# Patient Record
Sex: FEMALE | Race: WHITE | NOT HISPANIC OR LATINO | Employment: OTHER | URBAN - METROPOLITAN AREA
[De-identification: names, ages, dates, MRNs, and addresses within clinical notes are randomized per-mention and may not be internally consistent; named-entity substitution may affect disease eponyms.]

---

## 2017-01-10 ENCOUNTER — TELEPHONE (OUTPATIENT)
Dept: CARDIOLOGY | Facility: HOSPITAL | Age: 77
End: 2017-01-10

## 2017-01-10 NOTE — TELEPHONE ENCOUNTER
----- Message from Darryl Swenson MD sent at 1/10/2017  5:08 PM EST -----  Regarding: RE: 3 second pauses  No. But I told our nurses to lower her flecainide 100 mg po bid    GT  ----- Message -----     From: PATSY Hernandez     Sent: 1/9/2017   2:31 PM       To: Darryl Swenson MD  Subject: 3 second pauses                                  I saw this patient recently in the office.  She was complaining of skipped beats (please see my note).  I put a 14 day Zio monitor on her which has not yet been officially read, but does show a minimum heart rate of 37 bpm and a maximum of 156/m.  She had 3 pauses; the longest lasted 3.3 seconds.  She had first-degree AV block; 19 supraventricular tachycardia runs; atrial fibrillation occurred at 9% burden ranging from  bpm.  She also had possible junctional rhythm.    She has an appointment to see on 1/24/17.  Does that appointment need to be sooner?

## 2017-01-11 ENCOUNTER — TELEPHONE (OUTPATIENT)
Dept: CARDIOLOGY | Facility: CLINIC | Age: 77
End: 2017-01-11

## 2017-01-11 NOTE — TELEPHONE ENCOUNTER
I called and spoke with the patient. I let her know that Dr. Swenson wanted her to decrease Flecainide to 100 mg BID. She doesn't need a prescription. I told her to let us know if she starts feeling bad.

## 2017-01-13 ENCOUNTER — OFFICE VISIT (OUTPATIENT)
Dept: CARDIOLOGY | Facility: HOSPITAL | Age: 77
End: 2017-01-13

## 2017-01-13 ENCOUNTER — PROCEDURE VISIT (OUTPATIENT)
Dept: CARDIOLOGY | Facility: HOSPITAL | Age: 77
End: 2017-01-13

## 2017-01-13 VITALS
BODY MASS INDEX: 30.9 KG/M2 | OXYGEN SATURATION: 98 % | DIASTOLIC BLOOD PRESSURE: 72 MMHG | TEMPERATURE: 98.6 F | HEIGHT: 64 IN | HEART RATE: 66 BPM | WEIGHT: 181 LBS | RESPIRATION RATE: 20 BRPM | SYSTOLIC BLOOD PRESSURE: 177 MMHG

## 2017-01-13 DIAGNOSIS — I48.0 PAROXYSMAL ATRIAL FIBRILLATION (HCC): Primary | ICD-10-CM

## 2017-01-13 DIAGNOSIS — I48.0 PAROXYSMAL ATRIAL FIBRILLATION (HCC): ICD-10-CM

## 2017-01-13 PROCEDURE — 93005 ELECTROCARDIOGRAM TRACING: CPT

## 2017-01-13 PROCEDURE — 99214 OFFICE O/P EST MOD 30 MIN: CPT | Performed by: NURSE PRACTITIONER

## 2017-01-13 PROCEDURE — 93010 ELECTROCARDIOGRAM REPORT: CPT | Performed by: INTERNAL MEDICINE

## 2017-01-13 RX ORDER — FLECAINIDE ACETATE 100 MG/1
100 TABLET ORAL 2 TIMES DAILY
COMMUNITY
End: 2017-01-24 | Stop reason: SDUPTHER

## 2017-01-13 NOTE — MR AVS SNAPSHOT
Kenisha ACOSTA Peg   1/13/2017 10:30 AM   Office Visit    Dept Phone:  [phone]   Encounter #:  [id number]    Provider:  PATSY Hernandez   Department:  [facility]                Your Full Care Plan              Today's Medication Changes          These changes are accurate as of: 1/13/17  1:46 PM.  If you have any questions, ask your nurse or doctor.               Medication(s)that have changed:     apixaban 5 MG tablet tablet   Commonly known as:  ELIQUIS   Take 5 mg by mouth 2 (two) times a day.   What changed:  Another medication with the same name was removed. Continue taking this medication, and follow the directions you see here.       flecainide 100 MG tablet   Commonly known as:  TAMBOCOR   Take 100 mg by mouth 2 (Two) Times a Day.   What changed:  Another medication with the same name was removed. Continue taking this medication, and follow the directions you see here.   Changed by:  PATSY Hernandez                  Your Updated Medication List          This list is accurate as of: 1/13/17  1:46 PM.  Always use your most recent med list.                amLODIPine 2.5 MG tablet   Commonly known as:  NORVASC   Take 1 tablet by mouth Daily.       apixaban 5 MG tablet tablet   Commonly known as:  ELIQUIS       aspirin 81 MG tablet       cholecalciferol 1000 UNITS tablet   Commonly known as:  VITAMIN D3       coenzyme Q10 100 MG capsule       flecainide 100 MG tablet   Commonly known as:  TAMBOCOR       isosorbide mononitrate 30 MG 24 hr tablet   Commonly known as:  IMDUR   Take 1 tablet by mouth daily.       levothyroxine 25 MCG tablet   Commonly known as:  SYNTHROID, LEVOTHROID       meclizine 25 MG tablet   Commonly known as:  ANTIVERT       nitroglycerin 0.4 MG SL tablet   Commonly known as:  NITROSTAT   1 under the tongue as needed for angina, may repeat q5mins for up three doses       PARoxetine 10 MG tablet   Commonly known as:   PAXIL       rosuvastatin 10 MG tablet   Commonly known as:  CRESTOR       valsartan-hydrochlorothiazide 160-25 MG per tablet   Commonly known as:  DIOVAN-HCT               You Were Diagnosed With        Codes Comments    Paroxysmal atrial fibrillation    -  Primary ICD-10-CM: I48.0  ICD-9-CM: 427.31       Instructions     None    Patient Instructions History      Upcoming Appointments     Visit Type Date Time Department    FOLLOW UP 1/13/2017 10:30 AM [facility] [place]    ELECTROCARDIOGRAM 1/13/2017 11:15 AM [facility]    FOLLOW UP 1/24/2017  8:45 AM [facility]    FOLLOW UP 5/3/2017  2:00 PM [facility]      MyChart Signup     Our records indicate that you have an active [employer] account.    You can view your After Visit Summary by going to [employer] and logging in with your [employer] username and password.  If you don't have a [employer] username and password but a parent or guardian has access to your record, the parent or guardian should login with their own [employer] username and password and access your record to view the After Visit Summary.    If you have questions, you can email [email or web address] or call [phone] to talk to our [employer] staff.  Remember, [employer] is NOT to be used for urgent needs.  For medical emergencies, dial 911.               Other Info from Your Visit           Your Appointments     Jan 24, 2017  8:45 AM EST   Follow Up with Darryl Swenson MD   [facility] CARDIOLOGY (--)    [address] [address]   [phone]           Arrive 15 minutes prior to appointment.            May 03, 2017  2:00 PM EDT   Follow Up with Darryl Swenson MD   [facility] CARDIOLOGY (--)    [address] [place] [id number]   [phone]           Arrive 15 minutes prior to appointment.              Allergies     Beta Adrenergic Blockers  "Intolerance Dizziness    Sulfa Antibiotics Allergy Rash      Reason for Visit     Follow-up a fib      Vital Signs     Blood Pressure Pulse Temperature Respirations Height Weight    177/72 (BP Location: Left arm, Patient Position: Standing) 66 98.6 °F (37 °C) 20 63.5\" (161.3 cm) 181 lb (82.1 kg)    Oxygen Saturation Body Mass Index Smoking Status             98% 31.56 kg/m2 Never Smoker         Problems and Diagnoses Noted     Atrial fibrillation (irregular heartbeat)        "

## 2017-01-13 NOTE — PROGRESS NOTES
"  Encounter Date: 01/13/2017    Patient ID: Kenisha Ruvalcaba is a 76 y.o. female    Chief Complaint: Follow-up (a fib)     HPI :    This is a delightful elderly white female who is normally followed by Sony Swenson for her paroxysmal atrial fibrillation, sick sinus syndrome, and grade 2 diastolic heart failure.  Dr. Clark is her primary general cardiologist. She has a history of hypertension and hyperlipidemia. She has had vertigo for many years. She has had intermittent pain across her neck and shoulders for years as well. She feels a tightness across her chest which occurs at rest and lasts about 45 seconds. It resolved spontaneously. She did have a heart catheterization in June 2016 which showed nonobstructive coronary arteries and a normal EF.      She was seen in October 2016 in the emergency room with tightness across her chest and neck.  She was found to be in A. fib with RVR and was given diltiazem during her visit.  She converted with the medication.  At that time her flecainide was increased from 100 mg twice daily to 150 mg twice daily.  She has been maintained on apixaban twice a day throughout.  I saw her on 12/15/16 where she continued to feel skipping heartbeats on a fairly regular basis.  She was also having fairly substantial lower extremity edema.  I started her on amlodipine 2.5 mg daily, asked her to limit her sodium intake, wear compression stockings, and placed an event monitor on her.    Her preliminary cardiac monitor showed that she had heart rates as low as 37 and she had 3 pauses with the longest lasting 3.3 seconds, some runs of SVT and 9% burden of atrial fibrillation.  I relayed this information to Dr. Swenson who had his nurses call her to decrease her flecainide to 100 mg twice daily.  She comes in to see me for follow-up in this context.    She is accompanied by her daughter who relates that she has had significant episodes of \"sinking spells\" which occur suddenly at varying " times of the day regardless of activity but the patient suddenly becomes pale and shaky.  She has to sit down and rest for 5 or 6 minutes and then can resume her activities.  She has had no true syncope.  She denies chest pain, pressure, PND, orthopnea, hematuria, hematochezia, melena.  EKG in the office today shows sinus rhythm with first-degree AV block; left axis deviation; pulmonary disease pattern.  Ventricular rate is 61 bpm There are no precipitating or alleviating factors.  She relates that overall she has been more fatigued than normal.  She tells me that she felt better when taking 150 mg of flecainide twice daily.        Past Medical History   Diagnosis Date   • Anxiety    • Arrhythmia      atrial fibrillation   • Carotid artery occlusion    • Coronary artery disease    • Dyslipidemia    • H/O echocardiogram 09/10/2010     Left atrium 4.6. Moderate left atrial enlargement. Ejection fraction 55%. Severe right atrial enlargement and 2+ tricuspid regurgitation with RVSP 37mmHg.    • H/O echocardiogram 12/03/2010     Moderate mitral regurgitation, moderate tricuspid regurgitation, normal left ventricular size and function, negative bubble study.    • History of cardioversion 12/03/2010     initiation of Tambocor and subsequent external cardioversion.   • History of Doppler ultrasound 03/01/2013     carotid ultrasound, with no significant stenosis    • Hyperlipidemia    • Hypertension    • Paroxysmal atrial fibrillation    • SSS (sick sinus syndrome)    • Valvular heart disease    • Vertigo          Past Surgical History   Procedure Laterality Date   • Carotid endarterectomy Left    • Tubal abdominal ligation     • Cardiac catheterization Left 6/16/2016     Procedure: Cardiac catheterization;  Surgeon: Chuck Clark MD;  Location: [facility] INVASIVE LOCATION;  Service:        Social History     Social History   • Marital status:      Spouse name: N/A   • Number of children: N/A   • Years  of education: N/A     Occupational History   • retired      Social History Main Topics   • Smoking status: Never Smoker   • Smokeless tobacco: Never Used   • Alcohol use 0.6 oz/week     1 Glasses of wine per week      Comment: ocassionally   • Drug use: No   • Sexual activity: Defer     Other Topics Concern   • Not on file     Social History Narrative    Denies caffeine use. Lives at home with .        Family History   Problem Relation Age of Onset   • Alzheimer's disease Mother    • Cancer Mother    • Aortic aneurysm Father    • Heart valve disorder Sister        Review of Systems:  Review of Systems   Constitution: Positive for weakness and malaise/fatigue. Negative for chills, decreased appetite, diaphoresis, fever, night sweats, weight gain and weight loss.   HENT: Negative for congestion and nosebleeds.    Eyes: Positive for blurred vision. Negative for double vision.   Cardiovascular: Positive for chest pain, claudication, dyspnea on exertion, irregular heartbeat and palpitations. Negative for cyanosis, leg swelling, near-syncope, orthopnea, paroxysmal nocturnal dyspnea and syncope.        Chest pressure   Respiratory: Positive for shortness of breath and snoring. Negative for cough, hemoptysis, sleep disturbances due to breathing and wheezing.    Endocrine: Negative.    Hematologic/Lymphatic: Negative for adenopathy and bleeding problem. Does not bruise/bleed easily.   Skin: Negative.  Negative for rash.   Musculoskeletal: Positive for arthritis, muscle cramps and muscle weakness. Negative for falls and myalgias.   Gastrointestinal: Negative for bloating, anorexia, melena, nausea and vomiting.   Genitourinary: Negative for dysuria, hematuria and nocturia.   Neurological: Positive for dizziness and loss of balance. Negative for excessive daytime sleepiness, focal weakness, light-headedness and seizures.   Psychiatric/Behavioral: The patient has insomnia.        Objective:  Physical Exam    Constitutional: She is oriented to person, place, and time. She appears well-developed and well-nourished. No distress.   HENT:   Head: Normocephalic and atraumatic.   Mouth/Throat: Oropharynx is clear and moist.   Eyes: Conjunctivae are normal. Pupils are equal, round, and reactive to light. No scleral icterus.   Neck: No JVD present. No tracheal deviation present. No thyromegaly present.   Cardiovascular: Normal rate, regular rhythm, normal heart sounds and intact distal pulses.  Exam reveals no gallop and no friction rub.    No murmur heard.  Pulmonary/Chest: Effort normal and breath sounds normal. No respiratory distress. She has no wheezes. She has no rales.   Abdominal: Soft. Bowel sounds are normal. She exhibits no distension.   Musculoskeletal: She exhibits edema.   Trace ankle edema bilaterally   Neurological: She is alert and oriented to person, place, and time.   Skin: Skin is warm and dry.   Psychiatric: She has a normal mood and affect.   :    Current Outpatient Prescriptions:   •  amLODIPine (NORVASC) 2.5 MG tablet, Take 1 tablet by mouth Daily., Disp: 30 tablet, Rfl: 1  •  apixaban (ELIQUIS) 5 MG tablet tablet, Take 5 mg by mouth 2 (two) times a day., Disp: , Rfl:   •  aspirin 81 MG tablet, Take 1 tablet by mouth daily., Disp: 30 tablet, Rfl: 11  •  cholecalciferol (VITAMIN D3) 1000 UNITS tablet, Take 2,000 Units by mouth daily., Disp: , Rfl:   •  coenzyme Q10 100 MG capsule, Take 100 mg by mouth daily., Disp: , Rfl:   •  flecainide (TAMBOCOR) 150 MG tablet, Take 1 tablet by mouth 2 (Two) Times a Day., Disp: 60 tablet, Rfl: 1  •  isosorbide mononitrate (IMDUR) 30 MG 24 hr tablet, Take 1 tablet by mouth daily., Disp: 90 tablet, Rfl: 3  •  levothyroxine (SYNTHROID, LEVOTHROID) 25 MCG tablet, Take 25 mcg by mouth daily., Disp: , Rfl:   •  meclizine (ANTIVERT) 25 MG tablet, Take 25 mg by mouth 3 (three) times a day as needed for dizziness., Disp: , Rfl:   •  nitroglycerin (NITROSTAT) 0.4 MG SL tablet, 1  under the tongue as needed for angina, may repeat q5mins for up three doses, Disp: 100 tablet, Rfl: 11  •  PARoxetine (PAXIL) 10 MG tablet, Take 10 mg by mouth every morning., Disp: , Rfl:   •  rosuvastatin (CRESTOR) 10 MG tablet, Take 10 mg by mouth 1 (One) Time Per Week., Disp: , Rfl:   •  valsartan-hydrochlorothiazide (DIOVAN-HCT) 160-25 MG per tablet, Take 1 tablet by mouth daily., Disp: , Rfl:      Lab/Diagnostic Studies: EKG in the office today shows sinus rhythm with first-degree AV block; left axis deviation; pulmonary disease pattern.  Ventricular rate is 61 bpm; VT interval 250 ms; QRS duration 110 ms;  ms.  Final analysis is pending.    Assessment:    Paroxysmal atrial fibrillation:  -CHADSVASc score equals 5  -On apixaban, flecainide 100 mg twice daily  -In sinus rhythm today     History of sick sinus syndrome:   -Preliminary reading of cardiac monitoring shows 9% A. fib burden; 3 pauses with up to a 3 second pause; SVT  -Patient has pacemaker     Diastolic heart failure, grade 2   -Trace ankle edema bilaterally     Hypertension, better control     Hyperlipidemia, on statin     Nonobstructive coronary artery disease     Anxiety, on Paxil      Plan:    -Continue current medications  -Continue to keep a blood pressure log  -Keep appointment with Dr. Swenson as scheduled      Discussion:    We discussed the possibility of a pulmonary vein ablation in the future if Dr. Swenson deems that necessary.  We briefly went over what that might entail.  All questions were answered.

## 2017-01-24 ENCOUNTER — OFFICE VISIT (OUTPATIENT)
Dept: CARDIOLOGY | Facility: CLINIC | Age: 77
End: 2017-01-24

## 2017-01-24 VITALS
HEIGHT: 63 IN | SYSTOLIC BLOOD PRESSURE: 134 MMHG | HEART RATE: 77 BPM | DIASTOLIC BLOOD PRESSURE: 72 MMHG | BODY MASS INDEX: 32.04 KG/M2 | WEIGHT: 180.8 LBS

## 2017-01-24 DIAGNOSIS — I49.5 SSS (SICK SINUS SYNDROME) (HCC): ICD-10-CM

## 2017-01-24 DIAGNOSIS — I49.5 TACHYCARDIA-BRADYCARDIA SYNDROME (HCC): Primary | ICD-10-CM

## 2017-01-24 DIAGNOSIS — I10 ESSENTIAL HYPERTENSION: ICD-10-CM

## 2017-01-24 DIAGNOSIS — I48.0 PAROXYSMAL ATRIAL FIBRILLATION (HCC): ICD-10-CM

## 2017-01-24 PROCEDURE — 99214 OFFICE O/P EST MOD 30 MIN: CPT | Performed by: INTERNAL MEDICINE

## 2017-01-24 PROCEDURE — 93000 ELECTROCARDIOGRAM COMPLETE: CPT | Performed by: INTERNAL MEDICINE

## 2017-01-24 RX ORDER — FLECAINIDE ACETATE 100 MG/1
100 TABLET ORAL 2 TIMES DAILY
Qty: 180 TABLET | Refills: 3 | Status: SHIPPED | OUTPATIENT
Start: 2017-01-24 | End: 2017-02-16

## 2017-01-24 RX ORDER — AMLODIPINE BESYLATE 2.5 MG/1
2.5 TABLET ORAL DAILY
Qty: 90 TABLET | Refills: 3 | Status: SHIPPED | OUTPATIENT
Start: 2017-01-24 | End: 2017-02-16

## 2017-01-24 RX ORDER — VALSARTAN AND HYDROCHLOROTHIAZIDE 160; 25 MG/1; MG/1
1 TABLET ORAL DAILY
Qty: 90 TABLET | Refills: 3 | Status: SHIPPED | OUTPATIENT
Start: 2017-01-24 | End: 2017-02-16

## 2017-01-24 NOTE — MR AVS SNAPSHOT
[facility] CARDIOLOGY  [phone]                    Kenisha Ruvalcaba   1/24/2017 8:45 AM   Office Visit    Dept Phone:  [phone]   Encounter #:  [id number]    Provider:  Darryl Swenson MD   Department:  [facility] CARDIOLOGY                Your Full Care Plan              Your Updated Medication List          This list is accurate as of: 1/24/17  9:15 AM.  Always use your most recent med list.                amLODIPine 2.5 MG tablet   Commonly known as:  NORVASC   Take 1 tablet by mouth Daily.       apixaban 5 MG tablet tablet   Commonly known as:  ELIQUIS       aspirin 81 MG tablet       cholecalciferol 1000 UNITS tablet   Commonly known as:  VITAMIN D3       coenzyme Q10 100 MG capsule       flecainide 100 MG tablet   Commonly known as:  TAMBOCOR       isosorbide mononitrate 30 MG 24 hr tablet   Commonly known as:  IMDUR   Take 1 tablet by mouth daily.       levothyroxine 25 MCG tablet   Commonly known as:  SYNTHROID, LEVOTHROID       meclizine 25 MG tablet   Commonly known as:  ANTIVERT       nitroglycerin 0.4 MG SL tablet   Commonly known as:  NITROSTAT   1 under the tongue as needed for angina, may repeat q5mins for up three doses       PARoxetine 10 MG tablet   Commonly known as:  PAXIL       rosuvastatin 10 MG tablet   Commonly known as:  CRESTOR       valsartan-hydrochlorothiazide 160-25 MG per tablet   Commonly known as:  DIOVAN-HCT               We Performed the Following     ECG 12 Lead       You Were Diagnosed With        Codes Comments    Tachycardia-bradycardia syndrome    -  Primary ICD-10-CM: I49.5  ICD-9-CM: 427.81     Paroxysmal atrial fibrillation     ICD-10-CM: I48.0  ICD-9-CM: 427.31     SSS (sick sinus syndrome)     ICD-10-CM: I49.5  ICD-9-CM: 427.81     Essential hypertension     ICD-10-CM: I10  ICD-9-CM: 401.9       Instructions     None    Patient Instructions History      Upcoming Appointments     Visit Type Date Time  "Department    FOLLOW UP 1/24/2017  8:45 AM [facility]    FOLLOW UP 5/3/2017  2:00 PM [facility]      MyChart Signup     Our records indicate that you have an active [facility] [employer] account.    You can view your After Visit Summary by going to [employer] and logging in with your [employer] username and password.  If you don't have a [employer] username and password but a parent or guardian has access to your record, the parent or guardian should login with their own [employer] username and password and access your record to view the After Visit Summary.    If you have questions, you can email [email or web address] or call [phone] to talk to our [employer] staff.  Remember, [employer] is NOT to be used for urgent needs.  For medical emergencies, dial 911.               Other Info from Your Visit           Your Appointments     May 03, 2017  2:00 PM EDT   Follow Up with Darryl Swenson MD   [facility] [place] CARDIOLOGY (--)    [address] [place] [address] [address]  [facility] [address] [phone]           Arrive 15 minutes prior to appointment.              Allergies     Beta Adrenergic Blockers Intolerance Dizziness    Sulfa Antibiotics Allergy Rash      Reason for Visit     Atrial Fibrillation     Hypertension           Vital Signs     Blood Pressure Pulse Height Weight Last Menstrual Period Body Mass Index    134/72 (BP Location: Left arm, Patient Position: Sitting) 77 63\" (160 cm) 180 lb 12.8 oz (82 kg) (LMP Unknown) 32.03 kg/m2    Smoking Status                   Never Smoker           Problems and Diagnoses Noted     High blood pressure    Atrial fibrillation (irregular heartbeat)    SSS (sick sinus syndrome)    Tachycardia-bradycardia syndrome    -  Primary        "

## 2017-01-24 NOTE — PROGRESS NOTES
Kenisha DAVE DoshiAllamakee  1940  [phone]      01/24/2017    [facility] CARDIOLOGY     Lana Flores MD  [address]  [facility] [address]    Chief Complaint   Patient presents with   • Atrial Fibrillation   • Hypertension       Problem List:   Problem List:   1. Paroxysmal atrial fibrillation:  a. Echocardiogram, 09/10/2010: Left atrium 4.6. Moderate left atrial enlargement. Ejection fraction 55%. Severe right atrial enlargement and 2+ tricuspid regurgitation with RVSP 37 mmHg.  b. Coumadin initiation, 09/07/2010.  c. Initiation of Tambocor and subsequent external cardioversion, 12/03/2010.  d. Echocardiogram, 12/03/2010: Moderate mitral regurgitation, moderate tricuspid regurgitation, normal left ventricular size and function, negative bubble study.   e. External cardioversion to sinus rhythm, December 2010.  f. CHADS-VASc score equals 4 to 6, on Coumadin.                               h.  Echo: 10/2016 Left ventricular function is hyperdynamic (EF > 70).                          Left ventricular diastolic dysfunction (grade II) consistent with pseudonormalization. Left atrial cavity size is mildly dilated.                              Mild mitral valve regurgitation is present. Estimated right ventricular systolic pressure from tricuspid regurgitation is moderately elevated (45-55        mmHg).  2. Sick sinus syndrome:   a. Patient was hospitalized at [facility], 04/26/2016 through 04/28/2016 with a 30-day Event monitor.   b. Event recorder 12/16/16: 9% AF, 3.3 sec pauses, HR range from 37 bpm - 151 bpm  3. Mild coronary artery disease on cardiac catheterization, 02/06/2013. And C on 6/6/16, normal EF  4. Carotid artery stenosis, status post left CEA:  a. Carotid ultrasound, March 2013, with no significant stenosis.  5. Valvular heart disease:  a. Echo, 01/19/2013: Left ventricular ejection fraction 55% to 60%, mild to moderate mitral regurgitation,  RVSP 46 mmHg.   6. Sick sinus syndrome:  a. Admitted to [facility] on 04/26/2016.  7. Hypertension.   8. Dyslipidemia.   9. Vertigo.   10. Anxiety.   11. Surgical history:  a. Tubal ligation.   b. Left CEA, March 2008.      Allergies  Allergies   Allergen Reactions   • Beta Adrenergic Blockers Dizziness   • Sulfa Antibiotics Rash       Current Medications    Current Outpatient Prescriptions:   •  amLODIPine (NORVASC) 2.5 MG tablet, Take 1 tablet by mouth Daily., Disp: 30 tablet, Rfl: 1  •  apixaban (ELIQUIS) 5 MG tablet tablet, Take 5 mg by mouth 2 (two) times a day., Disp: , Rfl:   •  aspirin 81 MG tablet, Take 1 tablet by mouth daily., Disp: 30 tablet, Rfl: 11  •  cholecalciferol (VITAMIN D3) 1000 UNITS tablet, Take 2,000 Units by mouth daily., Disp: , Rfl:   •  coenzyme Q10 100 MG capsule, Take 100 mg by mouth daily., Disp: , Rfl:   •  flecainide (TAMBOCOR) 100 MG tablet, Take 100 mg by mouth 2 (Two) Times a Day., Disp: , Rfl:   •  isosorbide mononitrate (IMDUR) 30 MG 24 hr tablet, Take 1 tablet by mouth daily., Disp: 90 tablet, Rfl: 3  •  levothyroxine (SYNTHROID, LEVOTHROID) 25 MCG tablet, Take 25 mcg by mouth daily., Disp: , Rfl:   •  meclizine (ANTIVERT) 25 MG tablet, Take 25 mg by mouth 3 (three) times a day as needed for dizziness., Disp: , Rfl:   •  nitroglycerin (NITROSTAT) 0.4 MG SL tablet, 1 under the tongue as needed for angina, may repeat q5mins for up three doses, Disp: 100 tablet, Rfl: 11  •  PARoxetine (PAXIL) 10 MG tablet, Take 10 mg by mouth every morning., Disp: , Rfl:   •  rosuvastatin (CRESTOR) 10 MG tablet, Take 10 mg by mouth 1 (One) Time Per Week., Disp: , Rfl:   •  valsartan-hydrochlorothiazide (DIOVAN-HCT) 160-25 MG per tablet, Take 1 tablet by mouth daily., Disp: , Rfl:     History of Present Illness   HPI    Pt presents for follow up of AF/PVC/HTN/bradycardia. Since we last saw the pt, pt has had multiple  AF episodes mainly minutes to hours in duration. Iniatially  "flecainide  was increased but then decreased back to 100 mg po bid after ER recorded demonstrated slow HR and pauses. Pt complains of SOB mainly with exertion, No CP. She also has sinking spells with decreased responsiveness and slow heart rate in the 30's documented with pauses by ER.  Denies any hospitalizations, ER visits, bleeding, or TIA/CVA symptoms. Overall feels tired. Had HTN response with BP in the 200's recently.     ROS:  General:  + fatigue, No weight gain or loss  Cardiovascular:  Denies CP, PND, syncope, near syncope, edema or palpitations.  Pulmonary:  + GARVEY, No cough, or wheezing      Vitals:    01/24/17 0843   BP: 134/72   BP Location: Left arm   Patient Position: Sitting   Pulse: 77   Weight: 180 lb 12.8 oz (82 kg)   Height: 63\" (160 cm)     PE:  NAD  Neck: no JVD, no carotid bruits, no TM  Heart RRR, NL S1, S2, S4 present, no rubs, murmurs  Lungs: CTA  Abd: soft, non-tender, NL BS  Ext: No musculoskeletal deformities    Diagnostic Data:    ECG 12 Lead  Date/Time: 1/24/2017 9:09 AM  Performed by: FITZ ARNETT  Authorized by: FITZ ARNETT   Rhythm: sinus rhythm  BPM: 76  Conduction: 1st degree            1. Tachycardia-bradycardia syndrome    2. Paroxysmal atrial fibrillation    3. SSS (sick sinus syndrome)    4. Essential hypertension          Plan:  1) Tachycardia bradycardia syndrome: with syncope and documented bradycardia Needs DDD PM and better AA medical therapy  Continue present medications.   2) Anticoagulation  Continue NOAC  3) HTN: monitor for now  Wt loss, exercise, salt reduction    F/up in 6 months      "

## 2017-01-24 NOTE — LETTER
January 24, 2017     PATSY Hernandez  [address]  [address]  [facility] [address]    Patient: Kenisha Ruvalcaba   YOB: 1940   Date of Visit: 1/24/2017       Dear Dr. Castle, PATSY:    Thank you for referring Kenisha Ruvalcaba to me for evaluation. Below are the relevant portions of my assessment and plan of care.    If you have questions, please do not hesitate to call me. I look forward to following Kenisha along with you.         Sincerely,        Darryl Swenson MD        CC: MD Darryl Manuel MD  1/24/2017  9:04 AM  Incomplete  Kenisha Ruvalcaba  1940  [phone]      01/24/2017    [facility] CARDIOLOGY     Lana Flores MD  [address]  [place] [id number]    Chief Complaint   Patient presents with   • Atrial Fibrillation   • Hypertension       Problem List:   Problem List:   1. Paroxysmal atrial fibrillation:  a. Echocardiogram, 09/10/2010: Left atrium 4.6. Moderate left atrial enlargement. Ejection fraction 55%. Severe right atrial enlargement and 2+ tricuspid regurgitation with RVSP 37 mmHg.  b. Coumadin initiation, 09/07/2010.  c. Initiation of Tambocor and subsequent external cardioversion, 12/03/2010.  d. Echocardiogram, 12/03/2010: Moderate mitral regurgitation, moderate tricuspid regurgitation, normal left ventricular size and function, negative bubble study.   e. External cardioversion to sinus rhythm, December 2010.  f. CHADS-VASc score equals 4 to 6, on Coumadin.                               h.  Echo: 10/2016 Left ventricular function is hyperdynamic (EF > 70).                          Left ventricular diastolic dysfunction (grade II) consistent with pseudonormalization. Left atrial cavity size is mildly dilated.                              Mild mitral valve regurgitation is present. Estimated right ventricular systolic pressure from tricuspid regurgitation is moderately elevated (45-55         mmHg).  2. Sick sinus syndrome:   a. Patient was hospitalized at [facility], 04/26/2016 through 04/28/2016 with a 30-day Event monitor.   b. Event recorder 12/16/16: 9% AF, 3.3 sec pauses, HR range from 37 bpm -   3. Mild coronary artery disease on cardiac catheterization, 02/06/2013. And LHC on 6/6/16, normal EF  4. Carotid artery stenosis, status post left CEA:  a. Carotid ultrasound, March 2013, with no significant stenosis.  5. Valvular heart disease:  a. Echo, 01/19/2013: Left ventricular ejection fraction 55% to 60%, mild to moderate mitral regurgitation, RVSP 46 mmHg.   6. Sick sinus syndrome:  a. Admitted to [facility] on 04/26/2016.  7. Hypertension.   8. Dyslipidemia.   9. Vertigo.   10. Anxiety.   11. Surgical history:  a. Tubal ligation.   b. Left CEA, March 2008.      Allergies  Allergies   Allergen Reactions   • Beta Adrenergic Blockers Dizziness   • Sulfa Antibiotics Rash       Current Medications    Current Outpatient Prescriptions:   •  amLODIPine (NORVASC) 2.5 MG tablet, Take 1 tablet by mouth Daily., Disp: 30 tablet, Rfl: 1  •  apixaban (ELIQUIS) 5 MG tablet tablet, Take 5 mg by mouth 2 (two) times a day., Disp: , Rfl:   •  aspirin 81 MG tablet, Take 1 tablet by mouth daily., Disp: 30 tablet, Rfl: 11  •  cholecalciferol (VITAMIN D3) 1000 UNITS tablet, Take 2,000 Units by mouth daily., Disp: , Rfl:   •  coenzyme Q10 100 MG capsule, Take 100 mg by mouth daily., Disp: , Rfl:   •  flecainide (TAMBOCOR) 100 MG tablet, Take 100 mg by mouth 2 (Two) Times a Day., Disp: , Rfl:   •  isosorbide mononitrate (IMDUR) 30 MG 24 hr tablet, Take 1 tablet by mouth daily., Disp: 90 tablet, Rfl: 3  •  levothyroxine (SYNTHROID, LEVOTHROID) 25 MCG tablet, Take 25 mcg by mouth daily., Disp: , Rfl:   •  meclizine (ANTIVERT) 25 MG tablet, Take 25 mg by mouth 3 (three) times a day as needed for dizziness., Disp: , Rfl:   •  nitroglycerin (NITROSTAT) 0.4 MG SL tablet, 1 under the tongue as  "needed for angina, may repeat q5mins for up three doses, Disp: 100 tablet, Rfl: 11  •  PARoxetine (PAXIL) 10 MG tablet, Take 10 mg by mouth every morning., Disp: , Rfl:   •  rosuvastatin (CRESTOR) 10 MG tablet, Take 10 mg by mouth 1 (One) Time Per Week., Disp: , Rfl:   •  valsartan-hydrochlorothiazide (DIOVAN-HCT) 160-25 MG per tablet, Take 1 tablet by mouth daily., Disp: , Rfl:     History of Present Illness   HPI    Pt presents for follow up of AF/PVC/HTN/bradycardia. Since we last saw the pt, pt has had multiple  AF episodes mainly minutes to hours in duration. Iniatially flecainide  was increased but then decreased back to 100 mg po bid after ER recorded demonstrated slow HR and pauses. Pt complains of  SOB mainly with exertion, No CP. She also has sinking spLH, and dizziness. Denies any hospitalizations, ER visits, bleeding, or TIA/CVA symptoms. Overall feels well.    ROS:  General:  Denies fatigue, weight gain or loss  Cardiovascular:  Denies CP, PND, syncope, near syncope, edema or palpitations.  Pulmonary:  Denies GARVEY, cough, or wheezing      Vitals:    01/24/17 0843   BP: 134/72   BP Location: Left arm   Patient Position: Sitting   Pulse: 77   Weight: 180 lb 12.8 oz (82 kg)   Height: 63\" (160 cm)     PE:  NAD  Neck: no JVD, no carotid bruits, no TM  Heart RRR, NL S1, S2, S4 present, no rubs, murmurs  Lungs: CTA  Abd: soft, non-tender, NL BS  Ext: No musculoskeletal deformities    Diagnostic Data:  Procedures    No diagnosis found.      Plan:  1) AF  Continue present medications.   2) Anticoagulation  Continue NOAC/warfarin/ASA  3) HTN  Wt loss, exercise, salt reduction    F/up in 6 months      "

## 2017-02-02 ENCOUNTER — PREP FOR SURGERY (OUTPATIENT)
Dept: CARDIOLOGY | Facility: CLINIC | Age: 77
End: 2017-02-02

## 2017-02-02 DIAGNOSIS — I49.5 TACHY-BRADY SYNDROME (HCC): ICD-10-CM

## 2017-02-02 DIAGNOSIS — I49.5 SSS (SICK SINUS SYNDROME) (HCC): Primary | ICD-10-CM

## 2017-02-02 RX ORDER — ONDANSETRON 2 MG/ML
4 INJECTION INTRAMUSCULAR; INTRAVENOUS EVERY 6 HOURS PRN
Status: CANCELLED | OUTPATIENT
Start: 2017-02-02

## 2017-02-02 RX ORDER — ACETAMINOPHEN 325 MG/1
650 TABLET ORAL EVERY 4 HOURS PRN
Status: CANCELLED | OUTPATIENT
Start: 2017-02-02

## 2017-02-16 ENCOUNTER — APPOINTMENT (OUTPATIENT)
Dept: PREADMISSION TESTING | Facility: HOSPITAL | Age: 77
End: 2017-02-16

## 2017-02-16 DIAGNOSIS — I49.5 SSS (SICK SINUS SYNDROME) (HCC): ICD-10-CM

## 2017-02-16 DIAGNOSIS — I49.5 TACHY-BRADY SYNDROME (HCC): ICD-10-CM

## 2017-02-16 LAB
ANION GAP SERPL CALCULATED.3IONS-SCNC: 6 MMOL/L (ref 3–11)
BUN BLD-MCNC: 17 MG/DL (ref 9–23)
BUN/CREAT SERPL: 18.9 (ref 7–25)
CALCIUM SPEC-SCNC: 9.7 MG/DL (ref 8.7–10.4)
CHLORIDE SERPL-SCNC: 102 MMOL/L (ref 99–109)
CO2 SERPL-SCNC: 32 MMOL/L (ref 20–31)
CREAT BLD-MCNC: 0.9 MG/DL (ref 0.6–1.3)
DEPRECATED RDW RBC AUTO: 45.9 FL (ref 37–54)
ERYTHROCYTE [DISTWIDTH] IN BLOOD BY AUTOMATED COUNT: 13.6 % (ref 11.3–14.5)
GFR SERPL CREATININE-BSD FRML MDRD: 61 ML/MIN/1.73
GLUCOSE BLD-MCNC: 95 MG/DL (ref 70–100)
HBA1C MFR BLD: 5.7 % (ref 4.8–5.6)
HCT VFR BLD AUTO: 40 % (ref 34.5–44)
HGB BLD-MCNC: 12.6 G/DL (ref 11.5–15.5)
INR PPP: 0.99
MCH RBC QN AUTO: 29 PG (ref 27–31)
MCHC RBC AUTO-ENTMCNC: 31.5 G/DL (ref 32–36)
MCV RBC AUTO: 92 FL (ref 80–99)
PLATELET # BLD AUTO: 225 10*3/MM3 (ref 150–450)
PMV BLD AUTO: 10.1 FL (ref 6–12)
POTASSIUM BLD-SCNC: 4.1 MMOL/L (ref 3.5–5.5)
PROTHROMBIN TIME: 10.8 SECONDS (ref 9.6–11.5)
RBC # BLD AUTO: 4.35 10*6/MM3 (ref 3.89–5.14)
SODIUM BLD-SCNC: 140 MMOL/L (ref 132–146)
WBC NRBC COR # BLD: 6.16 10*3/MM3 (ref 3.5–10.8)

## 2017-02-16 PROCEDURE — 36415 COLL VENOUS BLD VENIPUNCTURE: CPT

## 2017-02-16 PROCEDURE — 85027 COMPLETE CBC AUTOMATED: CPT | Performed by: PHYSICIAN ASSISTANT

## 2017-02-16 PROCEDURE — 85610 PROTHROMBIN TIME: CPT | Performed by: PHYSICIAN ASSISTANT

## 2017-02-16 PROCEDURE — 80048 BASIC METABOLIC PNL TOTAL CA: CPT | Performed by: PHYSICIAN ASSISTANT

## 2017-02-16 PROCEDURE — 83036 HEMOGLOBIN GLYCOSYLATED A1C: CPT | Performed by: PHYSICIAN ASSISTANT

## 2017-02-16 RX ORDER — FLECAINIDE ACETATE 100 MG/1
100 TABLET ORAL 2 TIMES DAILY
COMMUNITY
End: 2017-05-03 | Stop reason: SDUPTHER

## 2017-02-16 RX ORDER — MECLIZINE HYDROCHLORIDE 25 MG/1
25 TABLET ORAL 3 TIMES DAILY PRN
COMMUNITY

## 2017-02-16 RX ORDER — ISOSORBIDE MONONITRATE 30 MG/1
30 TABLET, EXTENDED RELEASE ORAL DAILY
COMMUNITY
End: 2017-02-18 | Stop reason: HOSPADM

## 2017-02-16 RX ORDER — ASPIRIN 81 MG/1
81 TABLET ORAL NIGHTLY
COMMUNITY
End: 2022-01-01

## 2017-02-16 RX ORDER — VALSARTAN AND HYDROCHLOROTHIAZIDE 160; 25 MG/1; MG/1
1 TABLET ORAL DAILY
COMMUNITY
End: 2018-03-08 | Stop reason: SDUPTHER

## 2017-02-16 RX ORDER — ROSUVASTATIN CALCIUM 10 MG/1
20 TABLET, COATED ORAL 2 TIMES WEEKLY
Status: ON HOLD | COMMUNITY
End: 2019-04-08

## 2017-02-16 RX ORDER — PAROXETINE 10 MG/1
10 TABLET, FILM COATED ORAL NIGHTLY
Status: ON HOLD | COMMUNITY
End: 2019-04-08

## 2017-02-16 RX ORDER — MELATONIN
1000 DAILY
COMMUNITY

## 2017-02-16 RX ORDER — NITROGLYCERIN 0.4 MG/1
0.4 TABLET SUBLINGUAL
COMMUNITY
End: 2021-11-09 | Stop reason: ALTCHOICE

## 2017-02-16 RX ORDER — AMLODIPINE BESYLATE 2.5 MG/1
2.5 TABLET ORAL DAILY
COMMUNITY
End: 2017-02-18 | Stop reason: HOSPADM

## 2017-02-16 RX ORDER — LEVOTHYROXINE SODIUM 0.05 MG/1
50 TABLET ORAL EVERY MORNING
COMMUNITY

## 2017-02-16 RX ORDER — UBIDECARENONE 100 MG
100 CAPSULE ORAL DAILY
COMMUNITY
End: 2018-01-10

## 2017-02-16 NOTE — DISCHARGE INSTRUCTIONS
The following information and instructions were given:    NPO after MN except sips of water with routine prescribed medication (except blood thinner, diabetes, or weight reducing medication) unless otherwise instructed by your physician.  Do not eat, drink, smoke or chew gum after MN the night before surgery. This also includes no mints.    DO NOT shave, wear makeup or dark nail polish.    Remove all jewelry (advised to go to jeweler if unable to remove).    Leave anything you consider valuable at home.    Leave your suitcase in the car until after your surgery.    Bring the following with you (if applicable)   -picture ID and insurance cards   -Co-pay/deductible required by insurance   -Medications in the original bottles (not a list) including all over-the-counter  medications if not brought to PAT   -Copy of advance directive, living will or power of  documents if not  brought to PAT   -CPAP or BIPAP mask and tubing (do not bring machine)   -Skin prep instructions sheet   -PAT Pass   Education booklet, brochure, handout or binder given to patient.    Pain Control After Surgery handout given to patient.    Respirex use (handout given to patient) and pneumonia prevention.    Signs and Symptoms of infection.    DVT Prevention stressing the importance of ambulation.    Patient to apply Chlorhexadine wipes to surgical area (as instructed) the night before procedure and the AM of procedure. WIPES GIVEN.

## 2017-02-17 ENCOUNTER — HOSPITAL ENCOUNTER (OUTPATIENT)
Facility: HOSPITAL | Age: 77
Discharge: HOME | End: 2017-02-18
Attending: INTERNAL MEDICINE | Admitting: INTERNAL MEDICINE

## 2017-02-17 DIAGNOSIS — I49.5 TACHYCARDIA-BRADYCARDIA SYNDROME (HCC): ICD-10-CM

## 2017-02-17 DIAGNOSIS — I49.5 SSS (SICK SINUS SYNDROME) (HCC): ICD-10-CM

## 2017-02-17 PROCEDURE — C1898 LEAD, PMKR, OTHER THAN TRANS: HCPCS | Performed by: INTERNAL MEDICINE

## 2017-02-17 PROCEDURE — 25010000002 PHENYLEPHRINE PER 1 ML: Performed by: INTERNAL MEDICINE

## 2017-02-17 PROCEDURE — G0378 HOSPITAL OBSERVATION PER HR: HCPCS

## 2017-02-17 PROCEDURE — 25010000003 CEFAZOLIN IN DEXTROSE 2-4 GM/100ML-% SOLUTION: Performed by: PHYSICIAN ASSISTANT

## 2017-02-17 PROCEDURE — 33208 INSRT HEART PM ATRIAL & VENT: CPT | Performed by: INTERNAL MEDICINE

## 2017-02-17 PROCEDURE — C1785 PMKR, DUAL, RATE-RESP: HCPCS | Performed by: INTERNAL MEDICINE

## 2017-02-17 PROCEDURE — 99153 MOD SED SAME PHYS/QHP EA: CPT | Performed by: INTERNAL MEDICINE

## 2017-02-17 PROCEDURE — 25010000002 HEPARIN (PORCINE) PER 1000 UNITS: Performed by: INTERNAL MEDICINE

## 2017-02-17 PROCEDURE — 25010000002 FENTANYL CITRATE (PF) 100 MCG/2ML SOLUTION: Performed by: INTERNAL MEDICINE

## 2017-02-17 PROCEDURE — 25010000003 CEFAZOLIN IN DEXTROSE 2-4 GM/100ML-% SOLUTION: Performed by: INTERNAL MEDICINE

## 2017-02-17 PROCEDURE — C1892 INTRO/SHEATH,FIXED,PEEL-AWAY: HCPCS | Performed by: INTERNAL MEDICINE

## 2017-02-17 PROCEDURE — 99152 MOD SED SAME PHYS/QHP 5/>YRS: CPT | Performed by: INTERNAL MEDICINE

## 2017-02-17 PROCEDURE — 25010000002 METHYLPREDNISOLONE PER 125 MG: Performed by: INTERNAL MEDICINE

## 2017-02-17 PROCEDURE — 25010000002 ONDANSETRON PER 1 MG: Performed by: INTERNAL MEDICINE

## 2017-02-17 PROCEDURE — 25010000002 MIDAZOLAM PER 1 MG: Performed by: INTERNAL MEDICINE

## 2017-02-17 DEVICE — STEROX BIPOLAR IS-1 ATRIAL/VENTRICULAR
Type: IMPLANTABLE DEVICE | Status: FUNCTIONAL
Brand: FINELINE® II EZ STEROX

## 2017-02-17 DEVICE — LD PM SOLIA S 53: Type: IMPLANTABLE DEVICE | Status: FUNCTIONAL

## 2017-02-17 DEVICE — IMPLANTABLE DEVICE
Type: IMPLANTABLE DEVICE | Status: FUNCTIONAL
Brand: ELUNA 8 DR-T

## 2017-02-17 RX ORDER — MELATONIN
1000 DAILY
Status: DISCONTINUED | OUTPATIENT
Start: 2017-02-17 | End: 2017-02-18 | Stop reason: HOSPADM

## 2017-02-17 RX ORDER — ASPIRIN 81 MG/1
81 TABLET ORAL DAILY
Status: DISCONTINUED | OUTPATIENT
Start: 2017-02-17 | End: 2017-02-18 | Stop reason: HOSPADM

## 2017-02-17 RX ORDER — ONDANSETRON 2 MG/ML
INJECTION INTRAMUSCULAR; INTRAVENOUS AS NEEDED
Status: DISCONTINUED | OUTPATIENT
Start: 2017-02-17 | End: 2017-02-17 | Stop reason: HOSPADM

## 2017-02-17 RX ORDER — CEFAZOLIN SODIUM 2 G/100ML
2 INJECTION, SOLUTION INTRAVENOUS EVERY 8 HOURS
Status: COMPLETED | OUTPATIENT
Start: 2017-02-17 | End: 2017-02-18

## 2017-02-17 RX ORDER — PAROXETINE 10 MG/1
10 TABLET, FILM COATED ORAL DAILY
Status: DISCONTINUED | OUTPATIENT
Start: 2017-02-17 | End: 2017-02-18 | Stop reason: HOSPADM

## 2017-02-17 RX ORDER — ISOSORBIDE MONONITRATE 60 MG/1
30 TABLET, EXTENDED RELEASE ORAL DAILY
Status: DISCONTINUED | OUTPATIENT
Start: 2017-02-17 | End: 2017-02-17

## 2017-02-17 RX ORDER — VALSARTAN AND HYDROCHLOROTHIAZIDE 160; 12.5 MG/1; MG/1
1 TABLET, FILM COATED ORAL DAILY
Status: DISCONTINUED | OUTPATIENT
Start: 2017-02-18 | End: 2017-02-18 | Stop reason: HOSPADM

## 2017-02-17 RX ORDER — ATORVASTATIN CALCIUM 20 MG/1
20 TABLET, FILM COATED ORAL NIGHTLY
Status: DISCONTINUED | OUTPATIENT
Start: 2017-02-17 | End: 2017-02-18 | Stop reason: HOSPADM

## 2017-02-17 RX ORDER — METOPROLOL TARTRATE 50 MG/1
50 TABLET, FILM COATED ORAL EVERY 12 HOURS SCHEDULED
Qty: 60 TABLET | Refills: 6 | Status: CANCELLED | OUTPATIENT
Start: 2017-02-17

## 2017-02-17 RX ORDER — ACETAMINOPHEN 325 MG/1
650 TABLET ORAL EVERY 4 HOURS PRN
Status: DISCONTINUED | OUTPATIENT
Start: 2017-02-17 | End: 2017-02-18 | Stop reason: HOSPADM

## 2017-02-17 RX ORDER — ONDANSETRON 2 MG/ML
4 INJECTION INTRAMUSCULAR; INTRAVENOUS EVERY 6 HOURS PRN
Status: DISCONTINUED | OUTPATIENT
Start: 2017-02-17 | End: 2017-02-18 | Stop reason: HOSPADM

## 2017-02-17 RX ORDER — LIDOCAINE HYDROCHLORIDE 10 MG/ML
INJECTION, SOLUTION INFILTRATION; PERINEURAL AS NEEDED
Status: DISCONTINUED | OUTPATIENT
Start: 2017-02-17 | End: 2017-02-17 | Stop reason: HOSPADM

## 2017-02-17 RX ORDER — FLECAINIDE ACETATE 50 MG/1
100 TABLET ORAL EVERY 12 HOURS SCHEDULED
Status: DISCONTINUED | OUTPATIENT
Start: 2017-02-17 | End: 2017-02-18 | Stop reason: HOSPADM

## 2017-02-17 RX ORDER — AMLODIPINE BESYLATE 5 MG/1
5 TABLET ORAL DAILY
Status: DISCONTINUED | OUTPATIENT
Start: 2017-02-18 | End: 2017-02-17

## 2017-02-17 RX ORDER — ASPIRIN 81 MG/1
81 TABLET ORAL DAILY
Status: DISCONTINUED | OUTPATIENT
Start: 2017-02-17 | End: 2017-02-17

## 2017-02-17 RX ORDER — MIDAZOLAM HYDROCHLORIDE 1 MG/ML
INJECTION INTRAMUSCULAR; INTRAVENOUS AS NEEDED
Status: DISCONTINUED | OUTPATIENT
Start: 2017-02-17 | End: 2017-02-17 | Stop reason: HOSPADM

## 2017-02-17 RX ORDER — LEVOTHYROXINE SODIUM 0.03 MG/1
25 TABLET ORAL
Status: DISCONTINUED | OUTPATIENT
Start: 2017-02-18 | End: 2017-02-18 | Stop reason: HOSPADM

## 2017-02-17 RX ORDER — METHYLPREDNISOLONE SODIUM SUCCINATE 125 MG/2ML
INJECTION, POWDER, LYOPHILIZED, FOR SOLUTION INTRAMUSCULAR; INTRAVENOUS AS NEEDED
Status: DISCONTINUED | OUTPATIENT
Start: 2017-02-17 | End: 2017-02-17 | Stop reason: HOSPADM

## 2017-02-17 RX ORDER — BUPIVACAINE HYDROCHLORIDE 5 MG/ML
INJECTION, SOLUTION EPIDURAL; INTRACAUDAL AS NEEDED
Status: DISCONTINUED | OUTPATIENT
Start: 2017-02-17 | End: 2017-02-17 | Stop reason: HOSPADM

## 2017-02-17 RX ORDER — SODIUM CHLORIDE 9 MG/ML
INJECTION, SOLUTION INTRAVENOUS CONTINUOUS PRN
Status: DISCONTINUED | OUTPATIENT
Start: 2017-02-17 | End: 2017-02-17 | Stop reason: HOSPADM

## 2017-02-17 RX ORDER — CEFAZOLIN SODIUM 2 G/100ML
2 INJECTION, SOLUTION INTRAVENOUS ONCE
Status: COMPLETED | OUTPATIENT
Start: 2017-02-17 | End: 2017-02-17

## 2017-02-17 RX ORDER — ACETAMINOPHEN 325 MG/1
650 TABLET ORAL EVERY 4 HOURS PRN
Status: DISCONTINUED | OUTPATIENT
Start: 2017-02-17 | End: 2017-02-17 | Stop reason: HOSPADM

## 2017-02-17 RX ORDER — ONDANSETRON 2 MG/ML
4 INJECTION INTRAMUSCULAR; INTRAVENOUS EVERY 6 HOURS PRN
Status: DISCONTINUED | OUTPATIENT
Start: 2017-02-17 | End: 2017-02-17 | Stop reason: HOSPADM

## 2017-02-17 RX ORDER — FENTANYL CITRATE 50 UG/ML
INJECTION, SOLUTION INTRAMUSCULAR; INTRAVENOUS AS NEEDED
Status: DISCONTINUED | OUTPATIENT
Start: 2017-02-17 | End: 2017-02-17 | Stop reason: HOSPADM

## 2017-02-17 RX ORDER — METOPROLOL TARTRATE 50 MG/1
50 TABLET, FILM COATED ORAL EVERY 12 HOURS SCHEDULED
Status: DISCONTINUED | OUTPATIENT
Start: 2017-02-17 | End: 2017-02-18 | Stop reason: HOSPADM

## 2017-02-17 RX ORDER — SODIUM CHLORIDE 0.9 % (FLUSH) 0.9 %
1-10 SYRINGE (ML) INJECTION AS NEEDED
Status: DISCONTINUED | OUTPATIENT
Start: 2017-02-17 | End: 2017-02-18 | Stop reason: HOSPADM

## 2017-02-17 RX ADMIN — CEFAZOLIN SODIUM 2 G: 2 INJECTION, SOLUTION INTRAVENOUS at 09:43

## 2017-02-17 RX ADMIN — APIXABAN 5 MG: 5 TABLET, FILM COATED ORAL at 20:54

## 2017-02-17 RX ADMIN — VITAMIN D, TAB 1000IU (100/BT) 1000 UNITS: 25 TAB at 13:23

## 2017-02-17 RX ADMIN — METOPROLOL TARTRATE 50 MG: 50 TABLET, FILM COATED ORAL at 20:53

## 2017-02-17 RX ADMIN — ACETAMINOPHEN 650 MG: 325 TABLET, FILM COATED ORAL at 22:17

## 2017-02-17 RX ADMIN — ATORVASTATIN CALCIUM 20 MG: 20 TABLET, FILM COATED ORAL at 20:53

## 2017-02-17 RX ADMIN — CEFAZOLIN SODIUM 2 G: 2 INJECTION, SOLUTION INTRAVENOUS at 18:46

## 2017-02-17 RX ADMIN — ASPIRIN 81 MG: 81 TABLET, COATED ORAL at 13:23

## 2017-02-17 RX ADMIN — METOPROLOL TARTRATE 50 MG: 50 TABLET, FILM COATED ORAL at 13:24

## 2017-02-17 RX ADMIN — FLECAINIDE ACETATE 100 MG: 50 TABLET ORAL at 20:54

## 2017-02-17 RX ADMIN — PAROXETINE HYDROCHLORIDE 10 MG: 10 TABLET, FILM COATED ORAL at 13:23

## 2017-02-17 NOTE — PLAN OF CARE
Problem: Patient Care Overview (Adult)  Goal: Plan of Care Review  Outcome: Ongoing (interventions implemented as appropriate)    02/17/17 5577   Coping/Psychosocial Response Interventions   Plan Of Care Reviewed With patient;spouse   Patient Care Overview   Progress progress toward functional goals as expected

## 2017-02-18 ENCOUNTER — APPOINTMENT (OUTPATIENT)
Dept: GENERAL RADIOLOGY | Facility: HOSPITAL | Age: 77
End: 2017-02-18

## 2017-02-18 VITALS
RESPIRATION RATE: 16 BRPM | HEART RATE: 76 BPM | OXYGEN SATURATION: 89 % | SYSTOLIC BLOOD PRESSURE: 139 MMHG | HEIGHT: 63 IN | WEIGHT: 185 LBS | BODY MASS INDEX: 32.78 KG/M2 | TEMPERATURE: 97.6 F | DIASTOLIC BLOOD PRESSURE: 49 MMHG

## 2017-02-18 PROCEDURE — 71020 HC CHEST PA AND LATERAL: CPT

## 2017-02-18 PROCEDURE — G0378 HOSPITAL OBSERVATION PER HR: HCPCS

## 2017-02-18 PROCEDURE — 99024 POSTOP FOLLOW-UP VISIT: CPT | Performed by: INTERNAL MEDICINE

## 2017-02-18 PROCEDURE — 25010000003 CEFAZOLIN IN DEXTROSE 2-4 GM/100ML-% SOLUTION: Performed by: INTERNAL MEDICINE

## 2017-02-18 RX ORDER — METOPROLOL TARTRATE 50 MG/1
50 TABLET, FILM COATED ORAL EVERY 12 HOURS SCHEDULED
Qty: 60 TABLET | Refills: 6 | Status: SHIPPED | OUTPATIENT
Start: 2017-02-18 | End: 2017-05-03 | Stop reason: SDUPTHER

## 2017-02-18 RX ADMIN — VITAMIN D, TAB 1000IU (100/BT) 1000 UNITS: 25 TAB at 08:54

## 2017-02-18 RX ADMIN — VALSARTAN AND HYDROCHLOROTHIAZIDE 1 TABLET: 160; 12.5 TABLET, FILM COATED ORAL at 08:54

## 2017-02-18 RX ADMIN — CEFAZOLIN SODIUM 2 G: 2 INJECTION, SOLUTION INTRAVENOUS at 03:58

## 2017-02-18 RX ADMIN — ASPIRIN 81 MG: 81 TABLET, COATED ORAL at 08:58

## 2017-02-18 RX ADMIN — FLECAINIDE ACETATE 100 MG: 50 TABLET ORAL at 08:54

## 2017-02-18 RX ADMIN — PAROXETINE HYDROCHLORIDE 10 MG: 10 TABLET, FILM COATED ORAL at 08:54

## 2017-02-18 RX ADMIN — LEVOTHYROXINE SODIUM 25 MCG: 25 TABLET ORAL at 06:25

## 2017-02-18 RX ADMIN — METOPROLOL TARTRATE 50 MG: 50 TABLET, FILM COATED ORAL at 08:54

## 2017-02-18 RX ADMIN — APIXABAN 5 MG: 5 TABLET, FILM COATED ORAL at 08:54

## 2017-02-18 NOTE — DISCHARGE SUMMARY
"[facility]  IP Progress Note      Chief Complaint: Atrial Fibrillation    Subjective:Denies Chest Pain    Objective:  Blood pressure 152/77, pulse 90, temperature 97.5 °F (36.4 °C), temperature source Oral, resp. rate 18, height 63\" (160 cm), weight 185 lb (83.9 kg), SpO2 (!) 89 %, not currently breastfeeding.   No intake or output data in the 24 hours ending 02/18/17 0843    Physical Exam:  General: No acute distress.   Neck: no JVD.  Chest:No respiratory distress, breath sounds are normal. No wheezes,  rhonchi or rales.  Cardiovascular: Normal S1 and S2, no murmer, gallop or rub.    Extremities: No edema. Pacemaker site stable, no hematoma    Results Review:     I reviewed the patient's new clinical results.      Results from last 7 days  Lab Units 02/16/17  1009   WBC 10*3/mm3 6.16   HEMOGLOBIN g/dL 12.6   HEMATOCRIT % 40.0   PLATELETS 10*3/mm3 225       Results from last 7 days  Lab Units 02/16/17  1009   SODIUM mmol/L 140   POTASSIUM mmol/L 4.1   CHLORIDE mmol/L 102   TOTAL CO2 mmol/L 32.0*   BUN mg/dL 17   CREATININE mg/dL 0.90   CALCIUM mg/dL 9.7   GLUCOSE mg/dL 95       Results from last 7 days  Lab Units 02/16/17  1009   SODIUM mmol/L 140   POTASSIUM mmol/L 4.1   CHLORIDE mmol/L 102   TOTAL CO2 mmol/L 32.0*   BUN mg/dL 17   CREATININE mg/dL 0.90   GLUCOSE mg/dL 95   CALCIUM mg/dL 9.7       Results from last 7 days  Lab Units 02/16/17  1009   INR  0.99     No results found for: CKTOTAL, CKMB, CKMBINDEX, TROPONINI, TROPONINT                Tele: Sinus Rhythm    FINAL IMPRESSIONS:   1. Successful implantation of a dual-chamber permanent pacemaker.   2. Difficult positioning of the right atrial lead due to probable extensive right atrial scar ultimately accepting both a low atrial sensing threshold as well as a elevated right atrial pacing threshold.   RECOMMENDATION(S):  1. The patient will be monitored on telemetry.  2. If stable, the patient will be discharged to home in " "the morning.   Darryl Swenson MD  02/17/17  11:43 AM    Assessment:  Hospital Problem List     Paroxysmal atrial fibrillation    Overview Signed 6/16/2016  8:32 AM by PATSY Champion. Echocardiogram, 09/10/2010: LA 4.6. Moderate LAE. EF 55%. Severe SILVIO and 2+ TR with RVSP 37 mmHg.  b. Coumadin initiation, 09/07/2010.  c. Initiation of Tambocor and subsequent external cardioversion, 12/03/2010.  d. Echocardiogram, 12/03/2010: Moderate MR, moderate TR, normal LV size and function, negative bubble study.    e. External cardioversion to sinus rhythm, December 2010.  f. CHADS-VASc score = 4 to 6, on Coumadin.          Tachycardia-bradycardia syndrome    Overview Signed 2/18/2017  8:43 AM by JOSI Duran     Dual chamber pacemaker placement 2/17/17 Dr. Swenson                 Plan:  Discharge home  Disposition stable  Follow up with Dr. Swenson one week would check and 3 months for device check.      Pascual Hollis PA-C        CC: Tach-rosalba syndrome, s/p PPM yesterday, no new complaints    Visit Vitals   • /77 (BP Location: Left arm, Patient Position: Lying)   • Pulse 90   • Temp 97.5 °F (36.4 °C) (Oral)   • Resp 18   • Ht 63\" (160 cm)   • Wt 185 lb (83.9 kg)   • LMP  (LMP Unknown)   • SpO2 (!) 89%   • BMI 32.77 kg/m2   .    Physical Exam:    Lungs: CTA, no wheezing, equal air entry bilaterally, resonant to percussion  Chest: no swelling, bandage in place on right chest  Cor: RRR, physiologic S1, S2, no rubs, gallops, murmurs or thrills  Ext: warm, negative edema    Assessment:    Hospital Problem List     Paroxysmal atrial fibrillation    Overview Signed 6/16/2016  8:32 AM by PATSY Champion. Echocardiogram, 09/10/2010: LA 4.6. Moderate LAE. EF 55%. Severe SILVIO and 2+ TR with RVSP 37 mmHg.  h. Coumadin initiation, 09/07/2010.  i. Initiation of Tambocor and subsequent external cardioversion, 12/03/2010.  j. Echocardiogram, 12/03/2010: Moderate MR, moderate TR, normal LV " size and function, negative bubble study.    k. External cardioversion to sinus rhythm, December 2010.  l. CHADS-VASc score = 4 to 6, on Coumadin.          Tachycardia-bradycardia syndrome    Overview Signed 2/18/2017  8:43 AM by JOSI Duran     Dual chamber pacemaker placement 2/17/17 Dr. Swenson              CXR: no PTX, good lead positions      Plan: d/c home, follow-up arranged, patient is stable.

## 2017-02-18 NOTE — PLAN OF CARE
Problem: Patient Care Overview (Adult)  Goal: Plan of Care Review  Outcome: Ongoing (interventions implemented as appropriate)    02/18/17 0247   Coping/Psychosocial Response Interventions   Plan Of Care Reviewed With patient   Patient Care Overview   Progress progress toward functional goals as expected         Problem: Cardiac Rhythm Management Device (Adult)  Goal: Signs and Symptoms of Listed Potential Problems Will be Absent or Manageable (Cardiac Rhythm Management Device)  Outcome: Ongoing (interventions implemented as appropriate)    02/18/17 0247   Cardiac Rhythm Management Device   Problems Assessed (Cardiac Rhythm Management Device) all   Problems Present (Cardiac Rhythm Management Device) none

## 2017-02-24 ENCOUNTER — OFFICE VISIT (OUTPATIENT)
Dept: CARDIOLOGY | Facility: CLINIC | Age: 77
End: 2017-02-24

## 2017-02-24 DIAGNOSIS — Z95.0 CARDIAC PACEMAKER IN SITU: Primary | ICD-10-CM

## 2017-02-24 PROCEDURE — 99024 POSTOP FOLLOW-UP VISIT: CPT | Performed by: INTERNAL MEDICINE

## 2017-02-24 NOTE — PROGRESS NOTES
WOUND CHECK    2017    Kenisha Ruvalcaba, : 1940    WOUND CHECK            Patient has fever: [] Temperature if indicated:     Wound Location: right infraclavicular    Dressing Removed [x]        Old Dressing Appearance:  Clean, dry []                 Old, bloody drainage [x]                            Moist, serous drainage []                Moist, thick yellow/green drainage []       Wound Appearance: Redness []                  Drainage []                  Culture obtained []        Color: N/A     Consistency:      Amount: none         Gloves used, wound cleansed with sterile 4x4 and peroxide [x]       MD notified [] MD orders:     Antibiotic started []  If checked, type   Other:     Appointment for follow-up scheduled for 3 months post procedure []    Future Appointments  Date Time Provider Department Center   5/3/2017 2:00 PM Darryl Swenson MD [facility] None           Sonia Bro RN, 17      MD Signature:______________________________ Completed By/Date:

## 2017-03-20 ENCOUNTER — TELEPHONE (OUTPATIENT)
Dept: CARDIOLOGY | Facility: CLINIC | Age: 77
End: 2017-03-20

## 2017-03-20 NOTE — TELEPHONE ENCOUNTER
Called pt to see if she had plugged her home monitoring box in and she has.  Her home monitoring box is not reading.  Looked back at a print out from implant and home monitoring was not turned on.  Pt is going to bring her home monitoring box in tomorrow and I will interrogate her device and test the home monitoring box.

## 2017-03-21 ENCOUNTER — CLINICAL SUPPORT NO REQUIREMENTS (OUTPATIENT)
Dept: CARDIOLOGY | Facility: CLINIC | Age: 77
End: 2017-03-21

## 2017-03-21 DIAGNOSIS — Z95.0 PACEMAKER: Primary | ICD-10-CM

## 2017-03-21 PROCEDURE — 93288 INTERROG EVL PM/LDLS PM IP: CPT | Performed by: INTERNAL MEDICINE

## 2017-03-21 NOTE — PROGRESS NOTES
Pt came in today due to home monitoring not turned on in device.  Turned on home monitoring and sent a test reading from her home monitor and it was successful.

## 2017-05-03 ENCOUNTER — OFFICE VISIT (OUTPATIENT)
Dept: CARDIOLOGY | Facility: CLINIC | Age: 77
End: 2017-05-03

## 2017-05-03 VITALS
HEART RATE: 70 BPM | DIASTOLIC BLOOD PRESSURE: 82 MMHG | HEIGHT: 63 IN | WEIGHT: 182.4 LBS | SYSTOLIC BLOOD PRESSURE: 160 MMHG | BODY MASS INDEX: 32.32 KG/M2

## 2017-05-03 DIAGNOSIS — I10 ESSENTIAL HYPERTENSION: ICD-10-CM

## 2017-05-03 DIAGNOSIS — R00.1 BRADYCARDIA: ICD-10-CM

## 2017-05-03 DIAGNOSIS — I48.0 PAROXYSMAL ATRIAL FIBRILLATION (HCC): Primary | ICD-10-CM

## 2017-05-03 PROCEDURE — 93288 INTERROG EVL PM/LDLS PM IP: CPT | Performed by: INTERNAL MEDICINE

## 2017-05-03 PROCEDURE — 99024 POSTOP FOLLOW-UP VISIT: CPT | Performed by: INTERNAL MEDICINE

## 2017-05-03 RX ORDER — FLECAINIDE ACETATE 100 MG/1
100 TABLET ORAL 2 TIMES DAILY
Qty: 180 TABLET | Refills: 2 | Status: SHIPPED | OUTPATIENT
Start: 2017-05-03 | End: 2018-09-23

## 2017-05-03 RX ORDER — METOPROLOL TARTRATE 50 MG/1
50 TABLET, FILM COATED ORAL EVERY 12 HOURS SCHEDULED
Qty: 180 TABLET | Refills: 3 | Status: SHIPPED | OUTPATIENT
Start: 2017-05-03 | End: 2018-07-29 | Stop reason: SDUPTHER

## 2017-05-25 ENCOUNTER — TRANSCRIBE ORDERS (OUTPATIENT)
Dept: ADMINISTRATIVE | Facility: HOSPITAL | Age: 77
End: 2017-05-25

## 2017-05-25 ENCOUNTER — HOSPITAL ENCOUNTER (OUTPATIENT)
Dept: GENERAL RADIOLOGY | Facility: HOSPITAL | Age: 77
Discharge: HOME | End: 2017-05-25
Attending: ANESTHESIOLOGY | Admitting: ANESTHESIOLOGY

## 2017-05-25 DIAGNOSIS — M77.9 ENTHESOPATHY: Primary | ICD-10-CM

## 2017-05-25 PROCEDURE — 73502 X-RAY EXAM HIP UNI 2-3 VIEWS: CPT

## 2017-06-28 RX ORDER — FLECAINIDE ACETATE 100 MG/1
TABLET ORAL
Qty: 60 TABLET | Refills: 2 | Status: SHIPPED | OUTPATIENT
Start: 2017-06-28 | End: 2018-01-10

## 2017-07-19 ENCOUNTER — CLINICAL SUPPORT NO REQUIREMENTS (OUTPATIENT)
Dept: CARDIOLOGY | Facility: CLINIC | Age: 77
End: 2017-07-19

## 2017-07-19 DIAGNOSIS — I49.5 TACHYCARDIA-BRADYCARDIA SYNDROME (HCC): ICD-10-CM

## 2017-07-19 DIAGNOSIS — I48.0 PAROXYSMAL ATRIAL FIBRILLATION (HCC): Primary | ICD-10-CM

## 2017-07-19 PROCEDURE — 93296 REM INTERROG EVL PM/IDS: CPT | Performed by: INTERNAL MEDICINE

## 2017-07-19 PROCEDURE — 93294 REM INTERROG EVL PM/LDLS PM: CPT | Performed by: INTERNAL MEDICINE

## 2017-09-15 ENCOUNTER — TELEPHONE (OUTPATIENT)
Dept: CARDIOLOGY | Facility: CLINIC | Age: 77
End: 2017-09-15

## 2017-09-15 NOTE — TELEPHONE ENCOUNTER
Pt called and left msg on machine that she is having pain @ the PPM site.  Returned call but no answer.  LMOM.

## 2017-11-14 ENCOUNTER — CLINICAL SUPPORT NO REQUIREMENTS (OUTPATIENT)
Dept: CARDIOLOGY | Facility: CLINIC | Age: 77
End: 2017-11-14

## 2017-11-14 DIAGNOSIS — I49.5 SSS (SICK SINUS SYNDROME) (HCC): ICD-10-CM

## 2017-11-14 DIAGNOSIS — I48.0 PAROXYSMAL ATRIAL FIBRILLATION (HCC): ICD-10-CM

## 2017-11-14 PROCEDURE — 93294 REM INTERROG EVL PM/LDLS PM: CPT | Performed by: INTERNAL MEDICINE

## 2017-11-14 PROCEDURE — 93296 REM INTERROG EVL PM/IDS: CPT | Performed by: INTERNAL MEDICINE

## 2018-01-10 ENCOUNTER — OFFICE VISIT (OUTPATIENT)
Dept: CARDIOLOGY | Facility: CLINIC | Age: 78
End: 2018-01-10

## 2018-01-10 VITALS
HEART RATE: 86 BPM | BODY MASS INDEX: 32.96 KG/M2 | SYSTOLIC BLOOD PRESSURE: 120 MMHG | DIASTOLIC BLOOD PRESSURE: 68 MMHG | WEIGHT: 186 LBS | HEIGHT: 63 IN

## 2018-01-10 DIAGNOSIS — I10 ESSENTIAL HYPERTENSION: ICD-10-CM

## 2018-01-10 DIAGNOSIS — I48.0 PAROXYSMAL ATRIAL FIBRILLATION (HCC): Primary | ICD-10-CM

## 2018-01-10 DIAGNOSIS — I49.5 SSS (SICK SINUS SYNDROME) (HCC): ICD-10-CM

## 2018-01-10 PROCEDURE — 99213 OFFICE O/P EST LOW 20 MIN: CPT | Performed by: INTERNAL MEDICINE

## 2018-01-10 PROCEDURE — 93280 PM DEVICE PROGR EVAL DUAL: CPT | Performed by: INTERNAL MEDICINE

## 2018-01-10 NOTE — PROGRESS NOTES
Kenisha Ruvalcaba  1940  [phone]      01/10/2018    [facility] CARDIOLOGY     Lana Flores MD  [address] [facility] [address] [place] [id number]    Chief Complaint   Patient presents with   • Atrial Fibrillation         Problem List:   1. Paroxysmal atrial fibrillation:  a. Echocardiogram, 09/10/2010: Left atrium 4.6. Moderate left atrial enlargement. Ejection fraction 55%. Severe right atrial enlargement and 2+ tricuspid regurgitation with RVSP 37 mmHg.  b. Coumadin initiation, 09/07/2010.  c. Initiation of Tambocor and subsequent external cardioversion, 12/03/2010.  d. Echocardiogram, 12/03/2010: Moderate mitral regurgitation, moderate tricuspid regurgitation, normal left ventricular size and function, negative bubble study.   e. External cardioversion to sinus rhythm, December 2010.  f. CHADS-VASc score equals 4 to 6, on Coumadin.                                                                                                                                     h. Echo: 10/2016 Left ventricular function is hyperdynamic (EF > 70).                                                                                                                                                 Left ventricular diastolic dysfunction (grade II) consistent with                                                                                                                                       pseudonormalization. Left atrial cavity size is mildly dilated. Mild mitral                                                                                                                                         valve regurgitation is present. Estimated right ventricular systolic                                                                                                                                                pressure from tricuspid regurgitation is moderately elevated (45-55                                                                                                                                       mmHg).  2. Sick sinus syndrome:   a. Patient was hospitalized at [facility], 04/26/2016 through 04/28/2016 with a 30-day Event monitor.   b. Event recorder 12/16/16: 9% AF, 3.3 sec pauses, HR range from 37 bpm - 151 bpm  c. PM implant 2/17/17- BTK   3. Mild coronary artery disease on cardiac catheterization, 02/06/2013. And LHC on 6/6/16, normal EF  4. Carotid artery stenosis, status post left CEA:  a. Carotid ultrasound, March 2013, with no significant stenosis.  5. Valvular heart disease:  a. Echo, 01/19/2013: Left ventricular ejection fraction 55% to 60%, mild to moderate mitral regurgitation, RVSP 46 mmHg.   6. Sick sinus syndrome:  a. Admitted to [facility] on 04/26/2016.  7. Hypertension.   8. Dyslipidemia.   9. Vertigo.   10. Anxiety.   11. Surgical history:  a. Tubal ligation.   b. Left CEA, March 2008.    Allergies  Allergies   Allergen Reactions   • Beta Adrenergic Blockers Dizziness   • Sulfa Antibiotics Rash       Current Medications    Current Outpatient Prescriptions:   •  apixaban (ELIQUIS) 5 MG tablet tablet, Take 1 tablet by mouth 2 (Two) Times a Day., Disp: 60 tablet, Rfl: 11  •  aspirin 81 MG EC tablet, Take 81 mg by mouth Daily., Disp: , Rfl:   •  cholecalciferol (VITAMIN D3) 1000 UNITS tablet, Take 1,000 Units by mouth Daily., Disp: , Rfl:   •  flecainide (TAMBOCOR) 100 MG tablet, Take 1 tablet by mouth 2 (Two) Times a Day., Disp: 180 tablet, Rfl: 2  •  levothyroxine (SYNTHROID, LEVOTHROID) 25 MCG tablet, Take 25 mcg by mouth Daily., Disp: , Rfl:   •  meclizine (ANTIVERT) 25 MG tablet, Take 25 mg by mouth 3 (Three) Times a Day As Needed for dizziness., Disp: , Rfl:   •  metoprolol tartrate (LOPRESSOR) 50 MG tablet, Take 1 tablet by mouth Every 12 (Twelve) Hours., Disp: 180 tablet, Rfl: 3  •  nitroglycerin (NITROSTAT) 0.4 MG SL tablet, Place 0.4 mg under the  "tongue Every 5 (Five) Minutes As Needed for chest pain. Take no more than 3 doses in 15 minutes., Disp: , Rfl:   •  PARoxetine (PAXIL) 10 MG tablet, Take 20 mg by mouth Every Morning., Disp: , Rfl:   •  rosuvastatin (CRESTOR) 10 MG tablet, Take 20 mg by mouth Daily. Take only on Wednesday, Disp: , Rfl:   •  valsartan-hydrochlorothiazide (DIOVAN-HCT) 160-25 MG per tablet, Take 1 tablet by mouth Daily., Disp: , Rfl:     History of Present Illness   HPI    Pt presents for follow up of atrial fibrillation, bradycardia, and PM check.  Since we last saw the pt, she states that for the past few weeks, she has been having good days and bad days. On bad days, she is \"overcome with tiredness\" and feels achy in her neck and shoulders. She also feels palpitations at times. She denies CP, LH, and dizziness/syncope. Denies any hospitalizations, ER visits, bleeding, or TIA/CVA symptoms. BP has been good. Thyroid has not been checked recently. Steroid injections in left hip over last three months    ROS:  General:  + fatigue, - weight gain or loss  Cardiovascular:  Denies CP, PND, syncope, near syncope, edema or palpitations.  Pulmonary:  Denies GARVEY, cough, or wheezing      Vitals:    01/10/18 1415   BP: 120/68   BP Location: Left arm   Patient Position: Sitting   Pulse: 86   Weight: 84.4 kg (186 lb)   Height: 160 cm (63\")     PE:  General: NAD  Neck: no JVD, no carotid bruits, no TM  Heart RRR, NL S1, S2, S4 present, no rubs, murmurs  Lungs: CTA, no wheezes, rhonchi, or rales  Abd: soft, non-tender, NL BS  Ext: No musculoskeletal deformities, no edema, cyanosis, or clubbing  Psych: normal mood and affect    Diagnostic Data:    PM check: normal function. 91% a paced, 11% V paced. 7.7 years on battery. Episodes of atrial fibrillation.     Procedures    1. Paroxysmal atrial fibrillation    2. SSS (sick sinus syndrome)    3. Essential hypertension          Plan:  1) PAF- increased episodes recently, prob due to hip steroid injections " on Flecainide. Monitor for now  Continue present medications.   2) Anticoagulation  Continue Eliquis  3) SSS - PM WNL   4) HTN- controlled.   Wt loss, exercise, salt reduction    F/up in 8 months    Scribed for Darryl Swenson MD by Carie Marie PA-C. 1/10/2018  2:50 PM     I, Darryl Swenson MD, personally performed the services described in this documentation as scribed by the above named individual in my presence, and it is both accurate and complete.  1/10/2018  2:58 PM

## 2018-02-05 ENCOUNTER — TELEPHONE (OUTPATIENT)
Dept: CARDIOLOGY | Facility: CLINIC | Age: 78
End: 2018-02-05

## 2018-02-05 NOTE — TELEPHONE ENCOUNTER
Patient is calling to request a tier reduction on Eliquis 5 mg BID. I called [employer] and they are going to fax over the form. She has also changed her pharmacy to [facility] pharmacy on [address].

## 2018-02-08 ENCOUNTER — TELEPHONE (OUTPATIENT)
Dept: CARDIOLOGY | Facility: CLINIC | Age: 78
End: 2018-02-08

## 2018-02-08 NOTE — TELEPHONE ENCOUNTER
I tried to call the patient to let her know that I sent in the request for a tier reduction on her Eliquis and it was denied. No answer. I left a message.

## 2018-03-08 RX ORDER — VALSARTAN AND HYDROCHLOROTHIAZIDE 160; 25 MG/1; MG/1
1 TABLET ORAL DAILY
Qty: 90 TABLET | Refills: 3 | Status: SHIPPED | OUTPATIENT
Start: 2018-03-08 | End: 2018-09-23

## 2018-05-22 ENCOUNTER — CLINICAL SUPPORT NO REQUIREMENTS (OUTPATIENT)
Dept: CARDIOLOGY | Facility: CLINIC | Age: 78
End: 2018-05-22

## 2018-05-22 DIAGNOSIS — I48.0 PAROXYSMAL ATRIAL FIBRILLATION (HCC): ICD-10-CM

## 2018-05-22 DIAGNOSIS — I49.5 TACHYCARDIA-BRADYCARDIA SYNDROME (HCC): ICD-10-CM

## 2018-05-22 DIAGNOSIS — I49.5 SSS (SICK SINUS SYNDROME) (HCC): ICD-10-CM

## 2018-05-22 PROCEDURE — 93294 REM INTERROG EVL PM/LDLS PM: CPT | Performed by: INTERNAL MEDICINE

## 2018-05-22 PROCEDURE — 93296 REM INTERROG EVL PM/IDS: CPT | Performed by: INTERNAL MEDICINE

## 2018-06-05 RX ORDER — APIXABAN 5 MG/1
TABLET, FILM COATED ORAL
Qty: 60 TABLET | Refills: 6 | Status: SHIPPED | OUTPATIENT
Start: 2018-06-05 | End: 2018-09-23

## 2018-06-19 ENCOUNTER — TELEPHONE (OUTPATIENT)
Dept: CARDIOLOGY | Facility: CLINIC | Age: 78
End: 2018-06-19

## 2018-06-19 NOTE — TELEPHONE ENCOUNTER
I spoke with the patient. She states that she is feeling better today. She said that she felt horrible yesterday. She is going to call back tomorrow to see if she has had any episodes noted on her [employer] monitor.

## 2018-07-30 RX ORDER — METOPROLOL TARTRATE 50 MG/1
TABLET, FILM COATED ORAL
Qty: 60 TABLET | Refills: 5 | Status: SHIPPED | OUTPATIENT
Start: 2018-07-30 | End: 2018-09-23

## 2018-08-21 ENCOUNTER — TRANSCRIBE ORDERS (OUTPATIENT)
Dept: ADMINISTRATIVE | Facility: HOSPITAL | Age: 78
End: 2018-08-21

## 2018-08-21 DIAGNOSIS — Z12.31 VISIT FOR SCREENING MAMMOGRAM: Primary | ICD-10-CM

## 2018-08-24 ENCOUNTER — APPOINTMENT (OUTPATIENT)
Dept: MAMMOGRAPHY | Facility: HOSPITAL | Age: 78
End: 2018-08-24

## 2018-09-12 ENCOUNTER — OFFICE VISIT (OUTPATIENT)
Dept: CARDIOLOGY | Facility: CLINIC | Age: 78
End: 2018-09-12

## 2018-09-12 VITALS
WEIGHT: 187 LBS | HEART RATE: 71 BPM | BODY MASS INDEX: 33.13 KG/M2 | HEIGHT: 63 IN | SYSTOLIC BLOOD PRESSURE: 130 MMHG | OXYGEN SATURATION: 97 % | DIASTOLIC BLOOD PRESSURE: 78 MMHG

## 2018-09-12 DIAGNOSIS — I48.0 PAROXYSMAL ATRIAL FIBRILLATION (HCC): Primary | ICD-10-CM

## 2018-09-12 DIAGNOSIS — I10 ESSENTIAL HYPERTENSION: ICD-10-CM

## 2018-09-12 DIAGNOSIS — I49.5 SSS (SICK SINUS SYNDROME) (HCC): ICD-10-CM

## 2018-09-12 PROCEDURE — 99213 OFFICE O/P EST LOW 20 MIN: CPT | Performed by: NURSE PRACTITIONER

## 2018-09-12 PROCEDURE — 93280 PM DEVICE PROGR EVAL DUAL: CPT | Performed by: NURSE PRACTITIONER

## 2018-09-12 RX ORDER — CLOPIDOGREL BISULFATE 75 MG/1
75 TABLET ORAL DAILY
COMMUNITY
End: 2018-09-23

## 2018-09-12 NOTE — PROGRESS NOTES
Kenisha Ruvalcaba  1940    There is no work phone number on file.      09/12/2018    [facility] CARDIOLOGY     Lana Flores MD  [address] [address]    Chief Complaint   Patient presents with   • Atrial Fibrillation       Problem List:   1. Paroxysmal atrial fibrillation:  a. Echocardiogram, 09/10/2010: Left atrium 4.6. Moderate left atrial enlargement. Ejection fraction 55%. Severe right atrial enlargement and 2+ tricuspid regurgitation with RVSP 37 mmHg.  b. Coumadin initiation, 09/07/2010.  c. Initiation of Tambocor and subsequent external cardioversion, 12/03/2010.  d. Echocardiogram, 12/03/2010: Moderate mitral regurgitation, moderate tricuspid regurgitation, normal left ventricular size and function, negative bubble study.   e. External cardioversion to sinus rhythm, December 2010.  f. CHADS-VASc score equals 4 to 6, on Coumadin.                                                                                                                                     h. Echo: 10/2016 Left ventricular function is hyperdynamic (EF > 70).                                                                                                                                                 Left ventricular diastolic dysfunction (grade II) consistent with                                                                                                                                       pseudonormalization. Left atrial cavity size is mildly dilated. Mild mitral                                                                                                                                         valve regurgitation is present. Estimated right ventricular systolic                                                                                                                                                pressure from tricuspid regurgitation is moderately elevated  (45-55                                                                                                                                      mmHg).  2. Sick sinus syndrome:   a. [facility] through 04/28/2016 with a 30-day Event monitor.   b. Event recorder 12/16/16: 9% AF, 3.3 sec pauses, HR range from 37 bpm - 151 bpm  c. PM implant 2/17/17- BTK   3. Mild coronary artery disease on cardiac catheterization, 02/06/2013. And LHC on 6/6/16, normal EF  4. Carotid artery stenosis, status post left CEA:  a. Carotid ultrasound, March 2013, with no significant stenosis.  5. Valvular heart disease:  a. Echo, 01/19/2013: Left ventricular ejection fraction 55% to 60%, mild to moderate mitral regurgitation, RVSP 46 mmHg.   6. Sick sinus syndrome:  a. Admitted to [facility] on 04/26/2016.  7. Hypertension.   8. Dyslipidemia.   9. Vertigo.   10. Anxiety.   11. Surgical history:  a. Tubal ligation.   b. Left CEA, March 2008.    Allergies  Allergies   Allergen Reactions   • Beta Adrenergic Blockers Dizziness   • Sulfa Antibiotics Rash       Current Medications    Current Outpatient Prescriptions:   •  aspirin 81 MG EC tablet, Take 81 mg by mouth Daily., Disp: , Rfl:   •  cholecalciferol (VITAMIN D3) 1000 UNITS tablet, Take 1,000 Units by mouth Daily., Disp: , Rfl:   •  clopidogrel (PLAVIX) 75 MG tablet, Take 75 mg by mouth Daily., Disp: , Rfl:   •  ELIQUIS 5 MG tablet tablet, TAKE 1 TABLET BY MOUTH 2 TIMES A DAY, Disp: 60 tablet, Rfl: 6  •  flecainide (TAMBOCOR) 100 MG tablet, Take 1 tablet by mouth 2 (Two) Times a Day., Disp: 180 tablet, Rfl: 2  •  levothyroxine (SYNTHROID, LEVOTHROID) 25 MCG tablet, Take 25 mcg by mouth Daily., Disp: , Rfl:   •  meclizine (ANTIVERT) 25 MG tablet, Take 25 mg by mouth 3 (Three) Times a Day As Needed for dizziness., Disp: , Rfl:   •  metoprolol tartrate (LOPRESSOR) 50 MG tablet, TAKE 1 TABLET BY MOUTH EVERY 12 HOURS, Disp: 60 tablet,  "Rfl: 5  •  nitroglycerin (NITROSTAT) 0.4 MG SL tablet, Place 0.4 mg under the tongue Every 5 (Five) Minutes As Needed for chest pain. Take no more than 3 doses in 15 minutes., Disp: , Rfl:   •  PARoxetine (PAXIL) 10 MG tablet, Take 20 mg by mouth Every Morning., Disp: , Rfl:   •  rosuvastatin (CRESTOR) 10 MG tablet, Take 20 mg by mouth 2 (Two) Times a Week., Disp: , Rfl:   •  valsartan-hydrochlorothiazide (DIOVAN-HCT) 160-25 MG per tablet, Take 1 tablet by mouth Daily., Disp: 90 tablet, Rfl: 3    History of Present Illness   HPI    Pt presents for follow up of atrial fibrillation, bradycardia, PM check.  She has had more atrial fibrillation since we saw her last.  Episodes usually lasting a few hours at a time.  Can occur a few times a day.  She is severely symptomatic with c/o fatigue and weakness.  No specific triggers that she is aware of.  Is no longer getting steroid injections for her hip but is now getting accupuncture.  Since we last saw the pt, pt denies any SOB, CP, LH, and dizziness. Denies any hospitalizations, ER visits, bleeding on Eliquis, or TIA/CVA symptoms. Overall feels well when she is in normal rhythm.  BP's running ok at home.      ROS:  General:  + fatigue, no weight gain or loss  Cardiovascular:  Denies CP, PND, syncope, near syncope, edema or palpitations.  Pulmonary:  Denies GARVEY, cough, or wheezing      Vitals:    09/12/18 1059   BP: 130/78   BP Location: Left arm   Patient Position: Sitting   Pulse: 71   SpO2: 97%   Weight: 84.8 kg (187 lb)   Height: 160 cm (63\")     PE:  General: NAD  Neck: no JVD, no carotid bruits, no TM  Heart Irregularly irregular, NL S1, S2, no rubs, murmurs  Lungs: CTA, no wheezes, rhonchi, or rales  Abd: soft, non-tender, NL BS  Ext: No musculoskeletal deformities, no edema, cyanosis, or clubbing  Psych: normal mood and affect    Diagnostic Data:  Device interrogation: Device with normal function, 24% RA paced, episodes of atrial fibrillation, device undersensing, " ATR sensitivity changed to 0.1mV, mode switch rate to 130 bpm    Procedures    1. Paroxysmal atrial fibrillation (CMS/HCC)    2. SSS (sick sinus syndrome) (CMS/HCC)    3. Essential hypertension          Plan:  1) Paroxysmal atrial fibrillation:  - More frequent episodes of afib despite Flecainide.  Discussed options with patient including continuing with Flecainide or switching over to Tikosyn.  She wishes to proceed with Tikosyn.  Discussed need for hospitalization.  Will need to hold Flecainide 5 days prior.   2) Anticoagulation:  - Continue Eliquis  3) SSS:  - S/p PPM, device with normal function  4) HTN:  - Well controlled  - Wt loss, exercise, salt reduction    F/up after Tikosyn hospitalization    PAT[facility]  9/12/2018  11:58 AM

## 2018-09-13 ENCOUNTER — PREP FOR SURGERY (OUTPATIENT)
Dept: OTHER | Facility: HOSPITAL | Age: 78
End: 2018-09-13

## 2018-09-13 DIAGNOSIS — I48.0 PAROXYSMAL ATRIAL FIBRILLATION (HCC): Primary | ICD-10-CM

## 2018-09-23 ENCOUNTER — APPOINTMENT (OUTPATIENT)
Dept: PREADMISSION TESTING | Facility: HOSPITAL | Age: 78
End: 2018-09-23

## 2018-09-23 LAB
ANION GAP SERPL CALCULATED.3IONS-SCNC: 5 MMOL/L (ref 3–11)
APTT PPP: 33.6 SECONDS (ref 24–31)
BUN BLD-MCNC: 17 MG/DL (ref 9–23)
BUN/CREAT SERPL: 17.9 (ref 7–25)
CA-I SERPL ISE-MCNC: 1.28 MMOL/L (ref 1.12–1.32)
CALCIUM SPEC-SCNC: 9.5 MG/DL (ref 8.7–10.4)
CHLORIDE SERPL-SCNC: 106 MMOL/L (ref 99–109)
CO2 SERPL-SCNC: 30 MMOL/L (ref 20–31)
CREAT BLD-MCNC: 0.95 MG/DL (ref 0.6–1.3)
GFR SERPL CREATININE-BSD FRML MDRD: 57 ML/MIN/1.73
GLUCOSE BLD-MCNC: 83 MG/DL (ref 70–100)
INR PPP: 1.03 (ref 0.91–1.09)
MAGNESIUM SERPL-MCNC: 1.6 MG/DL (ref 1.3–2.7)
POTASSIUM BLD-SCNC: 3.8 MMOL/L (ref 3.5–5.5)
PROTHROMBIN TIME: 10.8 SECONDS (ref 9.6–11.5)
SODIUM BLD-SCNC: 141 MMOL/L (ref 132–146)

## 2018-09-23 PROCEDURE — 80048 BASIC METABOLIC PNL TOTAL CA: CPT | Performed by: NURSE PRACTITIONER

## 2018-09-23 PROCEDURE — 82330 ASSAY OF CALCIUM: CPT | Performed by: NURSE PRACTITIONER

## 2018-09-23 PROCEDURE — 93010 ELECTROCARDIOGRAM REPORT: CPT | Performed by: INTERNAL MEDICINE

## 2018-09-23 PROCEDURE — 85610 PROTHROMBIN TIME: CPT | Performed by: NURSE PRACTITIONER

## 2018-09-23 PROCEDURE — 85730 THROMBOPLASTIN TIME PARTIAL: CPT | Performed by: NURSE PRACTITIONER

## 2018-09-23 PROCEDURE — 83735 ASSAY OF MAGNESIUM: CPT | Performed by: NURSE PRACTITIONER

## 2018-09-23 PROCEDURE — 93005 ELECTROCARDIOGRAM TRACING: CPT | Performed by: NURSE PRACTITIONER

## 2018-09-23 RX ORDER — TRAMADOL HYDROCHLORIDE 50 MG/1
50 TABLET ORAL EVERY 6 HOURS PRN
COMMUNITY
End: 2018-09-26 | Stop reason: HOSPADM

## 2018-09-23 RX ORDER — METOPROLOL TARTRATE 50 MG/1
50 TABLET, FILM COATED ORAL 2 TIMES DAILY
Status: ON HOLD | COMMUNITY
End: 2019-01-07

## 2018-09-23 RX ORDER — TRAZODONE HYDROCHLORIDE 50 MG/1
50 TABLET ORAL NIGHTLY
COMMUNITY
End: 2018-09-26 | Stop reason: HOSPADM

## 2018-09-23 RX ORDER — LISINOPRIL 20 MG/1
20 TABLET ORAL DAILY
COMMUNITY
End: 2018-11-29 | Stop reason: CLARIF

## 2018-09-24 ENCOUNTER — HOSPITAL ENCOUNTER (INPATIENT)
Facility: HOSPITAL | Age: 78
LOS: 2 days | Discharge: HOME | End: 2018-09-26
Attending: INTERNAL MEDICINE | Admitting: INTERNAL MEDICINE

## 2018-09-24 DIAGNOSIS — I48.0 PAROXYSMAL ATRIAL FIBRILLATION (HCC): ICD-10-CM

## 2018-09-24 LAB — MAGNESIUM SERPL-MCNC: 1.7 MG/DL (ref 1.3–2.7)

## 2018-09-24 PROCEDURE — 93010 ELECTROCARDIOGRAM REPORT: CPT | Performed by: INTERNAL MEDICINE

## 2018-09-24 PROCEDURE — 93005 ELECTROCARDIOGRAM TRACING: CPT | Performed by: INTERNAL MEDICINE

## 2018-09-24 PROCEDURE — 83735 ASSAY OF MAGNESIUM: CPT

## 2018-09-24 PROCEDURE — 93005 ELECTROCARDIOGRAM TRACING: CPT | Performed by: NURSE PRACTITIONER

## 2018-09-24 RX ORDER — MECLIZINE HYDROCHLORIDE 25 MG/1
25 TABLET ORAL 3 TIMES DAILY PRN
Status: DISCONTINUED | OUTPATIENT
Start: 2018-09-24 | End: 2018-09-26 | Stop reason: HOSPADM

## 2018-09-24 RX ORDER — LEVOTHYROXINE SODIUM 0.03 MG/1
25 TABLET ORAL EVERY MORNING
Status: DISCONTINUED | OUTPATIENT
Start: 2018-09-24 | End: 2018-09-26 | Stop reason: HOSPADM

## 2018-09-24 RX ORDER — MAGNESIUM SULFATE HEPTAHYDRATE 40 MG/ML
2 INJECTION, SOLUTION INTRAVENOUS AS NEEDED
Status: DISCONTINUED | OUTPATIENT
Start: 2018-09-24 | End: 2018-09-26 | Stop reason: HOSPADM

## 2018-09-24 RX ORDER — LOSARTAN POTASSIUM 100 MG/1
100 TABLET ORAL DAILY
Status: ON HOLD | COMMUNITY
End: 2019-01-07

## 2018-09-24 RX ORDER — PAROXETINE HYDROCHLORIDE 20 MG/1
10 TABLET, FILM COATED ORAL NIGHTLY
Status: DISCONTINUED | OUTPATIENT
Start: 2018-09-24 | End: 2018-09-26 | Stop reason: HOSPADM

## 2018-09-24 RX ORDER — DOFETILIDE 0.25 MG/1
250 CAPSULE ORAL ONCE
Status: COMPLETED | OUTPATIENT
Start: 2018-09-24 | End: 2018-09-24

## 2018-09-24 RX ORDER — TRAMADOL HYDROCHLORIDE 50 MG/1
50 TABLET ORAL EVERY 6 HOURS PRN
Status: DISCONTINUED | OUTPATIENT
Start: 2018-09-24 | End: 2018-09-26 | Stop reason: HOSPADM

## 2018-09-24 RX ORDER — MELATONIN
1000 DAILY
Status: DISCONTINUED | OUTPATIENT
Start: 2018-09-24 | End: 2018-09-26 | Stop reason: HOSPADM

## 2018-09-24 RX ORDER — POTASSIUM CHLORIDE 1.5 G/1.77G
40 POWDER, FOR SOLUTION ORAL AS NEEDED
Status: DISCONTINUED | OUTPATIENT
Start: 2018-09-24 | End: 2018-09-26 | Stop reason: HOSPADM

## 2018-09-24 RX ORDER — POTASSIUM CHLORIDE 750 MG/1
40 CAPSULE, EXTENDED RELEASE ORAL AS NEEDED
Status: DISCONTINUED | OUTPATIENT
Start: 2018-09-24 | End: 2018-09-26 | Stop reason: HOSPADM

## 2018-09-24 RX ORDER — TRAZODONE HYDROCHLORIDE 50 MG/1
50 TABLET ORAL NIGHTLY PRN
Status: DISCONTINUED | OUTPATIENT
Start: 2018-09-24 | End: 2018-09-26 | Stop reason: HOSPADM

## 2018-09-24 RX ORDER — METOPROLOL TARTRATE 50 MG/1
50 TABLET, FILM COATED ORAL EVERY 12 HOURS SCHEDULED
Status: DISCONTINUED | OUTPATIENT
Start: 2018-09-24 | End: 2018-09-26 | Stop reason: HOSPADM

## 2018-09-24 RX ORDER — DOFETILIDE 0.25 MG/1
250 CAPSULE ORAL EVERY 12 HOURS SCHEDULED
Status: DISCONTINUED | OUTPATIENT
Start: 2018-09-25 | End: 2018-09-26 | Stop reason: HOSPADM

## 2018-09-24 RX ORDER — LOSARTAN POTASSIUM 50 MG/1
100 TABLET ORAL DAILY
Status: DISCONTINUED | OUTPATIENT
Start: 2018-09-24 | End: 2018-09-26 | Stop reason: HOSPADM

## 2018-09-24 RX ORDER — ASPIRIN 81 MG/1
81 TABLET ORAL NIGHTLY
Status: DISCONTINUED | OUTPATIENT
Start: 2018-09-24 | End: 2018-09-26 | Stop reason: HOSPADM

## 2018-09-24 RX ORDER — UBIDECARENONE 100 MG
100 CAPSULE ORAL 2 TIMES WEEKLY
COMMUNITY
End: 2022-01-01

## 2018-09-24 RX ORDER — MAGNESIUM SULFATE HEPTAHYDRATE 40 MG/ML
4 INJECTION, SOLUTION INTRAVENOUS AS NEEDED
Status: DISCONTINUED | OUTPATIENT
Start: 2018-09-24 | End: 2018-09-26 | Stop reason: HOSPADM

## 2018-09-24 RX ADMIN — MAGNESIUM SULFATE HEPTAHYDRATE 4 G: 40 INJECTION, SOLUTION INTRAVENOUS at 10:25

## 2018-09-24 RX ADMIN — DOFETILIDE 250 MCG: 0.25 CAPSULE ORAL at 23:12

## 2018-09-24 RX ADMIN — PAROXETINE HYDROCHLORIDE 10 MG: 20 TABLET, FILM COATED ORAL at 21:01

## 2018-09-24 RX ADMIN — APIXABAN 5 MG: 5 TABLET, FILM COATED ORAL at 21:00

## 2018-09-24 RX ADMIN — METOPROLOL TARTRATE 50 MG: 50 TABLET ORAL at 21:01

## 2018-09-24 RX ADMIN — DOFETILIDE 250 MCG: 0.25 CAPSULE ORAL at 12:33

## 2018-09-24 RX ADMIN — ASPIRIN 81 MG: 81 TABLET, COATED ORAL at 21:00

## 2018-09-24 RX ADMIN — VITAMIN D, TAB 1000IU (100/BT) 1000 UNITS: 25 TAB at 21:00

## 2018-09-24 RX ADMIN — TRAMADOL HYDROCHLORIDE 50 MG: 50 TABLET, FILM COATED ORAL at 21:04

## 2018-09-25 LAB — MAGNESIUM SERPL-MCNC: 2.1 MG/DL (ref 1.3–2.7)

## 2018-09-25 PROCEDURE — 93010 ELECTROCARDIOGRAM REPORT: CPT | Performed by: INTERNAL MEDICINE

## 2018-09-25 PROCEDURE — 99232 SBSQ HOSP IP/OBS MODERATE 35: CPT | Performed by: INTERNAL MEDICINE

## 2018-09-25 PROCEDURE — 83735 ASSAY OF MAGNESIUM: CPT

## 2018-09-25 PROCEDURE — 93005 ELECTROCARDIOGRAM TRACING: CPT | Performed by: INTERNAL MEDICINE

## 2018-09-25 PROCEDURE — 93005 ELECTROCARDIOGRAM TRACING: CPT | Performed by: NURSE PRACTITIONER

## 2018-09-25 RX ORDER — LOPERAMIDE HYDROCHLORIDE 2 MG/1
2 CAPSULE ORAL 4 TIMES DAILY PRN
Status: DISCONTINUED | OUTPATIENT
Start: 2018-09-25 | End: 2018-09-26 | Stop reason: HOSPADM

## 2018-09-25 RX ORDER — ACETAMINOPHEN 325 MG/1
650 TABLET ORAL EVERY 6 HOURS PRN
Status: DISCONTINUED | OUTPATIENT
Start: 2018-09-25 | End: 2018-09-26 | Stop reason: HOSPADM

## 2018-09-25 RX ADMIN — LOPERAMIDE HYDROCHLORIDE 2 MG: 2 CAPSULE ORAL at 21:02

## 2018-09-25 RX ADMIN — PAROXETINE HYDROCHLORIDE 10 MG: 20 TABLET, FILM COATED ORAL at 21:01

## 2018-09-25 RX ADMIN — LEVOTHYROXINE SODIUM 25 MCG: 25 TABLET ORAL at 09:51

## 2018-09-25 RX ADMIN — APIXABAN 5 MG: 5 TABLET, FILM COATED ORAL at 09:51

## 2018-09-25 RX ADMIN — METOPROLOL TARTRATE 50 MG: 50 TABLET ORAL at 21:02

## 2018-09-25 RX ADMIN — ACETAMINOPHEN 650 MG: 325 TABLET ORAL at 21:01

## 2018-09-25 RX ADMIN — LOSARTAN POTASSIUM 100 MG: 50 TABLET ORAL at 09:51

## 2018-09-25 RX ADMIN — VITAMIN D, TAB 1000IU (100/BT) 1000 UNITS: 25 TAB at 21:02

## 2018-09-25 RX ADMIN — DOFETILIDE 250 MCG: 0.25 CAPSULE ORAL at 09:51

## 2018-09-25 RX ADMIN — METOPROLOL TARTRATE 50 MG: 50 TABLET ORAL at 09:52

## 2018-09-25 RX ADMIN — DOFETILIDE 250 MCG: 0.25 CAPSULE ORAL at 21:01

## 2018-09-25 RX ADMIN — ACETAMINOPHEN 650 MG: 325 TABLET ORAL at 00:41

## 2018-09-25 RX ADMIN — APIXABAN 5 MG: 5 TABLET, FILM COATED ORAL at 21:02

## 2018-09-25 RX ADMIN — ASPIRIN 81 MG: 81 TABLET, COATED ORAL at 21:02

## 2018-09-26 VITALS
WEIGHT: 186.95 LBS | TEMPERATURE: 98.3 F | HEIGHT: 63 IN | BODY MASS INDEX: 33.12 KG/M2 | RESPIRATION RATE: 16 BRPM | DIASTOLIC BLOOD PRESSURE: 59 MMHG | OXYGEN SATURATION: 93 % | SYSTOLIC BLOOD PRESSURE: 132 MMHG | HEART RATE: 75 BPM

## 2018-09-26 LAB — MAGNESIUM SERPL-MCNC: 1.8 MG/DL (ref 1.3–2.7)

## 2018-09-26 PROCEDURE — 93005 ELECTROCARDIOGRAM TRACING: CPT | Performed by: PHYSICIAN ASSISTANT

## 2018-09-26 PROCEDURE — 93010 ELECTROCARDIOGRAM REPORT: CPT | Performed by: INTERNAL MEDICINE

## 2018-09-26 PROCEDURE — 99238 HOSP IP/OBS DSCHRG MGMT 30/<: CPT | Performed by: INTERNAL MEDICINE

## 2018-09-26 PROCEDURE — 83735 ASSAY OF MAGNESIUM: CPT

## 2018-09-26 PROCEDURE — 93005 ELECTROCARDIOGRAM TRACING: CPT | Performed by: INTERNAL MEDICINE

## 2018-09-26 RX ORDER — DOFETILIDE 0.25 MG/1
250 CAPSULE ORAL EVERY 12 HOURS SCHEDULED
Qty: 60 CAPSULE | Refills: 6 | Status: SHIPPED | OUTPATIENT
Start: 2018-09-26 | End: 2019-04-09 | Stop reason: HOSPADM

## 2018-09-26 RX ADMIN — LEVOTHYROXINE SODIUM 25 MCG: 25 TABLET ORAL at 06:37

## 2018-09-26 RX ADMIN — MAGNESIUM SULFATE HEPTAHYDRATE 4 G: 40 INJECTION, SOLUTION INTRAVENOUS at 10:27

## 2018-09-26 RX ADMIN — DOFETILIDE 250 MCG: 0.25 CAPSULE ORAL at 10:26

## 2018-09-26 RX ADMIN — LOSARTAN POTASSIUM 100 MG: 50 TABLET ORAL at 10:25

## 2018-09-26 RX ADMIN — METOPROLOL TARTRATE 50 MG: 50 TABLET ORAL at 10:26

## 2018-09-26 RX ADMIN — APIXABAN 5 MG: 5 TABLET, FILM COATED ORAL at 10:26

## 2018-09-27 ENCOUNTER — CLINICAL SUPPORT (OUTPATIENT)
Dept: CARDIOLOGY | Facility: CLINIC | Age: 78
End: 2018-09-27

## 2018-09-27 DIAGNOSIS — I48.0 PAROXYSMAL ATRIAL FIBRILLATION (HCC): Primary | ICD-10-CM

## 2018-09-27 PROCEDURE — 93000 ELECTROCARDIOGRAM COMPLETE: CPT | Performed by: INTERNAL MEDICINE

## 2018-10-01 NOTE — DISCHARGE SUMMARY
[facility] Cardiology Services  DISCHARGE SUMMARY    Date of Discharge:  9/26/18  Discharge Diagnosis: Paroxysmal atrial fibrillation    Presenting Problem/History of Present Illness  Atrial fibrillation (CMS/[facility]) [I48.91]  Paroxysmal atrial fibrillation (CMS/[facility]) [I48.0]     Problem List:  1. Paroxysmal atrial fibrillation:  a. Echocardiogram, 09/10/2010: Left atrium 4.6. Moderate left atrial enlargement. Ejection fraction 55%. Severe right atrial enlargement and 2+ tricuspid regurgitation with RVSP 37 mmHg.  b. Coumadin initiation, 09/07/2010.  c. Initiation of Tambocor and subsequent external cardioversion, 12/03/2010.  d. Echocardiogram, 12/03/2010: Moderate mitral regurgitation, moderate tricuspid regurgitation, normal left ventricular size and function, negative bubble study.   e. External cardioversion to sinus rhythm, December 2010.  f. CHADS-VASc score equals 4 to 6, on Coumadin.   g. Echocardiogram 10/2016: EF >70%, grade II diastolic dysfunction, LA cavity mildly dilated, mild MR, RVSP 45-55 mmHg  h. Recurrent atrial fibrillation despite Flecainide, flecainide discontinued 09/2018  i. Tikosyn initiation 09/2018    2. Sick sinus syndrome:   a. Patient was hospitalized at [facility], 04/26/2016 through 04/28/2016 with a 30-day Event monitor.   b. Event recorder 12/16/16: 9% AF, 3.3 sec pauses, HR range from 37 bpm - 151 bpm  c. PM implant 2/17/17- BTK   3. Mild coronary artery disease on cardiac catheterization, 02/06/2013. And [facility] on 6/6/16, normal EF  4. Carotid artery stenosis, status post left CEA:  a. Carotid ultrasound, March 2013, with no significant stenosis.  5. Valvular heart disease:  a. Echo, 01/19/2013: Left ventricular ejection fraction 55% to 60%, mild to moderate mitral regurgitation, RVSP 46 mmHg.   6. Sick sinus syndrome:  a. Admitted to [facility] on 04/26/2016.  7. Hypertension.   8. Dyslipidemia.   9. Vertigo.   10. Anxiety.   11. Surgical  history:  a. Tubal ligation.   b. Left CEA, March 2008.    Pt presents for follow up of atrial fibrillation, bradycardia, PM check.  She has had more atrial fibrillation since we saw her last.  Episodes usually lasting a few hours at a time.  Can occur a few times a day.  She is severely symptomatic with c/o fatigue and weakness.  No specific triggers that she is aware of.  Is no longer getting steroid injections for her hip but is now getting accupuncture.  Since we last saw the pt, pt denies any SOB, CP, LH, and dizziness. Denies any hospitalizations, ER visits, bleeding on Eliquis, or TIA/CVA symptoms. Overall feels well when she is in normal rhythm.  BP's running ok at home.    Hospital Course  The patient was admitted to the telemetry floor on 9/24/18 for Tikosyn initiation. On admission, the patient's labs were reviewed, pharmacy was consulted and the patient's dose was calculated. Tikosyn was initiated at 250 mcg BID and the QTc interval was followed per protocol.  After her first dose, the patient converted to NSR.   During admission, the patient and family received education about Tikosyn and potential medication interactions and QT prolonging meds.  Her Trazodone was discontinued due to possible interaction with Tikosyn.  With regards to anticoagulation, pt was continued on Eliquis throughout their admission. At the time of discharge, the patient is stable and appropriate for discharge to home with plan for repeat EKG 24 hours after discharge and follow up with Dr. Swenson in 6-8 weeks.       Condition on Discharge:  Stable    Physical Exam at Discharge  Vital Signs:  Temp: 36.8  Pulse: 75  Resp: 16  BP: 132/59  SpO2: 93%     Physical Exam:  General Appearance:    Alert, cooperative, in no acute distress   Lungs:     CTA bilaterally, Resp effort NL    Heart:    RRR Nl S1 S2 S4, PMI NL No change in murmur   Chest Wall:    No abnormalities observed   Abdomen:     Normal bowel sounds, no masses, soft,  non-tender, non-distended, benign   Extremities:   Moves all extremities well, no edema, no cyanosis, no    redness   Pulses:   Pulses palpable and equal bilaterally   Skin:   No bleeding, bruising or rash         Discharge Disposition: Home    Discharge Diet: Cardiac heart healthy diet    Activity at Discharge: As tolerated    Follow-up Appointments  Future Appointments  Date Time Provider Department Center   11/21/2018 11:20 AM Elmer Hollis PA [facility] None     Additional Instructions for the Follow-ups that You Need to Schedule     Discharge Follow-up with Specialty: Dr. Swenson or Carie Marie PA-C/Blanca Lane APRN; 2 Months    As directed      Specialty:  Dr. Swenson or Carie Marie PA-C/Blanca Lane APRN    Follow Up:  2 Months    Follow Up Details:  with PM check (BTK)               Discharge Medications     Discharge Medications      New Medications      Instructions Start Date   dofetilide 250 MCG capsule  Commonly known as:  TIKOSYN   250 mcg, Oral, Every 12 Hours Scheduled         Continue These Medications      Instructions Start Date   apixaban 5 MG tablet tablet  Commonly known as:  ELIQUIS   5 mg, Oral, 2 Times Daily      aspirin 81 MG EC tablet   81 mg, Oral, Nightly      cholecalciferol 1000 units tablet  Commonly known as:  VITAMIN D3   1,000 Units, Oral, Daily      coenzyme Q10 100 MG capsule   100 mg, Oral, Daily      levothyroxine 25 MCG tablet  Commonly known as:  SYNTHROID, LEVOTHROID   25 mcg, Oral, Every Morning      lisinopril 20 MG tablet  Commonly known as:  PRINIVIL,ZESTRIL   20 mg, Oral, Daily      losartan 100 MG tablet  Commonly known as:  COZAAR   100 mg, Oral, Daily      meclizine 25 MG tablet  Commonly known as:  ANTIVERT   25 mg, Oral, 3 Times Daily PRN      metoprolol tartrate 50 MG tablet  Commonly known as:  LOPRESSOR   50 mg, Oral, 2 Times Daily      nitroglycerin 0.4 MG SL tablet  Commonly known as:  NITROSTAT   0.4 mg, Sublingual, Every 5 Minutes  PRN, Take no more than 3 doses in 15 minutes.      PARoxetine 10 MG tablet  Commonly known as:  PAXIL   10 mg, Oral, Nightly      rosuvastatin 10 MG tablet  Commonly known as:  CRESTOR   20 mg, Oral, Nightly, TAKES ON WEDNESDAYS AND SATURDAYS ONLY         Stop These Medications    traMADol 50 MG tablet  Commonly known as:  ULTRAM     traZODone 50 MG tablet  Commonly known as:  BRICE Lane, PATSY  10/01/18  1:55 PM

## 2018-10-12 ENCOUNTER — TELEPHONE (OUTPATIENT)
Dept: CARDIOLOGY | Facility: CLINIC | Age: 78
End: 2018-10-12

## 2018-10-16 ENCOUNTER — TELEPHONE (OUTPATIENT)
Dept: CARDIOLOGY | Facility: CLINIC | Age: 78
End: 2018-10-16

## 2018-10-16 NOTE — TELEPHONE ENCOUNTER
Called to check in with pt due to afib on remote monitoring.  Pt was started on Tikosyn last month.  Pt says she has been feeling better since starting Tikosyn.  Will continue to monitor and told pt to call if she needs anything.  Pt verbalized understanding.

## 2018-10-24 ENCOUNTER — TELEPHONE (OUTPATIENT)
Dept: CARDIOLOGY | Facility: CLINIC | Age: 78
End: 2018-10-24

## 2018-11-26 NOTE — PROGRESS NOTES
Kenisha Ruvalcaba  1940    There is no work phone number on file.      11/29/2018    [facility] CARDIOLOGY     Lana Flores MD  [address] [address]    Chief Complaint   Patient presents with   • Atrial Fibrillation       Problem List:   1. Paroxysmal atrial fibrillation:  a. Echocardiogram, 09/10/2010: Left atrium 4.6. Moderate left atrial enlargement. Ejection fraction 55%. Severe right atrial enlargement and 2+ tricuspid regurgitation with RVSP 37 mmHg.  b. Coumadin initiation, 09/07/2010.  c. Initiation of Tambocor and subsequent external cardioversion, 12/03/2010.  d. Echocardiogram, 12/03/2010: Moderate mitral regurgitation, moderate tricuspid regurgitation, normal left ventricular size and function, negative bubble study.   e. External cardioversion to sinus rhythm, December 2010.  f. CHADS-VASc score equals 4 to 6, on Coumadin.   g. Echocardiogram 10/2016: EF >70%, grade II diastolic dysfunction, LA cavity mildly dilated, mild MR, RVSP 45-55 mmHg  h. Recurrent atrial fibrillation despite Flecainide, flecainide discontinued 09/2018  i. Tikosyn initiation 09/2018     2. Sick sinus syndrome:   a. Patient was hospitalized at [facility], 04/26/2016 through 04/28/2016 with a 30-day Event monitor.   b. Event recorder 12/16/16: 9% AF, 3.3 sec pauses, HR range from 37 bpm - 151 bpm  c. PM implant 2/17/17- BTK   3. Mild coronary artery disease on cardiac catheterization, 02/06/2013. And [facility] on 6/6/16, normal EF  4. Carotid artery stenosis, status post left CEA:  a. Carotid ultrasound, March 2013, with no significant stenosis.  5. Valvular heart disease:  a. Echo, 01/19/2013: Left ventricular ejection fraction 55% to 60%, mild to moderate mitral regurgitation, RVSP 46 mmHg.   6. Hypertension.   7. Dyslipidemia.   8. Vertigo.   9. Anxiety.   10. Surgical history:  a. Tubal ligation.   b. Left CEA, March 2008.        Allergies  Allergies    Allergen Reactions   • Beta Adrenergic Blockers Dizziness     DIZZINESS    • Sulfa Antibiotics Rash     RASH        Current Medications    Current Outpatient Medications:   •  apixaban (ELIQUIS) 5 MG tablet tablet, Take 5 mg by mouth 2 (Two) Times a Day., Disp: , Rfl:   •  aspirin 81 MG EC tablet, Take 81 mg by mouth Every Night., Disp: , Rfl:   •  cholecalciferol (VITAMIN D3) 1000 UNITS tablet, Take 1,000 Units by mouth Daily., Disp: , Rfl:   •  coenzyme Q10 100 MG capsule, Take 100 mg by mouth Daily., Disp: , Rfl:   •  dofetilide (TIKOSYN) 250 MCG capsule, Take 1 capsule by mouth Every 12 (Twelve) Hours., Disp: 60 capsule, Rfl: 6  •  levothyroxine (SYNTHROID, LEVOTHROID) 25 MCG tablet, Take 25 mcg by mouth Every Morning., Disp: , Rfl:   •  losartan (COZAAR) 100 MG tablet, Take 100 mg by mouth Daily., Disp: , Rfl:   •  meclizine (ANTIVERT) 25 MG tablet, Take 25 mg by mouth 3 (Three) Times a Day As Needed for dizziness., Disp: , Rfl:   •  metoprolol tartrate (LOPRESSOR) 50 MG tablet, Take 50 mg by mouth 2 (Two) Times a Day., Disp: , Rfl:   •  nitroglycerin (NITROSTAT) 0.4 MG SL tablet, Place 0.4 mg under the tongue Every 5 (Five) Minutes As Needed for chest pain. Take no more than 3 doses in 15 minutes., Disp: , Rfl:   •  PARoxetine (PAXIL) 10 MG tablet, Take 20 mg by mouth Every Night., Disp: , Rfl:   •  rosuvastatin (CRESTOR) 10 MG tablet, Take 20 mg by mouth Every Night. TAKES ON WEDNESDAYS AND SATURDAYS ONLY, Disp: , Rfl:     History of Present Illness   HPI    Pt presents for follow up of atrial fibrillation. Since we last saw the pt, we admitted her for Tikosyn initiation in September.  She notes occasional episodes of atrial fibrillation but does feel they have improved in terms of frequency since starting the Tikosyn.  Usually if she has an episode of AF, she will sit down and rest.  Biggest symptoms are fatigue and weakness.  She has been having some diarrhea since starting the Tikosyn, usually a few  "times a week but states it is well controlled if she taken an Imodium.  She states it is not overly bothersome for her.  She denies any c/o SOB, CP, LH, or dizziness. Denies any hospitalizations, ER visits, bleeding on Eliquis, or TIA/CVA symptoms. Overall feels well.    ROS:  General:  + fatigue, no weight gain or loss  Cardiovascular:  Denies CP, PND, syncope, near syncope, + LE edema + palpitations.  Pulmonary:  Denies GARVEY, cough, or wheezing      Vitals:    11/29/18 1322   BP: 124/78   BP Location: Right arm   Patient Position: Sitting   Pulse: 80   Weight: 84.7 kg (186 lb 12.8 oz)   Height: 161.3 cm (63.5\")     PE:  General: NAD  Neck: no JVD, no carotid bruits, no TM  Heart RRR, NL S1, S2, no rubs, 2/6 systolic ejection murmur  Lungs: CTA, no wheezes, rhonchi, or rales  Abd: soft, non-tender, NL BS  Ext: No musculoskeletal deformities, trace LE edema, no cyanosis, or clubbing  Psych: normal mood and affect    Diagnostic Data:        ECG 12 Lead  Date/Time: 11/29/2018 1:34 PM  Performed by: Blanca Lane APRN  Authorized by: Blanca Lane APRN   Comparison: compared with previous ECG from 9/27/2018  Similar to previous ECG  Rhythm: paced  BPM: 80  Comments: QTc: 462 ms            1. Paroxysmal atrial fibrillation (CMS/[facility])    2. SSS (sick sinus syndrome) (CMS/[facility])    3. Essential hypertension      Device interrogation 11/29/18: Biotronik PPM with normal function, 54% RA paced, 14% RV paced, 7 years battery life, 19% AF burden      Plan:  1) Paroxysmal atrial fibrillation:  - Off Flecainide due to increasing episodes of AF, initiated on Tikosyn 09/2018.  On today's device interrogation, she has had 19% AF burden, mostly rate controlled, only occasional VHR.  She feels her episodes have improved since starting Tikosyn.  She feels her diarrhea is tolerable and does not wish to make any changes at this time.  2) Anticoagulation:  - Continue Eliquis  3) SSS:  - S/p PPM implant 2017, device " with normal function  4) HTN:  - Well controlled  - Wt loss, exercise, salt reduction    F/up in 6 months    [facility]  11/29/2018  1:34 PM

## 2018-11-29 ENCOUNTER — OFFICE VISIT (OUTPATIENT)
Dept: CARDIOLOGY | Facility: CLINIC | Age: 78
End: 2018-11-29

## 2018-11-29 VITALS
SYSTOLIC BLOOD PRESSURE: 124 MMHG | BODY MASS INDEX: 31.89 KG/M2 | HEART RATE: 80 BPM | DIASTOLIC BLOOD PRESSURE: 78 MMHG | HEIGHT: 64 IN | WEIGHT: 186.8 LBS

## 2018-11-29 DIAGNOSIS — I48.0 PAROXYSMAL ATRIAL FIBRILLATION (HCC): Primary | ICD-10-CM

## 2018-11-29 DIAGNOSIS — I49.5 SSS (SICK SINUS SYNDROME) (HCC): ICD-10-CM

## 2018-11-29 DIAGNOSIS — I10 ESSENTIAL HYPERTENSION: ICD-10-CM

## 2018-11-29 PROCEDURE — 99213 OFFICE O/P EST LOW 20 MIN: CPT | Performed by: NURSE PRACTITIONER

## 2018-11-29 PROCEDURE — 93280 PM DEVICE PROGR EVAL DUAL: CPT | Performed by: NURSE PRACTITIONER

## 2018-12-03 RX ORDER — METOPROLOL TARTRATE 50 MG/1
TABLET, FILM COATED ORAL
Qty: 60 TABLET | Refills: 4 | Status: SHIPPED | OUTPATIENT
Start: 2018-12-03 | End: 2019-01-10

## 2018-12-28 ENCOUNTER — TELEPHONE (OUTPATIENT)
Dept: CARDIOLOGY | Facility: CLINIC | Age: 78
End: 2018-12-28

## 2018-12-28 NOTE — TELEPHONE ENCOUNTER
Patient missed a dose of Tikosyn on Sunday and she was in A-fib for 3 days. Today she is back in rhythm and feels fine. I explained to her the importance of taking Tikosyn at the same time every day. She verbalized understanding.

## 2019-01-03 ENCOUNTER — TELEPHONE (OUTPATIENT)
Dept: CARDIOLOGY | Facility: CLINIC | Age: 79
End: 2019-01-03

## 2019-01-03 DIAGNOSIS — I48.0 PAROXYSMAL ATRIAL FIBRILLATION (HCC): Primary | ICD-10-CM

## 2019-01-03 NOTE — TELEPHONE ENCOUNTER
PT has been in Afib for a week now with bp 158/100 HR 93, 171/106 HR 90. She missed one dose of Tikosyn that started the Afib episode. She feels bad and is looking for some relief. Current meds  apixaban 5mg 1 bid   aspirin 81 MG 1 daily  dofetilide 250mcg 1 bid   losartan 100mg daily  metoprolol tartrate 50mg 1 bid

## 2019-01-04 NOTE — TELEPHONE ENCOUNTER
I spoke with patient per GFT recommendation.  Patient verbalized understanding and wishes to schedule.  Order in epic.

## 2019-01-07 ENCOUNTER — HOSPITAL ENCOUNTER (OUTPATIENT)
Dept: CARDIOLOGY | Facility: HOSPITAL | Age: 79
Discharge: HOME | End: 2019-01-07
Attending: INTERNAL MEDICINE | Admitting: INTERNAL MEDICINE

## 2019-01-07 VITALS
DIASTOLIC BLOOD PRESSURE: 97 MMHG | SYSTOLIC BLOOD PRESSURE: 198 MMHG | OXYGEN SATURATION: 96 % | HEART RATE: 80 BPM | RESPIRATION RATE: 16 BRPM

## 2019-01-07 DIAGNOSIS — G89.29 CHRONIC HIP PAIN, LEFT: Primary | ICD-10-CM

## 2019-01-07 DIAGNOSIS — M25.552 CHRONIC HIP PAIN, LEFT: Primary | ICD-10-CM

## 2019-01-07 DIAGNOSIS — I48.0 PAROXYSMAL ATRIAL FIBRILLATION (HCC): ICD-10-CM

## 2019-01-07 LAB
ANION GAP SERPL CALCULATED.3IONS-SCNC: 5 MMOL/L (ref 3–11)
BUN BLD-MCNC: 20 MG/DL (ref 9–23)
BUN/CREAT SERPL: 22.5 (ref 7–25)
CALCIUM SPEC-SCNC: 9.5 MG/DL (ref 8.7–10.4)
CHLORIDE SERPL-SCNC: 105 MMOL/L (ref 99–109)
CO2 SERPL-SCNC: 29 MMOL/L (ref 20–31)
CREAT BLD-MCNC: 0.89 MG/DL (ref 0.6–1.3)
GFR SERPL CREATININE-BSD FRML MDRD: 61 ML/MIN/1.73
GLUCOSE BLD-MCNC: 96 MG/DL (ref 70–100)
POTASSIUM BLD-SCNC: 3.7 MMOL/L (ref 3.5–5.5)
SODIUM BLD-SCNC: 139 MMOL/L (ref 132–146)

## 2019-01-07 PROCEDURE — 36415 COLL VENOUS BLD VENIPUNCTURE: CPT

## 2019-01-07 PROCEDURE — 80048 BASIC METABOLIC PNL TOTAL CA: CPT | Performed by: INTERNAL MEDICINE

## 2019-01-07 NOTE — PROGRESS NOTES
Patient presented today for ECV for recurrent atrial fibrillation.  Upon arrival, patient was noted to be in an A paced rhythm per telemetry.  Unsure of when she converted.  ECV cancelled.  She will continue her Tikosyn and Eliquis.  Instructed the patient that she can take an extra 1/2 dose of metoprolol (25 mg) for any AF episodes and she verbalizes understanding.  She is stable and will be discharged home with plan for follow up with [facility] clinic in 3 months with device check.    Electronically signed by PATSY Rosado, 01/07/19, 2:07 PM.

## 2019-01-09 ENCOUNTER — CLINICAL SUPPORT NO REQUIREMENTS (OUTPATIENT)
Dept: CARDIOLOGY | Facility: CLINIC | Age: 79
End: 2019-01-09

## 2019-01-09 DIAGNOSIS — I48.0 PAROXYSMAL ATRIAL FIBRILLATION (HCC): ICD-10-CM

## 2019-01-09 DIAGNOSIS — I49.5 SSS (SICK SINUS SYNDROME) (HCC): ICD-10-CM

## 2019-01-09 PROCEDURE — 93296 REM INTERROG EVL PM/IDS: CPT | Performed by: INTERNAL MEDICINE

## 2019-01-09 PROCEDURE — 93294 REM INTERROG EVL PM/LDLS PM: CPT | Performed by: INTERNAL MEDICINE

## 2019-01-10 ENCOUNTER — TELEPHONE (OUTPATIENT)
Dept: CARDIOLOGY | Facility: CLINIC | Age: 79
End: 2019-01-10

## 2019-01-10 RX ORDER — METOPROLOL TARTRATE 100 MG/1
100 TABLET ORAL 2 TIMES DAILY
Qty: 60 TABLET | Refills: 11 | Status: ON HOLD | OUTPATIENT
Start: 2019-01-10 | End: 2020-01-30 | Stop reason: DRUGHIGH

## 2019-01-10 NOTE — TELEPHONE ENCOUNTER
Spoke with patient regarding increased AF burden (60%) per device interrogation.  Instructed patient to increase her metoprolol to 100 mg bid.  She verbalizes understanding.  New prescription sent.  If she continues to have high burden of AF, can consider PVA versus AVN ablation.    [facility]  1/10/2019  10:20 AM

## 2019-01-15 ENCOUNTER — OFFICE VISIT (OUTPATIENT)
Dept: ORTHOPEDIC SURGERY | Facility: CLINIC | Age: 79
End: 2019-01-15

## 2019-01-15 VITALS — HEIGHT: 64 IN | OXYGEN SATURATION: 98 % | HEART RATE: 102 BPM | WEIGHT: 187.8 LBS | BODY MASS INDEX: 32.06 KG/M2

## 2019-01-15 DIAGNOSIS — M16.12 PRIMARY OSTEOARTHRITIS OF LEFT HIP: Primary | ICD-10-CM

## 2019-01-15 DIAGNOSIS — M25.552 PAIN OF LEFT HIP JOINT: ICD-10-CM

## 2019-01-15 PROCEDURE — 99204 OFFICE O/P NEW MOD 45 MIN: CPT | Performed by: ORTHOPAEDIC SURGERY

## 2019-01-15 RX ORDER — ACETAMINOPHEN 325 MG/1
1000 TABLET ORAL ONCE
Status: CANCELLED | OUTPATIENT
Start: 2019-01-15 | End: 2019-01-15

## 2019-01-15 RX ORDER — OXYCODONE HCL 10 MG/1
10 TABLET, FILM COATED, EXTENDED RELEASE ORAL ONCE
Status: CANCELLED | OUTPATIENT
Start: 2019-01-15 | End: 2019-01-15

## 2019-01-15 NOTE — PROGRESS NOTES
Orthopaedic Clinic Note: Hip New Patient    Chief Complaint   Patient presents with   • Left Hip - Pain        HPI    Kenisha Ruvalcaba is a 78 y.o. female who presents with left hip pain for 3 year(s). Onset has been atraumatic and gradual in nature.  Pain is localized to the groin of the left hip.  She has significant pain with ambulation.  She is ambulating with the assistance of a cane today.  Her pain is worse with walking, standing, weightbearing activities.  Sitting and resting improves her pain.  Previous treatments have included acupuncture, injections, and therapy.  Despite these interventions, her pain is persisting and causing significant limitations in daily activities.  Of note, the patient does have a significant cardiac history including sick sinus syndrome and has a pacemaker.  She is also on Eliquis.    Past Medical History:   Diagnosis Date   • AF (atrial fibrillation) (CMS/HCC)    • Anxiety    • Arrhythmia     atrial fibrillation   • Arthritis     Left hip   • Carotid artery occlusion    • Chronic hip pain, left 1/7/2019   • Coronary artery disease    • Dyslipidemia    • H/O echocardiogram 09/10/2010    Left atrium 4.6. Moderate left atrial enlargement. Ejection fraction 55%. Severe right atrial enlargement and 2+ tricuspid regurgitation with RVSP 37mmHg.    • H/O echocardiogram 12/03/2010    Moderate mitral regurgitation, moderate tricuspid regurgitation, normal left ventricular size and function, negative bubble study.    • History of cardioversion 12/03/2010    initiation of Tambocor and subsequent external cardioversion.   • History of Doppler ultrasound 03/01/2013    carotid ultrasound, with no significant stenosis    • Hyperlipidemia    • Hypertension    • Hypothyroidism    • Paroxysmal atrial fibrillation (CMS/HCC)    • SSS (sick sinus syndrome) (CMS/HCC)    • Valvular heart disease    • Vertigo    • Wears glasses       Past Surgical History:   Procedure Laterality Date   • BREAST BIOPSY Left      MARKER IN PLACE   • CARDIAC CATHETERIZATION Left 6/16/2016    Procedure: Cardiac catheterization;  Surgeon: Chuck Clark MD;  Location:  [facility] CATH INVASIVE LOCATION;  Service:    • CARDIAC ELECTROPHYSIOLOGY PROCEDURE N/A 2/17/2017    Procedure: Pacemaker DC new;  Surgeon: Darryl Swenson MD;  Location:  [facility] EP INVASIVE LOCATION;  Service:    • CAROTID ENDARTERECTOMY Left    • CATARACT EXTRACTION Bilateral    • COLONOSCOPY     • TUBAL ABDOMINAL LIGATION        Family History   Problem Relation Age of Onset   • Alzheimer's disease Mother    • Cancer Mother    • Aortic aneurysm Father    • Heart valve disorder Sister      Social History     Socioeconomic History   • Marital status:      Spouse name: Not on file   • Number of children: Not on file   • Years of education: Not on file   • Highest education level: Not on file   Social Needs   • Financial resource strain: Not on file   • Food insecurity - worry: Not on file   • Food insecurity - inability: Not on file   • Transportation needs - medical: Not on file   • Transportation needs - non-medical: Not on file   Occupational History   • Occupation: retired   Tobacco Use   • Smoking status: Never Smoker   • Smokeless tobacco: Never Used   Substance and Sexual Activity   • Alcohol use: Yes     Alcohol/week: 0.6 oz     Types: 1 Glasses of wine per week     Frequency: Monthly or less   • Drug use: No   • Sexual activity: Defer   Other Topics Concern   • Not on file   Social History Narrative    Denies caffeine use. Lives at home with .       Current Outpatient Medications on File Prior to Visit   Medication Sig Dispense Refill   • apixaban (ELIQUIS) 5 MG tablet tablet Take 1 tablet by mouth Every 12 (Twelve) Hours. 60 tablet 5   • aspirin 81 MG EC tablet Take 81 mg by mouth Every Night.     • cholecalciferol (VITAMIN D3) 1000 UNITS tablet Take 1,000 Units by mouth Daily.     • coenzyme Q10 100 MG capsule Take 100 mg by mouth Daily.    "  • dofetilide (TIKOSYN) 250 MCG capsule Take 1 capsule by mouth Every 12 (Twelve) Hours. 60 capsule 6   • levothyroxine (SYNTHROID, LEVOTHROID) 25 MCG tablet Take 25 mcg by mouth Every Morning.     • losartan (COZAAR) 100 MG tablet Take 1 tablet by mouth Daily. 90 tablet 1   • meclizine (ANTIVERT) 25 MG tablet Take 25 mg by mouth 3 (Three) Times a Day As Needed for dizziness.     • metoprolol tartrate (LOPRESSOR) 100 MG tablet Take 1 tablet by mouth 2 (Two) Times a Day. 60 tablet 11   • nitroglycerin (NITROSTAT) 0.4 MG SL tablet Place 0.4 mg under the tongue Every 5 (Five) Minutes As Needed for chest pain. Take no more than 3 doses in 15 minutes.     • PARoxetine (PAXIL) 10 MG tablet Take 20 mg by mouth Every Night.     • rosuvastatin (CRESTOR) 10 MG tablet Take 20 mg by mouth Every Night. TAKES ON WEDNESDAYS AND SATURDAYS ONLY       No current facility-administered medications on file prior to visit.       Allergies   Allergen Reactions   • Beta Adrenergic Blockers Dizziness     DIZZINESS    • Sulfa Antibiotics Rash     RASH         Review of Systems   Constitutional: Positive for fatigue.   Eyes: Negative.    Respiratory: Positive for cough, chest tightness and shortness of breath.    Cardiovascular: Negative.    Gastrointestinal: Positive for diarrhea and nausea.   Endocrine: Negative.    Genitourinary: Negative.    Musculoskeletal: Positive for arthralgias.   Skin: Negative.    Allergic/Immunologic: Negative.    Neurological: Positive for tremors, weakness and headaches.   Hematological: Bruises/bleeds easily.   Psychiatric/Behavioral: Positive for sleep disturbance.        The following portions of the patient's history were reviewed and updated as appropriate: allergies, current medications, past family history, past medical history, past social history, past surgical history and problem list.    Physical Exam  Pulse 102, height 161.3 cm (63.5\"), weight 85.2 kg (187 lb 12.8 oz), SpO2 98 %, not currently " breastfeeding.    Body mass index is 32.75 kg/m².    GENERAL APPEARANCE: awake, alert & oriented x 3, in no acute distress and well developed, well nourished  PSYCH: normal affect  LUNGS:  breathing nonlabored  EYES: sclera anicteric  CARDIOVASCULAR: palpable dorsalis pedis, palpable posterior tibial bilaterally. Capillary refill less than 2 seconds  EXTREMITIES: no clubbing, cyanosis  GAIT:  Antalgic           Right Hip Exam:  RANGE OF MOTION:   FLEXION CONTRACTURE: None   FLEXION: 110 degrees   INTERNAL ROTATION: neutral at 90 degrees of flexion   EXTERNAL ROTATION: 35 degrees at 90 degrees of flexion    PAIN WITH HIP MOTION: no  PAIN WITH LOGROLL: no  STINCHFIELD TEST: negative    KNEE EXAM: full knee ROM (0-120), stable to varus/valgus stress at terminal extension and 30 degrees     STRENGTH:  5/5 hip adduction, abduction, flexion. 5/5 strength knee flexion, extension. 5/5 strength ankle dorsiflexion and plantarflexion.     GREATER TROCHANTER BURSAL PAIN:  no     REFLEXES:   PATELLAR 2+/4   ACHILLES 2+/4    CLONUS: negative  STRAIGHT LEG TEST:   negative    SENSATION TO LIGHT TOUCH:  DEEP PERONEAL/SUPERFICIAL PERONEAL/SURAL/SAPHENOUS/TIBIAL:  intact    EDEMA:   Trace pitting edema at ankle   ERYTHEMA:  no  WOUNDS/INCISIONS: none, no overlying skin problems.      Left Hip Exam:   RANGE OF MOTION:   FLEXION CONTRACTURE: None  FLEXION: 100 degrees  INTERNAL ROTATION: -5 degrees at 90 degrees of flexion   EXTERNAL ROTATION: 25 degrees at 90 degrees of flexion    PAIN WITH HIP MOTION: yes  PAIN WITH LOGROLL: no  STINCHFIELD TEST: positive    KNEE EXAM: full knee ROM (0-120), stable to varus/valgus stress at terminal extension and 30 degrees     STRENGTH:  4/5 hip adduction, abduction, flexion. 5/5 knee flexion, extension. 5/5 ankle dorsiflexion and plantarflexion.     GREATER TROCHANTER BURSAL PAIN:  yes     REFLEXES:   PATELLAR 2+/4   ACHILLES 2+/4    CLONUS: no  STRAIGHT LEG TEST:   negative    SENSATION TO LIGHT  TOUCH:  DEEP PERONEAL/SUPERFICIAL PERONEAL/SURAL/SAPHENOUS/TIBIAL:  intact    EDEMA:   Trace pitting edema at ankle   ERYTHEMA:  no  WOUNDS/INCISIONS: no        ------------------------------------------------------------------    LEG LENGTHS:  equal  _____________________________________________________  _____________________________________________________    RADIOGRAPHIC FINDINGS:   Indication: Left hip pain     Comparison: Todays xrays were compared to previous xrays from 5/25/17     AP pelvis, hip 2 views: Right: moderate to severe joint space narrowing,  there are marginal osteophytes visualized at the femoral head-neck junction and the margins of the acetabulum; Left: moderate to severe joint space narrowing,  there are marginal osteophytes visualized at the femoral head-neck junction and the margins of the acetabulum.  No significant changes compared to prior radiographs.      Assessment/Plan:   Diagnosis Plan   1. Primary osteoarthritis of left hip  Case Request    Pregnancy, Urine - Urine, Clean Catch    CBC and Differential    Basic metabolic panel    Protime-INR    APTT    Hemoglobin A1c    Urinalysis With Culture If Indicated - Urine, Clean Catch    ECG 12 Lead    Nicotine & Metabolite, Quant    Tranexamic Acid 1,000 mg in Sodium chloride 0.9 % 100 mL    Tranexamic Acid 1,000 mg in Sodium chloride 0.9 % 100 mL    ceFAZolin (ANCEF) 2 g in Sodium chloride 0.9 % 100 mL IVPB    acetaminophen (TYLENOL) tablet 975 mg    mupirocin (BACTROBAN) 2 % nasal ointment 1 application    oxyCODONE (oxyCONTIN) 12 hr tablet 10 mg    Case Request   2. Pain of left hip joint  XR Hip With or Without Pelvis 2 - 3 View Left     Patient has failed conservative intervention is having significant limitations in daily activities resulting in decreased quality of life.  Given her significant cardiac history, she is high risk for surgery.  However given the amount of debilitation she is exhibiting, I believe she would benefit from  total hip arthroplasty despite the risks.  She is agreeable to proceeding with surgery.    The patient has clinical and radiographic evidence of end-stage left hip joint degeneration. Conservative measures have been tried for 3 months or longer, but have failed to adequately treat or improve the patient's symptoms. Pain is restricting the patient's daily activities as well as quality of life. The recommendation at this time is to proceed with a left total hip arthroplasty with the goal to improve patient function and pain. The risks, benefits, potential complications, and alternatives were discussed with the patient in detail. Risks included but were not limited to bleeding, infection, anesthesia risks, damage to neurovascular structures, osteolysis, aseptic loosening, instability, dislocation, pain, continued pain, iatrogenic fracture, leg length discrepancy, possible need for future surgery including the potential for amputation, blood clots, myocardial infarction, stroke, and death. Akanksha-operative blood management and the potential for blood transfusion were discussed with risks and options clearly outlined. Specific details of the surgical procedure, hospitalization, recovery, rehabilitation, and long-term precautions were also presented. Pre-operative teaching was provided. Implant/prosthesis selection was outlined, and the many options available were explained; the final choice will be made at the time of the procedure to match the anatomy and condition of the bone, ligaments, tendons, and muscles. Given this instruction, the patient elected to proceed with the left total hip arthroplasty. The patient will be seen by pre-admission testing for pre-operative optimization and risk assessment and will be scheduled for surgery once this is completed.    The patient is considered high risk for DVT based on patient risk factors and will be placed on Lovenox bridge to home Eliquis postoperatively for DVT  prophylaxis.    Patient will need cardiac clearance for surgery.    Freddie Vale MD  01/15/19  3:11 PM

## 2019-01-17 ENCOUNTER — APPOINTMENT (OUTPATIENT)
Dept: PREADMISSION TESTING | Facility: HOSPITAL | Age: 79
End: 2019-01-17

## 2019-01-17 VITALS — WEIGHT: 187.25 LBS | BODY MASS INDEX: 31.97 KG/M2 | HEIGHT: 64 IN

## 2019-01-17 DIAGNOSIS — M16.12 PRIMARY OSTEOARTHRITIS OF LEFT HIP: ICD-10-CM

## 2019-01-17 LAB
ANION GAP SERPL CALCULATED.3IONS-SCNC: 8 MMOL/L (ref 3–11)
APTT PPP: 44.3 SECONDS (ref 24–37)
BACTERIA UR QL AUTO: ABNORMAL /HPF
BASOPHILS # BLD AUTO: 0.03 10*3/MM3 (ref 0–0.2)
BASOPHILS NFR BLD AUTO: 0.4 % (ref 0–1)
BILIRUB UR QL STRIP: ABNORMAL
BUN BLD-MCNC: 18 MG/DL (ref 9–23)
BUN/CREAT SERPL: 17 (ref 7–25)
CALCIUM SPEC-SCNC: 9.2 MG/DL (ref 8.7–10.4)
CHLORIDE SERPL-SCNC: 103 MMOL/L (ref 99–109)
CLARITY UR: ABNORMAL
CO2 SERPL-SCNC: 32 MMOL/L (ref 20–31)
COD CRY URNS QL: ABNORMAL /HPF
COLOR UR: YELLOW
CREAT BLD-MCNC: 1.06 MG/DL (ref 0.6–1.3)
DEPRECATED RDW RBC AUTO: 48.8 FL (ref 37–54)
EOSINOPHIL # BLD AUTO: 0.12 10*3/MM3 (ref 0–0.3)
EOSINOPHIL NFR BLD AUTO: 1.5 % (ref 0–3)
ERYTHROCYTE [DISTWIDTH] IN BLOOD BY AUTOMATED COUNT: 14.4 % (ref 11.3–14.5)
GFR SERPL CREATININE-BSD FRML MDRD: 50 ML/MIN/1.73
GLUCOSE BLD-MCNC: 97 MG/DL (ref 70–100)
GLUCOSE UR STRIP-MCNC: NEGATIVE MG/DL
HBA1C MFR BLD: 6.4 % (ref 4.8–5.6)
HCT VFR BLD AUTO: 41.7 % (ref 34.5–44)
HGB BLD-MCNC: 12.6 G/DL (ref 11.5–15.5)
HGB UR QL STRIP.AUTO: ABNORMAL
HYALINE CASTS UR QL AUTO: ABNORMAL /LPF
IMM GRANULOCYTES # BLD AUTO: 0.01 10*3/MM3 (ref 0–0.03)
IMM GRANULOCYTES NFR BLD AUTO: 0.1 % (ref 0–0.6)
INR PPP: 1.48 (ref 0.85–1.16)
KETONES UR QL STRIP: ABNORMAL
LEUKOCYTE ESTERASE UR QL STRIP.AUTO: NEGATIVE
LYMPHOCYTES # BLD AUTO: 1.79 10*3/MM3 (ref 0.6–4.8)
LYMPHOCYTES NFR BLD AUTO: 23.1 % (ref 24–44)
MCH RBC QN AUTO: 28 PG (ref 27–31)
MCHC RBC AUTO-ENTMCNC: 30.2 G/DL (ref 32–36)
MCV RBC AUTO: 92.7 FL (ref 80–99)
MONOCYTES # BLD AUTO: 0.88 10*3/MM3 (ref 0–1)
MONOCYTES NFR BLD AUTO: 11.4 % (ref 0–12)
NEUTROPHILS # BLD AUTO: 4.93 10*3/MM3 (ref 1.5–8.3)
NEUTROPHILS NFR BLD AUTO: 63.6 % (ref 41–71)
NITRITE UR QL STRIP: NEGATIVE
PH UR STRIP.AUTO: 6 [PH] (ref 5–8)
PLATELET # BLD AUTO: 319 10*3/MM3 (ref 150–450)
PMV BLD AUTO: 10.2 FL (ref 6–12)
POTASSIUM BLD-SCNC: 3.4 MMOL/L (ref 3.5–5.5)
PROT UR QL STRIP: ABNORMAL
PROTHROMBIN TIME: 17.3 SECONDS (ref 11.2–14.3)
RBC # BLD AUTO: 4.5 10*6/MM3 (ref 3.89–5.14)
RBC # UR: ABNORMAL /HPF
REF LAB TEST METHOD: ABNORMAL
SODIUM BLD-SCNC: 143 MMOL/L (ref 132–146)
SP GR UR STRIP: 1.02 (ref 1–1.03)
SQUAMOUS #/AREA URNS HPF: ABNORMAL /HPF
UROBILINOGEN UR QL STRIP: ABNORMAL
WBC NRBC COR # BLD: 7.75 10*3/MM3 (ref 3.5–10.8)
WBC UR QL AUTO: ABNORMAL /HPF

## 2019-01-17 PROCEDURE — 80048 BASIC METABOLIC PNL TOTAL CA: CPT | Performed by: ORTHOPAEDIC SURGERY

## 2019-01-17 PROCEDURE — 83036 HEMOGLOBIN GLYCOSYLATED A1C: CPT | Performed by: ORTHOPAEDIC SURGERY

## 2019-01-17 PROCEDURE — 93010 ELECTROCARDIOGRAM REPORT: CPT | Performed by: INTERNAL MEDICINE

## 2019-01-17 PROCEDURE — 81001 URINALYSIS AUTO W/SCOPE: CPT | Performed by: ORTHOPAEDIC SURGERY

## 2019-01-17 PROCEDURE — 85025 COMPLETE CBC W/AUTO DIFF WBC: CPT | Performed by: ORTHOPAEDIC SURGERY

## 2019-01-17 PROCEDURE — 36415 COLL VENOUS BLD VENIPUNCTURE: CPT

## 2019-01-17 PROCEDURE — 85610 PROTHROMBIN TIME: CPT | Performed by: ORTHOPAEDIC SURGERY

## 2019-01-17 PROCEDURE — 93005 ELECTROCARDIOGRAM TRACING: CPT

## 2019-01-17 PROCEDURE — 85730 THROMBOPLASTIN TIME PARTIAL: CPT | Performed by: ORTHOPAEDIC SURGERY

## 2019-01-17 PROCEDURE — G0480 DRUG TEST DEF 1-7 CLASSES: HCPCS | Performed by: ORTHOPAEDIC SURGERY

## 2019-01-17 ASSESSMENT — HOOS JR
HOOS JR SCORE: 39.902
HOOS JR SCORE: 16

## 2019-01-18 ENCOUNTER — TELEPHONE (OUTPATIENT)
Dept: ORTHOPEDIC SURGERY | Facility: CLINIC | Age: 79
End: 2019-01-18

## 2019-01-18 NOTE — TELEPHONE ENCOUNTER
FRANCOIS called saying patient had some abnormal labs.   K was 3.4  PT 17.3  INR 1.4  UA was also abnormal but patient had to leave for another Dr's Appointment and she will come back afterwards and they will do an In and Out cath. I didn't know what to do with this info sorry.  Emy

## 2019-01-21 ENCOUNTER — DOCUMENTATION (OUTPATIENT)
Dept: SOCIAL WORK | Facility: HOSPITAL | Age: 79
End: 2019-01-21

## 2019-01-21 NOTE — PROGRESS NOTES
1/21/19-  I spoke with Mrs Ruvalcaba re: d/c plan for upcoming surgery on 1/28. She lives with  who she states is unable to help her at home due to some of his medical problems. She will be having a THR so will be inpatient. She has requested a referral to [facility]. With her permission I spoke with Blanca at [facility] who accepted referral. Advised Mrs Ruvalcaba that [facility] may not have skilled beds available. She was reluctant to consider anywhere else. Advised her to go visit [facility] as a possible choice if [facility] is full. No other questions verbalized  S.Kellerman, RN  Case mgt   Abdominal distention and masses absent/Liver palpable < 2 cm below rib margin with sharp edge/Adequate bowel sound pattern for age/Spleen tip absend or slightly below rib margin/Normal contour/Umbilicus with 3 vessels, normal color size and texture/Abdominal wall defects absent/Nontender/Scaphoid abdomen absent

## 2019-01-22 ENCOUNTER — TELEPHONE (OUTPATIENT)
Dept: CARDIOLOGY | Facility: CLINIC | Age: 79
End: 2019-01-22

## 2019-01-22 DIAGNOSIS — I48.0 PAROXYSMAL ATRIAL FIBRILLATION (HCC): Primary | ICD-10-CM

## 2019-01-22 NOTE — TELEPHONE ENCOUNTER
PT wants to know what she can do her Afib is worse. Last interrogation was 1/9/2019 with 60% mode switch and you increased her Metoprolol to 100mg 1 bid and she states that this is not working and she can barely get things done. Per Blanca Lane if she continues to have then consider PVA vs AVN ablation. Please advise.

## 2019-01-23 ENCOUNTER — TELEPHONE (OUTPATIENT)
Dept: CARDIOLOGY | Facility: CLINIC | Age: 79
End: 2019-01-23

## 2019-01-23 LAB
COTININE UR-MCNC: NORMAL NG/ML
NICOTINE SERPL-MCNC: NORMAL NG/ML

## 2019-01-23 NOTE — PAT
Spoke with Darling at Dr. Swenson's office about clearance for patient since patient did not go see him, only telephone notes which only speaks of her afib and needing an ablation.  She will check with him and see if he is going to clear patient or not.    Also called patient she is coming in am for urine cath, as she did not come back after class to get cath Second attempt for her to come.

## 2019-01-24 ENCOUNTER — TELEPHONE (OUTPATIENT)
Dept: CARDIOLOGY | Facility: CLINIC | Age: 79
End: 2019-01-24

## 2019-01-24 ENCOUNTER — APPOINTMENT (OUTPATIENT)
Dept: PREADMISSION TESTING | Facility: HOSPITAL | Age: 79
End: 2019-01-24

## 2019-01-24 LAB
BACTERIA UR QL AUTO: ABNORMAL /HPF
BILIRUB UR QL STRIP: NEGATIVE
CLARITY UR: CLEAR
COLOR UR: YELLOW
GLUCOSE UR STRIP-MCNC: NEGATIVE MG/DL
HGB UR QL STRIP.AUTO: ABNORMAL
HYALINE CASTS UR QL AUTO: ABNORMAL /LPF
KETONES UR QL STRIP: NEGATIVE
LEUKOCYTE ESTERASE UR QL STRIP.AUTO: NEGATIVE
NITRITE UR QL STRIP: NEGATIVE
PH UR STRIP.AUTO: 7 [PH] (ref 5–8)
PROT UR QL STRIP: NEGATIVE
RBC # UR: ABNORMAL /HPF
REF LAB TEST METHOD: ABNORMAL
SP GR UR STRIP: 1.01 (ref 1–1.03)
SQUAMOUS #/AREA URNS HPF: ABNORMAL /HPF
UROBILINOGEN UR QL STRIP: ABNORMAL
WBC UR QL AUTO: ABNORMAL /HPF

## 2019-01-24 PROCEDURE — 81001 URINALYSIS AUTO W/SCOPE: CPT | Performed by: ORTHOPAEDIC SURGERY

## 2019-01-24 PROCEDURE — 87086 URINE CULTURE/COLONY COUNT: CPT | Performed by: ORTHOPAEDIC SURGERY

## 2019-01-24 NOTE — TELEPHONE ENCOUNTER
"Kenisha called to let us know her BP was 155/89 yesterday and today it was 134/66. She mentioned the \"new BP medication\" was working and I looked back and didn't see anything new in the last few days. I called back to leave her a message see if she was started on something that we didn't have documented.  "

## 2019-01-26 LAB — BACTERIA SPEC AEROBE CULT: NORMAL

## 2019-01-29 ENCOUNTER — ANESTHESIA EVENT (OUTPATIENT)
Dept: PERIOP | Facility: HOSPITAL | Age: 79
End: 2019-01-29

## 2019-01-29 RX ORDER — SODIUM CHLORIDE 0.9 % (FLUSH) 0.9 %
3 SYRINGE (ML) INJECTION EVERY 12 HOURS SCHEDULED
Status: CANCELLED | OUTPATIENT
Start: 2019-01-29

## 2019-01-29 RX ORDER — FAMOTIDINE 10 MG/ML
20 INJECTION, SOLUTION INTRAVENOUS ONCE
Status: CANCELLED | OUTPATIENT
Start: 2019-01-29 | End: 2019-01-29

## 2019-01-30 ENCOUNTER — HOSPITAL ENCOUNTER (INPATIENT)
Facility: HOSPITAL | Age: 79
LOS: 1 days | Discharge: NURSING, REHAB OR LONG-TERM CARE | End: 2019-01-31
Attending: ORTHOPAEDIC SURGERY | Admitting: ORTHOPAEDIC SURGERY

## 2019-01-30 ENCOUNTER — HOSPITAL ENCOUNTER (EMERGENCY)
Facility: HOSPITAL | Age: 79
Discharge: HOME | End: 2019-01-30
Attending: EMERGENCY MEDICINE | Admitting: EMERGENCY MEDICINE

## 2019-01-30 ENCOUNTER — APPOINTMENT (OUTPATIENT)
Dept: GENERAL RADIOLOGY | Facility: HOSPITAL | Age: 79
End: 2019-01-30

## 2019-01-30 ENCOUNTER — APPOINTMENT (OUTPATIENT)
Dept: CT IMAGING | Facility: HOSPITAL | Age: 79
End: 2019-01-30

## 2019-01-30 ENCOUNTER — ANESTHESIA (OUTPATIENT)
Dept: PERIOP | Facility: HOSPITAL | Age: 79
End: 2019-01-30

## 2019-01-30 VITALS
HEART RATE: 86 BPM | WEIGHT: 175 LBS | OXYGEN SATURATION: 94 % | SYSTOLIC BLOOD PRESSURE: 149 MMHG | HEIGHT: 64 IN | DIASTOLIC BLOOD PRESSURE: 62 MMHG | BODY MASS INDEX: 29.88 KG/M2 | RESPIRATION RATE: 14 BRPM | TEMPERATURE: 97.6 F

## 2019-01-30 DIAGNOSIS — R51.9 ACUTE NONINTRACTABLE HEADACHE, UNSPECIFIED HEADACHE TYPE: ICD-10-CM

## 2019-01-30 DIAGNOSIS — Z78.9 IMPAIRED MOBILITY AND ADLS: ICD-10-CM

## 2019-01-30 DIAGNOSIS — M16.12 PRIMARY OSTEOARTHRITIS OF LEFT HIP: ICD-10-CM

## 2019-01-30 DIAGNOSIS — I16.0 HYPERTENSIVE URGENCY: Primary | ICD-10-CM

## 2019-01-30 DIAGNOSIS — Z74.09 IMPAIRED FUNCTIONAL MOBILITY, BALANCE, GAIT, AND ENDURANCE: Primary | ICD-10-CM

## 2019-01-30 DIAGNOSIS — Z74.09 IMPAIRED MOBILITY AND ADLS: ICD-10-CM

## 2019-01-30 PROBLEM — E87.6 HYPOKALEMIA: Status: ACTIVE | Noted: 2019-01-30

## 2019-01-30 PROBLEM — R73.03 PREDIABETES: Status: ACTIVE | Noted: 2019-01-30

## 2019-01-30 PROBLEM — E03.9 HYPOTHYROID: Status: ACTIVE | Noted: 2019-01-30

## 2019-01-30 PROBLEM — Z96.642 STATUS POST TOTAL REPLACEMENT OF LEFT HIP: Status: ACTIVE | Noted: 2019-01-30

## 2019-01-30 LAB
ALBUMIN SERPL-MCNC: 4.65 G/DL (ref 3.2–4.8)
ALBUMIN/GLOB SERPL: 2.3 G/DL (ref 1.5–2.5)
ALP SERPL-CCNC: 98 U/L (ref 25–100)
ALT SERPL W P-5'-P-CCNC: 30 U/L (ref 7–40)
ANION GAP SERPL CALCULATED.3IONS-SCNC: 10 MMOL/L (ref 3–11)
AST SERPL-CCNC: 31 U/L (ref 0–33)
BASOPHILS # BLD AUTO: 0.03 10*3/MM3 (ref 0–0.2)
BASOPHILS NFR BLD AUTO: 0.2 % (ref 0–1)
BILIRUB SERPL-MCNC: 0.7 MG/DL (ref 0.3–1.2)
BUN BLD-MCNC: 17 MG/DL (ref 9–23)
BUN/CREAT SERPL: 20 (ref 7–25)
CALCIUM SPEC-SCNC: 9.4 MG/DL (ref 8.7–10.4)
CHLORIDE SERPL-SCNC: 103 MMOL/L (ref 99–109)
CO2 SERPL-SCNC: 29 MMOL/L (ref 20–31)
CREAT BLD-MCNC: 0.85 MG/DL (ref 0.6–1.3)
DEPRECATED RDW RBC AUTO: 49.2 FL (ref 37–54)
EOSINOPHIL # BLD AUTO: 0.13 10*3/MM3 (ref 0–0.3)
EOSINOPHIL NFR BLD AUTO: 1 % (ref 0–3)
ERYTHROCYTE [DISTWIDTH] IN BLOOD BY AUTOMATED COUNT: 14.6 % (ref 11.3–14.5)
GFR SERPL CREATININE-BSD FRML MDRD: 65 ML/MIN/1.73
GLOBULIN UR ELPH-MCNC: 2.1 GM/DL
GLUCOSE BLD-MCNC: 120 MG/DL (ref 70–100)
GLUCOSE BLDC GLUCOMTR-MCNC: 172 MG/DL (ref 70–130)
GLUCOSE BLDC GLUCOMTR-MCNC: 261 MG/DL (ref 70–130)
HCT VFR BLD AUTO: 43.8 % (ref 34.5–44)
HGB BLD-MCNC: 13.3 G/DL (ref 11.5–15.5)
HOLD SPECIMEN: NORMAL
IMM GRANULOCYTES # BLD AUTO: 0.04 10*3/MM3 (ref 0–0.03)
IMM GRANULOCYTES NFR BLD AUTO: 0.3 % (ref 0–0.6)
LYMPHOCYTES # BLD AUTO: 2.13 10*3/MM3 (ref 0.6–4.8)
LYMPHOCYTES NFR BLD AUTO: 16.7 % (ref 24–44)
MCH RBC QN AUTO: 27.9 PG (ref 27–31)
MCHC RBC AUTO-ENTMCNC: 30.4 G/DL (ref 32–36)
MCV RBC AUTO: 91.8 FL (ref 80–99)
MONOCYTES # BLD AUTO: 0.97 10*3/MM3 (ref 0–1)
MONOCYTES NFR BLD AUTO: 7.6 % (ref 0–12)
NEUTROPHILS # BLD AUTO: 9.48 10*3/MM3 (ref 1.5–8.3)
NEUTROPHILS NFR BLD AUTO: 74.5 % (ref 41–71)
PLATELET # BLD AUTO: 268 10*3/MM3 (ref 150–450)
PMV BLD AUTO: 10.2 FL (ref 6–12)
POTASSIUM BLD-SCNC: 3.2 MMOL/L (ref 3.5–5.5)
PROT SERPL-MCNC: 6.7 G/DL (ref 5.7–8.2)
RBC # BLD AUTO: 4.77 10*6/MM3 (ref 3.89–5.14)
SODIUM BLD-SCNC: 142 MMOL/L (ref 132–146)
WBC NRBC COR # BLD: 12.74 10*3/MM3 (ref 3.5–10.8)
WHOLE BLOOD HOLD SPECIMEN: NORMAL

## 2019-01-30 PROCEDURE — 25010000002 PROCHLORPERAZINE EDISYLATE PER 10 MG: Performed by: EMERGENCY MEDICINE

## 2019-01-30 PROCEDURE — 25010000002 DIPHENHYDRAMINE PER 50 MG: Performed by: EMERGENCY MEDICINE

## 2019-01-30 PROCEDURE — 25010000003 CEFAZOLIN IN DEXTROSE 2-4 GM/100ML-% SOLUTION: Performed by: ORTHOPAEDIC SURGERY

## 2019-01-30 PROCEDURE — 82962 GLUCOSE BLOOD TEST: CPT

## 2019-01-30 PROCEDURE — 25010000002 FENTANYL CITRATE (PF) 100 MCG/2ML SOLUTION: Performed by: NURSE ANESTHETIST, CERTIFIED REGISTERED

## 2019-01-30 PROCEDURE — 72170 X-RAY EXAM OF PELVIS: CPT

## 2019-01-30 PROCEDURE — 97116 GAIT TRAINING THERAPY: CPT

## 2019-01-30 PROCEDURE — 93005 ELECTROCARDIOGRAM TRACING: CPT | Performed by: EMERGENCY MEDICINE

## 2019-01-30 PROCEDURE — 70496 CT ANGIOGRAPHY HEAD: CPT

## 2019-01-30 PROCEDURE — 27130 TOTAL HIP ARTHROPLASTY: CPT | Performed by: ORTHOPAEDIC SURGERY

## 2019-01-30 PROCEDURE — 0 IOPAMIDOL PER 1 ML: Performed by: EMERGENCY MEDICINE

## 2019-01-30 PROCEDURE — 70450 CT HEAD/BRAIN W/O DYE: CPT

## 2019-01-30 PROCEDURE — 25010000002 KETOROLAC TROMETHAMINE PER 15 MG: Performed by: EMERGENCY MEDICINE

## 2019-01-30 PROCEDURE — 0SRB03A REPLACEMENT OF LEFT HIP JOINT WITH CERAMIC SYNTHETIC SUBSTITUTE, UNCEMENTED, OPEN APPROACH: ICD-10-PCS | Performed by: ORTHOPAEDIC SURGERY

## 2019-01-30 PROCEDURE — C1776 JOINT DEVICE (IMPLANTABLE): HCPCS | Performed by: ORTHOPAEDIC SURGERY

## 2019-01-30 PROCEDURE — 80053 COMPREHEN METABOLIC PANEL: CPT | Performed by: EMERGENCY MEDICINE

## 2019-01-30 PROCEDURE — 25010000002 PROPOFOL 10 MG/ML EMULSION: Performed by: NURSE ANESTHETIST, CERTIFIED REGISTERED

## 2019-01-30 PROCEDURE — 25010000002 ONDANSETRON PER 1 MG: Performed by: NURSE ANESTHETIST, CERTIFIED REGISTERED

## 2019-01-30 PROCEDURE — 25010000002 NEOSTIGMINE 10 MG/10ML SOLUTION: Performed by: NURSE ANESTHETIST, CERTIFIED REGISTERED

## 2019-01-30 PROCEDURE — 25010000002 KETOROLAC TROMETHAMINE PER 15 MG: Performed by: ORTHOPAEDIC SURGERY

## 2019-01-30 PROCEDURE — 25010000002 PHENYLEPHRINE PER 1 ML: Performed by: NURSE ANESTHETIST, CERTIFIED REGISTERED

## 2019-01-30 PROCEDURE — 25010000002 DEXAMETHASONE PER 1 MG: Performed by: NURSE ANESTHETIST, CERTIFIED REGISTERED

## 2019-01-30 PROCEDURE — 96374 THER/PROPH/DIAG INJ IV PUSH: CPT

## 2019-01-30 PROCEDURE — 25010000002 PROMETHAZINE PER 50 MG: Performed by: NURSE PRACTITIONER

## 2019-01-30 PROCEDURE — 25010000002 MORPHINE PER 10 MG: Performed by: ORTHOPAEDIC SURGERY

## 2019-01-30 PROCEDURE — 99284 EMERGENCY DEPT VISIT MOD MDM: CPT

## 2019-01-30 PROCEDURE — 25010000002 ONDANSETRON PER 1 MG: Performed by: ORTHOPAEDIC SURGERY

## 2019-01-30 PROCEDURE — 25010000002 ROPIVACAINE PER 1 MG: Performed by: ORTHOPAEDIC SURGERY

## 2019-01-30 PROCEDURE — 85025 COMPLETE CBC W/AUTO DIFF WBC: CPT | Performed by: EMERGENCY MEDICINE

## 2019-01-30 PROCEDURE — 63710000001 INSULIN LISPRO (HUMAN) PER 5 UNITS: Performed by: NURSE PRACTITIONER

## 2019-01-30 PROCEDURE — 25010000002 MORPHINE PER 10 MG: Performed by: EMERGENCY MEDICINE

## 2019-01-30 PROCEDURE — 97162 PT EVAL MOD COMPLEX 30 MIN: CPT

## 2019-01-30 PROCEDURE — 96375 TX/PRO/DX INJ NEW DRUG ADDON: CPT

## 2019-01-30 PROCEDURE — 25010000002 ONDANSETRON PER 1 MG: Performed by: EMERGENCY MEDICINE

## 2019-01-30 DEVICE — IMPLANTABLE DEVICE: Type: IMPLANTABLE DEVICE | Site: HIP | Status: FUNCTIONAL

## 2019-01-30 DEVICE — INSRT TRIDENT X3 0D 36MM SZD: Type: IMPLANTABLE DEVICE | Site: HIP | Status: FUNCTIONAL

## 2019-01-30 DEVICE — SCRW HEX LP TRIDENT2 6.5X40MM: Type: IMPLANTABLE DEVICE | Site: HIP | Status: FUNCTIONAL

## 2019-01-30 DEVICE — SCRW HEX LP TRIDENT2 6.5X30MM: Type: IMPLANTABLE DEVICE | Site: HIP | Status: FUNCTIONAL

## 2019-01-30 DEVICE — TOTL HIP HI DEMAND STRYKER: Type: IMPLANTABLE DEVICE | Site: HIP | Status: FUNCTIONAL

## 2019-01-30 DEVICE — SHLL TRIDENT2 TRITANIUM C/HL 50MM: Type: IMPLANTABLE DEVICE | Site: HIP | Status: FUNCTIONAL

## 2019-01-30 DEVICE — STEM HIP ACCOLADE2 V40 132D 35X105MM SZ4: Type: IMPLANTABLE DEVICE | Site: HIP | Status: FUNCTIONAL

## 2019-01-30 RX ORDER — ONDANSETRON 4 MG/1
4 TABLET, FILM COATED ORAL EVERY 6 HOURS PRN
Status: DISCONTINUED | OUTPATIENT
Start: 2019-01-30 | End: 2019-01-30

## 2019-01-30 RX ORDER — NICOTINE POLACRILEX 4 MG
15 LOZENGE BUCCAL
Status: DISCONTINUED | OUTPATIENT
Start: 2019-01-30 | End: 2019-01-31 | Stop reason: HOSPADM

## 2019-01-30 RX ORDER — SODIUM CHLORIDE 0.9 % (FLUSH) 0.9 %
3-10 SYRINGE (ML) INJECTION AS NEEDED
Status: DISCONTINUED | OUTPATIENT
Start: 2019-01-30 | End: 2019-01-30 | Stop reason: HOSPADM

## 2019-01-30 RX ORDER — ACETAMINOPHEN 160 MG/5ML
650 SOLUTION ORAL EVERY 4 HOURS PRN
Status: DISCONTINUED | OUTPATIENT
Start: 2019-01-30 | End: 2019-01-31 | Stop reason: HOSPADM

## 2019-01-30 RX ORDER — LEVOTHYROXINE SODIUM 0.03 MG/1
25 TABLET ORAL
Status: DISCONTINUED | OUTPATIENT
Start: 2019-01-31 | End: 2019-01-31 | Stop reason: HOSPADM

## 2019-01-30 RX ORDER — PAROXETINE HYDROCHLORIDE 20 MG/1
20 TABLET, FILM COATED ORAL NIGHTLY
Status: DISCONTINUED | OUTPATIENT
Start: 2019-01-30 | End: 2019-01-31 | Stop reason: HOSPADM

## 2019-01-30 RX ORDER — NALOXONE HCL 0.4 MG/ML
0.1 VIAL (ML) INJECTION
Status: DISCONTINUED | OUTPATIENT
Start: 2019-01-30 | End: 2019-01-31 | Stop reason: HOSPADM

## 2019-01-30 RX ORDER — EPHEDRINE SULFATE 50 MG/ML
5 INJECTION, SOLUTION INTRAVENOUS ONCE AS NEEDED
Status: DISCONTINUED | OUTPATIENT
Start: 2019-01-30 | End: 2019-01-30 | Stop reason: HOSPADM

## 2019-01-30 RX ORDER — LABETALOL HYDROCHLORIDE 5 MG/ML
5 INJECTION, SOLUTION INTRAVENOUS
Status: DISCONTINUED | OUTPATIENT
Start: 2019-01-30 | End: 2019-01-30 | Stop reason: HOSPADM

## 2019-01-30 RX ORDER — NEOSTIGMINE METHYLSULFATE 1 MG/ML
INJECTION, SOLUTION INTRAVENOUS AS NEEDED
Status: DISCONTINUED | OUTPATIENT
Start: 2019-01-30 | End: 2019-01-30 | Stop reason: SURG

## 2019-01-30 RX ORDER — ROSUVASTATIN CALCIUM 20 MG/1
20 TABLET, COATED ORAL NIGHTLY
Status: DISCONTINUED | OUTPATIENT
Start: 2019-01-30 | End: 2019-01-31 | Stop reason: HOSPADM

## 2019-01-30 RX ORDER — DEXTROSE MONOHYDRATE 25 G/50ML
25 INJECTION, SOLUTION INTRAVENOUS
Status: DISCONTINUED | OUTPATIENT
Start: 2019-01-30 | End: 2019-01-31 | Stop reason: HOSPADM

## 2019-01-30 RX ORDER — NALOXONE HCL 0.4 MG/ML
0.4 VIAL (ML) INJECTION AS NEEDED
Status: DISCONTINUED | OUTPATIENT
Start: 2019-01-30 | End: 2019-01-30 | Stop reason: HOSPADM

## 2019-01-30 RX ORDER — PROMETHAZINE HYDROCHLORIDE 25 MG/ML
6.25 INJECTION, SOLUTION INTRAMUSCULAR; INTRAVENOUS EVERY 6 HOURS PRN
Status: DISCONTINUED | OUTPATIENT
Start: 2019-01-30 | End: 2019-01-31 | Stop reason: HOSPADM

## 2019-01-30 RX ORDER — AMLODIPINE BESYLATE 5 MG/1
5 TABLET ORAL DAILY
Status: DISCONTINUED | OUTPATIENT
Start: 2019-01-31 | End: 2019-01-31 | Stop reason: HOSPADM

## 2019-01-30 RX ORDER — MECLIZINE HYDROCHLORIDE 25 MG/1
25 TABLET ORAL 3 TIMES DAILY PRN
Status: DISCONTINUED | OUTPATIENT
Start: 2019-01-30 | End: 2019-01-31 | Stop reason: HOSPADM

## 2019-01-30 RX ORDER — ONDANSETRON 2 MG/ML
4 INJECTION INTRAMUSCULAR; INTRAVENOUS ONCE AS NEEDED
Status: COMPLETED | OUTPATIENT
Start: 2019-01-30 | End: 2019-01-30

## 2019-01-30 RX ORDER — CEFAZOLIN SODIUM 2 G/100ML
2 INJECTION, SOLUTION INTRAVENOUS ONCE
Status: COMPLETED | OUTPATIENT
Start: 2019-01-30 | End: 2019-01-30

## 2019-01-30 RX ORDER — APIXABAN 5 MG/1
TABLET, FILM COATED ORAL
Qty: 60 TABLET | Refills: 6 | OUTPATIENT
Start: 2019-01-30 | End: 2019-01-31 | Stop reason: HOSPADM

## 2019-01-30 RX ORDER — BISACODYL 5 MG/1
10 TABLET, DELAYED RELEASE ORAL DAILY PRN
Status: DISCONTINUED | OUTPATIENT
Start: 2019-01-30 | End: 2019-01-31 | Stop reason: HOSPADM

## 2019-01-30 RX ORDER — DOFETILIDE 0.25 MG/1
250 CAPSULE ORAL 2 TIMES DAILY
Status: DISCONTINUED | OUTPATIENT
Start: 2019-01-30 | End: 2019-01-31 | Stop reason: HOSPADM

## 2019-01-30 RX ORDER — MORPHINE SULFATE 2 MG/ML
2 INJECTION, SOLUTION INTRAMUSCULAR; INTRAVENOUS
Status: DISCONTINUED | OUTPATIENT
Start: 2019-01-30 | End: 2019-01-30 | Stop reason: HOSPADM

## 2019-01-30 RX ORDER — OXYCODONE HCL 10 MG/1
10 TABLET, FILM COATED, EXTENDED RELEASE ORAL ONCE
Status: COMPLETED | OUTPATIENT
Start: 2019-01-30 | End: 2019-01-30

## 2019-01-30 RX ORDER — PROPOFOL 10 MG/ML
VIAL (ML) INTRAVENOUS AS NEEDED
Status: DISCONTINUED | OUTPATIENT
Start: 2019-01-30 | End: 2019-01-30 | Stop reason: SURG

## 2019-01-30 RX ORDER — LABETALOL HYDROCHLORIDE 5 MG/ML
INJECTION, SOLUTION INTRAVENOUS AS NEEDED
Status: DISCONTINUED | OUTPATIENT
Start: 2019-01-30 | End: 2019-01-30 | Stop reason: SURG

## 2019-01-30 RX ORDER — MAGNESIUM HYDROXIDE 1200 MG/15ML
LIQUID ORAL AS NEEDED
Status: DISCONTINUED | OUTPATIENT
Start: 2019-01-30 | End: 2019-01-30 | Stop reason: HOSPADM

## 2019-01-30 RX ORDER — AMLODIPINE BESYLATE 5 MG/1
5 TABLET ORAL EVERY MORNING
COMMUNITY
End: 2019-11-20

## 2019-01-30 RX ORDER — SODIUM CHLORIDE 9 MG/ML
100 INJECTION, SOLUTION INTRAVENOUS CONTINUOUS
Status: DISCONTINUED | OUTPATIENT
Start: 2019-01-30 | End: 2019-01-31 | Stop reason: HOSPADM

## 2019-01-30 RX ORDER — ACETAMINOPHEN 500 MG
1000 TABLET ORAL ONCE
Status: COMPLETED | OUTPATIENT
Start: 2019-01-30 | End: 2019-01-30

## 2019-01-30 RX ORDER — ACETAMINOPHEN 650 MG/1
650 SUPPOSITORY RECTAL EVERY 4 HOURS PRN
Status: DISCONTINUED | OUTPATIENT
Start: 2019-01-30 | End: 2019-01-31 | Stop reason: HOSPADM

## 2019-01-30 RX ORDER — ONDANSETRON 2 MG/ML
INJECTION INTRAMUSCULAR; INTRAVENOUS AS NEEDED
Status: DISCONTINUED | OUTPATIENT
Start: 2019-01-30 | End: 2019-01-30 | Stop reason: SURG

## 2019-01-30 RX ORDER — DEXAMETHASONE SODIUM PHOSPHATE 4 MG/ML
INJECTION, SOLUTION INTRA-ARTICULAR; INTRALESIONAL; INTRAMUSCULAR; INTRAVENOUS; SOFT TISSUE AS NEEDED
Status: DISCONTINUED | OUTPATIENT
Start: 2019-01-30 | End: 2019-01-30 | Stop reason: SURG

## 2019-01-30 RX ORDER — MEPERIDINE HYDROCHLORIDE 25 MG/ML
12.5 INJECTION INTRAMUSCULAR; INTRAVENOUS; SUBCUTANEOUS
Status: DISCONTINUED | OUTPATIENT
Start: 2019-01-30 | End: 2019-01-30 | Stop reason: HOSPADM

## 2019-01-30 RX ORDER — LIDOCAINE HYDROCHLORIDE 10 MG/ML
0.5 INJECTION, SOLUTION EPIDURAL; INFILTRATION; INTRACAUDAL; PERINEURAL ONCE AS NEEDED
Status: COMPLETED | OUTPATIENT
Start: 2019-01-30 | End: 2019-01-30

## 2019-01-30 RX ORDER — ROCURONIUM BROMIDE 10 MG/ML
INJECTION, SOLUTION INTRAVENOUS AS NEEDED
Status: DISCONTINUED | OUTPATIENT
Start: 2019-01-30 | End: 2019-01-30 | Stop reason: SURG

## 2019-01-30 RX ORDER — LIDOCAINE HYDROCHLORIDE 10 MG/ML
INJECTION, SOLUTION EPIDURAL; INFILTRATION; INTRACAUDAL; PERINEURAL AS NEEDED
Status: DISCONTINUED | OUTPATIENT
Start: 2019-01-30 | End: 2019-01-30 | Stop reason: SURG

## 2019-01-30 RX ORDER — LOSARTAN POTASSIUM 50 MG/1
100 TABLET ORAL DAILY
Status: DISCONTINUED | OUTPATIENT
Start: 2019-01-31 | End: 2019-01-31 | Stop reason: HOSPADM

## 2019-01-30 RX ORDER — FAMOTIDINE 20 MG/1
20 TABLET, FILM COATED ORAL ONCE
Status: COMPLETED | OUTPATIENT
Start: 2019-01-30 | End: 2019-01-30

## 2019-01-30 RX ORDER — GLYCOPYRROLATE 0.2 MG/ML
INJECTION INTRAMUSCULAR; INTRAVENOUS AS NEEDED
Status: DISCONTINUED | OUTPATIENT
Start: 2019-01-30 | End: 2019-01-30 | Stop reason: SURG

## 2019-01-30 RX ORDER — LABETALOL HYDROCHLORIDE 5 MG/ML
10 INJECTION, SOLUTION INTRAVENOUS EVERY 4 HOURS PRN
Status: DISCONTINUED | OUTPATIENT
Start: 2019-01-30 | End: 2019-01-31 | Stop reason: HOSPADM

## 2019-01-30 RX ORDER — ONDANSETRON 2 MG/ML
4 INJECTION INTRAMUSCULAR; INTRAVENOUS ONCE AS NEEDED
Status: DISCONTINUED | OUTPATIENT
Start: 2019-01-30 | End: 2019-01-30 | Stop reason: HOSPADM

## 2019-01-30 RX ORDER — DOCUSATE SODIUM 100 MG/1
100 CAPSULE, LIQUID FILLED ORAL 2 TIMES DAILY PRN
Status: DISCONTINUED | OUTPATIENT
Start: 2019-01-30 | End: 2019-01-31 | Stop reason: HOSPADM

## 2019-01-30 RX ORDER — METOPROLOL TARTRATE 50 MG/1
50 TABLET, FILM COATED ORAL 2 TIMES DAILY
Status: DISCONTINUED | OUTPATIENT
Start: 2019-01-30 | End: 2019-01-31 | Stop reason: HOSPADM

## 2019-01-30 RX ORDER — CEFAZOLIN SODIUM 2 G/100ML
2 INJECTION, SOLUTION INTRAVENOUS EVERY 8 HOURS
Status: COMPLETED | OUTPATIENT
Start: 2019-01-30 | End: 2019-01-31

## 2019-01-30 RX ORDER — LABETALOL HYDROCHLORIDE 5 MG/ML
10 INJECTION, SOLUTION INTRAVENOUS ONCE
Status: COMPLETED | OUTPATIENT
Start: 2019-01-30 | End: 2019-01-30

## 2019-01-30 RX ORDER — ONDANSETRON 2 MG/ML
4 INJECTION INTRAMUSCULAR; INTRAVENOUS EVERY 6 HOURS PRN
Status: DISCONTINUED | OUTPATIENT
Start: 2019-01-30 | End: 2019-01-30

## 2019-01-30 RX ORDER — SODIUM CHLORIDE 0.9 % (FLUSH) 0.9 %
10 SYRINGE (ML) INJECTION AS NEEDED
Status: DISCONTINUED | OUTPATIENT
Start: 2019-01-30 | End: 2019-01-30 | Stop reason: HOSPADM

## 2019-01-30 RX ORDER — KETOROLAC TROMETHAMINE 15 MG/ML
15 INJECTION, SOLUTION INTRAMUSCULAR; INTRAVENOUS ONCE
Status: COMPLETED | OUTPATIENT
Start: 2019-01-30 | End: 2019-01-30

## 2019-01-30 RX ORDER — BISACODYL 10 MG
10 SUPPOSITORY, RECTAL RECTAL DAILY PRN
Status: DISCONTINUED | OUTPATIENT
Start: 2019-01-30 | End: 2019-01-31 | Stop reason: HOSPADM

## 2019-01-30 RX ORDER — FENTANYL CITRATE 50 UG/ML
INJECTION, SOLUTION INTRAMUSCULAR; INTRAVENOUS AS NEEDED
Status: DISCONTINUED | OUTPATIENT
Start: 2019-01-30 | End: 2019-01-30 | Stop reason: SURG

## 2019-01-30 RX ORDER — FENTANYL CITRATE 50 UG/ML
50 INJECTION, SOLUTION INTRAMUSCULAR; INTRAVENOUS
Status: DISCONTINUED | OUTPATIENT
Start: 2019-01-30 | End: 2019-01-30 | Stop reason: HOSPADM

## 2019-01-30 RX ORDER — HYDROMORPHONE HYDROCHLORIDE 1 MG/ML
0.5 INJECTION, SOLUTION INTRAMUSCULAR; INTRAVENOUS; SUBCUTANEOUS
Status: DISCONTINUED | OUTPATIENT
Start: 2019-01-30 | End: 2019-01-31 | Stop reason: HOSPADM

## 2019-01-30 RX ORDER — ACETAMINOPHEN 325 MG/1
650 TABLET ORAL EVERY 4 HOURS PRN
Status: DISCONTINUED | OUTPATIENT
Start: 2019-01-30 | End: 2019-01-31 | Stop reason: HOSPADM

## 2019-01-30 RX ORDER — DIPHENHYDRAMINE HYDROCHLORIDE 50 MG/ML
25 INJECTION INTRAMUSCULAR; INTRAVENOUS ONCE
Status: COMPLETED | OUTPATIENT
Start: 2019-01-30 | End: 2019-01-30

## 2019-01-30 RX ORDER — SODIUM CHLORIDE, SODIUM LACTATE, POTASSIUM CHLORIDE, CALCIUM CHLORIDE 600; 310; 30; 20 MG/100ML; MG/100ML; MG/100ML; MG/100ML
100 INJECTION, SOLUTION INTRAVENOUS CONTINUOUS
Status: DISCONTINUED | OUTPATIENT
Start: 2019-01-30 | End: 2019-01-31 | Stop reason: HOSPADM

## 2019-01-30 RX ORDER — SODIUM CHLORIDE, SODIUM LACTATE, POTASSIUM CHLORIDE, CALCIUM CHLORIDE 600; 310; 30; 20 MG/100ML; MG/100ML; MG/100ML; MG/100ML
9 INJECTION, SOLUTION INTRAVENOUS CONTINUOUS
Status: DISCONTINUED | OUTPATIENT
Start: 2019-01-30 | End: 2019-01-31 | Stop reason: HOSPADM

## 2019-01-30 RX ADMIN — LABETALOL HYDROCHLORIDE 10 MG: 5 INJECTION, SOLUTION INTRAVENOUS at 07:57

## 2019-01-30 RX ADMIN — ROCURONIUM BROMIDE 50 MG: 10 SOLUTION INTRAVENOUS at 07:25

## 2019-01-30 RX ADMIN — LIDOCAINE HYDROCHLORIDE 0.2 ML: 10 INJECTION, SOLUTION EPIDURAL; INFILTRATION; INTRACAUDAL; PERINEURAL at 06:15

## 2019-01-30 RX ADMIN — ONDANSETRON 4 MG: 2 INJECTION INTRAMUSCULAR; INTRAVENOUS at 14:30

## 2019-01-30 RX ADMIN — ACETAMINOPHEN 1000 MG: 500 TABLET ORAL at 06:37

## 2019-01-30 RX ADMIN — PROPOFOL 150 MG: 10 INJECTION, EMULSION INTRAVENOUS at 07:25

## 2019-01-30 RX ADMIN — FAMOTIDINE 20 MG: 20 TABLET, FILM COATED ORAL at 06:37

## 2019-01-30 RX ADMIN — PHENYLEPHRINE HYDROCHLORIDE 80 MCG: 10 INJECTION INTRAVENOUS at 08:55

## 2019-01-30 RX ADMIN — LABETALOL 20 MG/4 ML (5 MG/ML) INTRAVENOUS SYRINGE 10 MG: at 03:36

## 2019-01-30 RX ADMIN — SODIUM CHLORIDE, POTASSIUM CHLORIDE, SODIUM LACTATE AND CALCIUM CHLORIDE: 600; 310; 30; 20 INJECTION, SOLUTION INTRAVENOUS at 08:55

## 2019-01-30 RX ADMIN — ONDANSETRON 4 MG: 2 INJECTION INTRAMUSCULAR; INTRAVENOUS at 08:40

## 2019-01-30 RX ADMIN — LIDOCAINE HYDROCHLORIDE 50 MG: 10 INJECTION, SOLUTION EPIDURAL; INFILTRATION; INTRACAUDAL; PERINEURAL at 07:25

## 2019-01-30 RX ADMIN — OXYCODONE HYDROCHLORIDE 10 MG: 10 TABLET, FILM COATED, EXTENDED RELEASE ORAL at 06:36

## 2019-01-30 RX ADMIN — LABETALOL HYDROCHLORIDE 10 MG: 5 INJECTION, SOLUTION INTRAVENOUS at 08:21

## 2019-01-30 RX ADMIN — KETOROLAC TROMETHAMINE 15 MG: 15 INJECTION, SOLUTION INTRAMUSCULAR; INTRAVENOUS at 03:26

## 2019-01-30 RX ADMIN — PROCHLORPERAZINE EDISYLATE 10 MG: 5 INJECTION INTRAMUSCULAR; INTRAVENOUS at 03:30

## 2019-01-30 RX ADMIN — IOPAMIDOL 70 ML: 755 INJECTION, SOLUTION INTRAVENOUS at 02:45

## 2019-01-30 RX ADMIN — PHENYLEPHRINE HYDROCHLORIDE 80 MCG: 10 INJECTION INTRAVENOUS at 07:38

## 2019-01-30 RX ADMIN — ONDANSETRON 4 MG: 2 INJECTION INTRAMUSCULAR; INTRAVENOUS at 02:30

## 2019-01-30 RX ADMIN — INSULIN LISPRO 2 UNITS: 100 INJECTION, SOLUTION INTRAVENOUS; SUBCUTANEOUS at 18:20

## 2019-01-30 RX ADMIN — DEXAMETHASONE SODIUM PHOSPHATE 8 MG: 4 INJECTION, SOLUTION INTRAMUSCULAR; INTRAVENOUS at 07:25

## 2019-01-30 RX ADMIN — CEFAZOLIN SODIUM 2 G: 2 INJECTION, SOLUTION INTRAVENOUS at 07:21

## 2019-01-30 RX ADMIN — CEFAZOLIN SODIUM 2 G: 2 INJECTION, SOLUTION INTRAVENOUS at 23:43

## 2019-01-30 RX ADMIN — PHENYLEPHRINE HYDROCHLORIDE 80 MCG: 10 INJECTION INTRAVENOUS at 07:41

## 2019-01-30 RX ADMIN — NEOSTIGMINE METHYLSULFATE 3 MG: 1 INJECTION, SOLUTION INTRAVENOUS at 09:11

## 2019-01-30 RX ADMIN — PHENYLEPHRINE HYDROCHLORIDE 80 MCG: 10 INJECTION INTRAVENOUS at 09:05

## 2019-01-30 RX ADMIN — FENTANYL CITRATE 50 MCG: 50 INJECTION, SOLUTION INTRAMUSCULAR; INTRAVENOUS at 09:20

## 2019-01-30 RX ADMIN — INSULIN LISPRO 4 UNITS: 100 INJECTION, SOLUTION INTRAVENOUS; SUBCUTANEOUS at 20:37

## 2019-01-30 RX ADMIN — FENTANYL CITRATE 50 MCG: 50 INJECTION, SOLUTION INTRAMUSCULAR; INTRAVENOUS at 07:25

## 2019-01-30 RX ADMIN — METOPROLOL TARTRATE 50 MG: 50 TABLET ORAL at 20:36

## 2019-01-30 RX ADMIN — MORPHINE SULFATE 2 MG: 2 INJECTION, SOLUTION INTRAMUSCULAR; INTRAVENOUS at 02:30

## 2019-01-30 RX ADMIN — TRANEXAMIC ACID 1000 MG: 100 INJECTION, SOLUTION INTRAVENOUS at 07:28

## 2019-01-30 RX ADMIN — DOFETILIDE 250 MCG: 0.25 CAPSULE ORAL at 20:35

## 2019-01-30 RX ADMIN — DIPHENHYDRAMINE HYDROCHLORIDE 25 MG: 50 INJECTION INTRAMUSCULAR; INTRAVENOUS at 03:28

## 2019-01-30 RX ADMIN — MUPIROCIN 1 APPLICATION: 20 OINTMENT TOPICAL at 06:38

## 2019-01-30 RX ADMIN — PHENYLEPHRINE HYDROCHLORIDE 80 MCG: 10 INJECTION INTRAVENOUS at 08:36

## 2019-01-30 RX ADMIN — SODIUM CHLORIDE 100 ML/HR: 9 INJECTION, SOLUTION INTRAVENOUS at 12:56

## 2019-01-30 RX ADMIN — GLYCOPYRROLATE 0.4 MG: 0.2 INJECTION, SOLUTION INTRAMUSCULAR; INTRAVENOUS at 09:11

## 2019-01-30 RX ADMIN — PHENYLEPHRINE HYDROCHLORIDE 80 MCG: 10 INJECTION INTRAVENOUS at 08:59

## 2019-01-30 RX ADMIN — PROMETHAZINE HYDROCHLORIDE 6.25 MG: 25 INJECTION INTRAMUSCULAR; INTRAVENOUS at 16:55

## 2019-01-30 RX ADMIN — TRANEXAMIC ACID 1000 MG: 100 INJECTION, SOLUTION INTRAVENOUS at 08:37

## 2019-01-30 RX ADMIN — CEFAZOLIN SODIUM 2 G: 2 INJECTION, SOLUTION INTRAVENOUS at 18:20

## 2019-01-30 RX ADMIN — LABETALOL HYDROCHLORIDE 10 MG: 5 INJECTION, SOLUTION INTRAVENOUS at 08:01

## 2019-01-30 RX ADMIN — PHENYLEPHRINE HYDROCHLORIDE 80 MCG: 10 INJECTION INTRAVENOUS at 07:35

## 2019-01-30 RX ADMIN — SODIUM CHLORIDE, POTASSIUM CHLORIDE, SODIUM LACTATE AND CALCIUM CHLORIDE 9 ML/HR: 600; 310; 30; 20 INJECTION, SOLUTION INTRAVENOUS at 06:15

## 2019-01-30 RX ADMIN — ACETAMINOPHEN 650 MG: 325 TABLET ORAL at 22:38

## 2019-01-30 NOTE — ANESTHESIA POSTPROCEDURE EVALUATION
Patient: Kenisha Ruvalcaba    Procedure Summary     Date:  01/30/19 Room / Location:   [facility]  /  [facility]    Anesthesia Start:  0721 Anesthesia Stop:      Procedure:  LEFT TOTAL HIP ARTHROPLASTY (Left Hip) Diagnosis:       Primary osteoarthritis of left hip      (Primary osteoarthritis of left hip [M16.12])    Surgeon:  Freddie Vale MD Provider:  Shilo Alberto MD    Anesthesia Type:  general ASA Status:  3          Anesthesia Type: general  Last vitals  /71  174/75 (01/30/19 0625)   Temp 97  97.2 °F (36.2 °C) (01/30/19 0625)   Pulse 81  84 (01/30/19 0625)   Resp 18  18 (01/30/19 0625)     SpO2 97  91 % (01/30/19 0625)     Post Anesthesia Care and Evaluation    Patient location during evaluation: PACU  Patient participation: complete - patient participated  Level of consciousness: awake and alert  Pain score: 0  Pain management: adequate  Airway patency: patent  Anesthetic complications: No anesthetic complications  PONV Status: none  Cardiovascular status: hemodynamically stable and acceptable  Respiratory status: nonlabored ventilation, acceptable and nasal cannula  Hydration status: acceptable

## 2019-01-30 NOTE — ANESTHESIA PROCEDURE NOTES
Airway  Urgency: elective    Airway not difficult    General Information and Staff    Patient location during procedure: OR  CRNA: Luis Enrique Franklin CRNA    Indications and Patient Condition  Indications for airway management: airway protection    Preoxygenated: yes  MILS not maintained throughout  Mask difficulty assessment: 1 - vent by mask    Final Airway Details  Final airway type: endotracheal airway      Successful airway: ETT  Cuffed: yes   Successful intubation technique: direct laryngoscopy  Endotracheal tube insertion site: oral  Blade: Zaidi  Blade size: 2  ETT size (mm): 7.0  Cormack-Lehane Classification: grade I - full view of glottis  Placement verified by: chest auscultation and capnometry   Cuff volume (mL): 5  Measured from: lips  ETT to lips (cm): 20  Number of attempts at approach: 1    Additional Comments  Negative epigastric sounds, Breath sound equal bilaterally with symmetric chest rise and fall

## 2019-01-30 NOTE — ANESTHESIA PREPROCEDURE EVALUATION
Anesthesia Evaluation     Patient summary reviewed and Nursing notes reviewed   no history of anesthetic complications:  NPO Solid Status: > 8 hours  NPO Liquid Status: > 8 hours           Airway   Mallampati: II  TM distance: >3 FB  Neck ROM: full  No difficulty expected  Dental - normal exam     Pulmonary - negative pulmonary ROS and normal exam    breath sounds clear to auscultation  Cardiovascular - normal exam    ECG reviewed  Rhythm: regular  Rate: normal    (+) pacemaker (Biotronik) pacemaker interrogated 1-3 months ago, hypertension, CAD (Mild, medical management), dysrhythmias (Off eliquis <72 hrs) Atrial Fib,     ROS comment: EF 55%    Neuro/Psych- negative ROS  GI/Hepatic/Renal/Endo    (+) obesity,   hypothyroidism,     Musculoskeletal     Abdominal    Substance History      OB/GYN          Other   (+) arthritis                     Anesthesia Plan    ASA 3     general   (Discussed spinal with patient but last dose of eliquis <72 hrs.  Will elect for GA)  intravenous induction   Anesthetic plan, all risks, benefits, and alternatives have been provided, discussed and informed consent has been obtained with: patient.    Plan discussed with CRNA.

## 2019-01-31 VITALS
RESPIRATION RATE: 17 BRPM | TEMPERATURE: 97.5 F | WEIGHT: 175.02 LBS | OXYGEN SATURATION: 93 % | HEART RATE: 93 BPM | SYSTOLIC BLOOD PRESSURE: 140 MMHG | DIASTOLIC BLOOD PRESSURE: 56 MMHG | HEIGHT: 64 IN | BODY MASS INDEX: 29.88 KG/M2

## 2019-01-31 PROBLEM — D62 ACUTE BLOOD LOSS ANEMIA: Status: ACTIVE | Noted: 2019-01-31

## 2019-01-31 PROBLEM — D72.829 LEUKOCYTOSIS: Status: ACTIVE | Noted: 2019-01-31

## 2019-01-31 PROBLEM — G89.18 ACUTE POSTOPERATIVE PAIN: Status: ACTIVE | Noted: 2019-01-31

## 2019-01-31 LAB
ANION GAP SERPL CALCULATED.3IONS-SCNC: 9 MMOL/L (ref 3–11)
BUN BLD-MCNC: 15 MG/DL (ref 9–23)
BUN/CREAT SERPL: 15.5 (ref 7–25)
CALCIUM SPEC-SCNC: 8.8 MG/DL (ref 8.7–10.4)
CHLORIDE SERPL-SCNC: 103 MMOL/L (ref 99–109)
CO2 SERPL-SCNC: 27 MMOL/L (ref 20–31)
CREAT BLD-MCNC: 0.97 MG/DL (ref 0.6–1.3)
DEPRECATED RDW RBC AUTO: 49.2 FL (ref 37–54)
ERYTHROCYTE [DISTWIDTH] IN BLOOD BY AUTOMATED COUNT: 15 % (ref 11.3–14.5)
GFR SERPL CREATININE-BSD FRML MDRD: 55 ML/MIN/1.73
GLUCOSE BLD-MCNC: 158 MG/DL (ref 70–100)
GLUCOSE BLDC GLUCOMTR-MCNC: 144 MG/DL (ref 70–130)
GLUCOSE BLDC GLUCOMTR-MCNC: 167 MG/DL (ref 70–130)
HCT VFR BLD AUTO: 33.6 % (ref 34.5–44)
HGB BLD-MCNC: 10.4 G/DL (ref 11.5–15.5)
MCH RBC QN AUTO: 27.8 PG (ref 27–31)
MCHC RBC AUTO-ENTMCNC: 31 G/DL (ref 32–36)
MCV RBC AUTO: 89.8 FL (ref 80–99)
PLATELET # BLD AUTO: 217 10*3/MM3 (ref 150–450)
PMV BLD AUTO: 9.7 FL (ref 6–12)
POTASSIUM BLD-SCNC: 3.1 MMOL/L (ref 3.5–5.5)
RBC # BLD AUTO: 3.74 10*6/MM3 (ref 3.89–5.14)
SODIUM BLD-SCNC: 139 MMOL/L (ref 132–146)
WBC NRBC COR # BLD: 13.44 10*3/MM3 (ref 3.5–10.8)

## 2019-01-31 PROCEDURE — 97530 THERAPEUTIC ACTIVITIES: CPT

## 2019-01-31 PROCEDURE — 80048 BASIC METABOLIC PNL TOTAL CA: CPT | Performed by: ORTHOPAEDIC SURGERY

## 2019-01-31 PROCEDURE — 97116 GAIT TRAINING THERAPY: CPT

## 2019-01-31 PROCEDURE — 93010 ELECTROCARDIOGRAM REPORT: CPT | Performed by: INTERNAL MEDICINE

## 2019-01-31 PROCEDURE — 97166 OT EVAL MOD COMPLEX 45 MIN: CPT

## 2019-01-31 PROCEDURE — 93005 ELECTROCARDIOGRAM TRACING: CPT | Performed by: INTERNAL MEDICINE

## 2019-01-31 PROCEDURE — 25010000002 ENOXAPARIN PER 10 MG: Performed by: ORTHOPAEDIC SURGERY

## 2019-01-31 PROCEDURE — 97110 THERAPEUTIC EXERCISES: CPT

## 2019-01-31 PROCEDURE — 85027 COMPLETE CBC AUTOMATED: CPT | Performed by: NURSE PRACTITIONER

## 2019-01-31 PROCEDURE — 82962 GLUCOSE BLOOD TEST: CPT

## 2019-01-31 PROCEDURE — 99024 POSTOP FOLLOW-UP VISIT: CPT | Performed by: ORTHOPAEDIC SURGERY

## 2019-01-31 RX ORDER — PSEUDOEPHEDRINE HCL 30 MG
100 TABLET ORAL 2 TIMES DAILY PRN
Start: 2019-01-31 | End: 2019-02-28

## 2019-01-31 RX ORDER — POTASSIUM CHLORIDE 750 MG/1
40 CAPSULE, EXTENDED RELEASE ORAL ONCE
Status: COMPLETED | OUTPATIENT
Start: 2019-01-31 | End: 2019-01-31

## 2019-01-31 RX ORDER — ACETAMINOPHEN 325 MG/1
650 TABLET ORAL EVERY 4 HOURS PRN
Start: 2019-01-31

## 2019-01-31 RX ADMIN — METOPROLOL TARTRATE 50 MG: 50 TABLET ORAL at 08:16

## 2019-01-31 RX ADMIN — AMLODIPINE BESYLATE 5 MG: 5 TABLET ORAL at 08:16

## 2019-01-31 RX ADMIN — BISACODYL 10 MG: 5 TABLET, COATED ORAL at 08:16

## 2019-01-31 RX ADMIN — ACETAMINOPHEN 650 MG: 325 TABLET ORAL at 08:17

## 2019-01-31 RX ADMIN — DOFETILIDE 250 MCG: 0.25 CAPSULE ORAL at 08:15

## 2019-01-31 RX ADMIN — INSULIN LISPRO 2 UNITS: 100 INJECTION, SOLUTION INTRAVENOUS; SUBCUTANEOUS at 13:21

## 2019-01-31 RX ADMIN — LOSARTAN POTASSIUM 100 MG: 50 TABLET ORAL at 08:16

## 2019-01-31 RX ADMIN — ENOXAPARIN SODIUM 40 MG: 40 INJECTION SUBCUTANEOUS at 08:16

## 2019-01-31 RX ADMIN — POTASSIUM CHLORIDE 40 MEQ: 750 CAPSULE, EXTENDED RELEASE ORAL at 10:58

## 2019-01-31 RX ADMIN — LEVOTHYROXINE SODIUM 25 MCG: 25 TABLET ORAL at 05:20

## 2019-02-04 ENCOUNTER — TELEPHONE (OUTPATIENT)
Dept: ORTHOPEDIC SURGERY | Facility: CLINIC | Age: 79
End: 2019-02-04

## 2019-02-04 NOTE — TELEPHONE ENCOUNTER
Tricia called back, she said one of the doctors at [facility] put in an order that said if no drainage it was ok to leave the wound open to air. She said the incision looked good, and there was no drainage coming from it. She apologized and said she wasn't sure why it was taken off and left off, she has let her supervisors know. Would you like them to re-apply that dressing?

## 2019-02-04 NOTE — TELEPHONE ENCOUNTER
The patients daughter is calling because she is concerned that [facility] is not following  PO instructions. She said that they took her bandage off last night and did not put another one on. She is very concerned and would like someone to call them. She said that her main nurse is named Maria Dolores. [place] can be reached at [phone].The patient is in bed  153.     I called [facility] and Spoke with the charge nurse Tricia, she said she doesn't know why it was removed, she had not seen the patient today. She is going to find out why it was removed and call the office back.

## 2019-02-04 NOTE — TELEPHONE ENCOUNTER
I spoke with Tricia  at [facility].  I told her to put a dressing back on the incision and it needs to remain there for 7 days PO.  I also stated that the incision needs to stay dry until we see her here in the office PO.  Tricia had no further questions.  I told her to call me if she does.  Dee

## 2019-02-07 ENCOUNTER — TELEPHONE (OUTPATIENT)
Dept: ORTHOPEDIC SURGERY | Facility: CLINIC | Age: 79
End: 2019-02-07

## 2019-02-07 NOTE — TELEPHONE ENCOUNTER
I called and left a message for Mirtha letting her know what Dr. Vale said and to call with any further questions she may have  Emy

## 2019-02-07 NOTE — TELEPHONE ENCOUNTER
SANDRA FROM [facility] IS CALLING TO LET DR. VERGARA KNOW THAT THIS PATIENT IS BEING DISCHARGED FROM THEIR INPATIENT FACILITY AND MOVING TO HOME HEALTH. THEY HAVE RECOMMENDED THAT SHE MOVE TO DOING PT AND OT. SHE WAS WANTING TO CONFIRM WITH DR. VERGARA TO MAKE SURE THAT THIS IS SOMETHING THAT HE IS OKAY WITH HER DOING. SANDRA CAN BE DIRECTLY CONTACTED REGARDING THIS [phone].

## 2019-02-11 ENCOUNTER — TELEPHONE (OUTPATIENT)
Dept: ORTHOPEDIC SURGERY | Facility: CLINIC | Age: 79
End: 2019-02-11

## 2019-02-11 NOTE — TELEPHONE ENCOUNTER
Patients Daughter returned call. Explained information to her per Dr. Vale's instructions. Patient aware that they can remove bandage however they are still on the same restrictions regarding weight bearing.  Understood and agreed.

## 2019-02-11 NOTE — TELEPHONE ENCOUNTER
PATIENTS DAUGHTER CALLED STATING THAT SHE IS LEAVING [facility] AND JUST WANTED TO KNOW IF SHE CAN REMOVE BANDAGE WHERE IT IS 10 DAYS POST-OP. SHE WANTS TO KNOW WHAT TO DO AFTER TAKING BANDAGE OFF AS WELL. PLEASE CALL BACK [phone].

## 2019-02-11 NOTE — TELEPHONE ENCOUNTER
Returned call to give instructions per Dr. Vale. Left voicemail.   Patient can remove bandage, but should continue with the same weight bearing status.

## 2019-02-12 ENCOUNTER — TELEPHONE (OUTPATIENT)
Dept: ORTHOPEDIC SURGERY | Facility: CLINIC | Age: 79
End: 2019-02-12

## 2019-02-12 NOTE — TELEPHONE ENCOUNTER
NIRAJ Godinez [phone] called from Home Health to let us know that she will be seeing the patient once a week for three weeks and then go from there.

## 2019-02-19 ENCOUNTER — OFFICE VISIT (OUTPATIENT)
Dept: ORTHOPEDIC SURGERY | Facility: CLINIC | Age: 79
End: 2019-02-19

## 2019-02-19 DIAGNOSIS — Z96.642 STATUS POST TOTAL REPLACEMENT OF LEFT HIP: Primary | ICD-10-CM

## 2019-02-19 PROCEDURE — 99024 POSTOP FOLLOW-UP VISIT: CPT | Performed by: ORTHOPAEDIC SURGERY

## 2019-02-19 RX ORDER — HYDROCODONE BITARTRATE AND ACETAMINOPHEN 5; 325 MG/1; MG/1
TABLET ORAL
COMMUNITY
Start: 2019-02-11 | End: 2019-02-28

## 2019-02-19 NOTE — PROGRESS NOTES
Orthopaedic Clinic Note:  Hip Post Op    Chief Complaint   Patient presents with   • Left Hip - Post-op     3 week f/u  Left LUDMILA 1/30/19        HPI    Ms. Ruvalcaba is 3  week(s) s/p left total hip arthroplasty. Rates pain 4/10. She is ambulating with a walker and is taking Norco and Tylenol for pain control. She denies fevers, chills, or constitutional symptoms. She is continuing home health PT. Patient is improving overall.  She denies any complications.  She is happy with her progress..    Past Medical History:   Diagnosis Date   • AF (atrial fibrillation) (CMS/HCC)    • Anesthesia complication     HARD TO WAKE   • Anxiety    • Arrhythmia     atrial fibrillation   • Arthritis     Left hip   • Carotid artery occlusion     right   • Chronic hip pain, left 1/7/2019   • Coronary artery disease    • Dyslipidemia    • History of cardioversion 12/03/2010    initiation of Tambocor and subsequent external cardioversion.   • Hyperlipidemia    • Hypertension    • Hypothyroidism    • Paroxysmal atrial fibrillation (CMS/HCC)    • SSS (sick sinus syndrome) (CMS/HCC)    • Valvular heart disease    • Vertigo    • Wears glasses       Past Surgical History:   Procedure Laterality Date   • BREAST BIOPSY Left     MARKER IN PLACE   • CARDIAC CATHETERIZATION Left 6/16/2016    Procedure: Cardiac catheterization;  Surgeon: Chuck Clark MD;  Location:  [facility] CATH INVASIVE LOCATION;  Service:    • CARDIAC ELECTROPHYSIOLOGY PROCEDURE N/A 2/17/2017    Procedure: Pacemaker DC new;  Surgeon: Darryl Swenson MD;  Location:  [facility] EP INVASIVE LOCATION;  Service:    • CAROTID ENDARTERECTOMY Left    • CATARACT EXTRACTION Bilateral    • COLONOSCOPY     • PACEMAKER IMPLANTATION     • TOTAL HIP ARTHROPLASTY Left 1/30/2019    Procedure: TOTAL HIP ARTHROPLASTY LEFT;  Surgeon: Freddie Vale MD;  Location:  [facility] OR;  Service: Orthopedics   • TUBAL ABDOMINAL LIGATION        Family History   Problem Relation Age of Onset   •  Alzheimer's disease Mother    • Cancer Mother    • Aortic aneurysm Father    • Heart valve disorder Sister      Social History     Socioeconomic History   • Marital status:      Spouse name: Not on file   • Number of children: Not on file   • Years of education: Not on file   • Highest education level: Not on file   Social Needs   • Financial resource strain: Not on file   • Food insecurity - worry: Not on file   • Food insecurity - inability: Not on file   • Transportation needs - medical: Not on file   • Transportation needs - non-medical: Not on file   Occupational History   • Occupation: retired   Tobacco Use   • Smoking status: Never Smoker   • Smokeless tobacco: Never Used   Substance and Sexual Activity   • Alcohol use: Yes     Alcohol/week: 0.6 oz     Types: 1 Glasses of wine per week     Frequency: Monthly or less     Comment: OCCASSIONAL   • Drug use: No   • Sexual activity: Defer   Other Topics Concern   • Not on file   Social History Narrative    Denies caffeine use. Lives at home with .       Current Outpatient Medications on File Prior to Visit   Medication Sig Dispense Refill   • acetaminophen (TYLENOL) 325 MG tablet Take 2 tablets by mouth Every 4 (Four) Hours As Needed for Mild Pain .     • amLODIPine (NORVASC) 5 MG tablet Take 5 mg by mouth Daily.     • apixaban (ELIQUIS) 5 MG tablet tablet Take 1 tablet by mouth Every 12 (Twelve) Hours 60 tablet 6   • aspirin 81 MG EC tablet Take 81 mg by mouth Every Night.     • cholecalciferol (VITAMIN D3) 1000 UNITS tablet Take 1,000 Units by mouth Daily.     • coenzyme Q10 100 MG capsule Take 100 mg by mouth Daily.     • docusate sodium 100 MG capsule Take 100 mg by mouth 2 (Two) Times a Day As Needed for Constipation.     • dofetilide (TIKOSYN) 250 MCG capsule Take 1 capsule by mouth Every 12 (Twelve) Hours. (Patient taking differently: Take 250 mcg by mouth 2 (Two) Times a Day.) 60 capsule 6   • HYDROcodone-acetaminophen (NORCO) 5-325 MG per  tablet      • levothyroxine (SYNTHROID, LEVOTHROID) 25 MCG tablet Take 25 mcg by mouth Every Morning.     • losartan (COZAAR) 100 MG tablet Take 1 tablet by mouth Daily. 90 tablet 1   • meclizine (ANTIVERT) 25 MG tablet Take 25 mg by mouth 3 (Three) Times a Day As Needed for dizziness.     • metoprolol tartrate (LOPRESSOR) 100 MG tablet Take 1 tablet by mouth 2 (Two) Times a Day. (Patient taking differently: Take 50 mg by mouth 2 (Two) Times a Day.) 60 tablet 11   • nitroglycerin (NITROSTAT) 0.4 MG SL tablet Place 0.4 mg under the tongue Every 5 (Five) Minutes As Needed for chest pain. Take no more than 3 doses in 15 minutes.     • PARoxetine (PAXIL) 10 MG tablet Take 20 mg by mouth Every Night.     • rosuvastatin (CRESTOR) 10 MG tablet Take 20 mg by mouth Every Night. TAKES ON WEDNESDAYS AND SATURDAYS ONLY       No current facility-administered medications on file prior to visit.       Allergies   Allergen Reactions   • Beta Adrenergic Blockers Dizziness     DIZZINESS    • Sulfa Antibiotics Rash     RASH         Review of Systems     Physical Exam  not currently breastfeeding.    There is no height or weight on file to calculate BMI.    GENERAL APPEARANCE: awake, alert, oriented, in no acute distress and well developed, well nourished  LUNGS:  breathing nonlabored  EXTREMITIES: no clubbing, cyanosis  PERIPHERAL PULSES: palpable dorsalis pedis and posterior tibial pulses bilaterally.    GAIT:  Trendelenberg            Hip Exam:  Left    RANGE OF MOTION:  EXTENSION/FLEXION:  normal (0-110 degrees)  IR:  20  ER:  30  PAIN WITH HIP MOTION:  yes laterally to trochanter bursa only  PAIN WITH LOGROLL:  no     STRENGTH:  ABDUCTOR:  4/5  ADDUCTOR:  5/5  HIP FLEXION:  4/5    GREATER TROCHANTER BURSAL PAIN:  yes    SENSATION TO LIGHT TOUCH:  DEEP PERONEAL/SUPERFICIAL PERONEAL/SURAL/SAPHENOUS/TIBIAL:   intact    EDEMA:  yes, trace edema in ankle  ERYTHEMA:  no  WOUNDS/INCISIONS:  yes, well-healed posterior lateral hip  incision  _______________________________________________________________  _______________________________________________________________    RADIOGRAPHIC FINDINGS:   Indication: Status post left total hip arthroplasty    Comparison: Todays xrays were compared to previous xrays from 1/30/2019    AP pelvis: Left: Demonstrate a well positioned total hip without evidence of wear, loosening, fracture or osteolysis, femoral head is concentrically reduced within the acetabulum and No significant changes compared to prior radiographs.        Assessment/Plan:   Diagnosis Plan   1. Status post total replacement of left hip  XR Pelvis 1 or 2 View    Ambulatory Referral to Physical Therapy Evaluate and treat     Patient is doing well 3 weeks status post left total hip arthroplasty.  We will transition outpatient physical therapy to initiate strengthening and weaning from the walker to a cane and eventually to nothing as tolerated.  I will see her back in 3 weeks with repeat x-ray AP pelvis.    Freddie Vale MD  02/19/19  10:42 AM

## 2019-02-28 ENCOUNTER — OFFICE VISIT (OUTPATIENT)
Dept: CARDIOLOGY | Facility: CLINIC | Age: 79
End: 2019-02-28

## 2019-02-28 VITALS
HEART RATE: 84 BPM | WEIGHT: 173 LBS | DIASTOLIC BLOOD PRESSURE: 64 MMHG | BODY MASS INDEX: 30.65 KG/M2 | HEIGHT: 63 IN | SYSTOLIC BLOOD PRESSURE: 130 MMHG | OXYGEN SATURATION: 97 %

## 2019-02-28 DIAGNOSIS — I10 ESSENTIAL HYPERTENSION: ICD-10-CM

## 2019-02-28 DIAGNOSIS — I25.119 CORONARY ARTERY DISEASE INVOLVING NATIVE CORONARY ARTERY OF NATIVE HEART WITH ANGINA PECTORIS (HCC): Primary | ICD-10-CM

## 2019-02-28 DIAGNOSIS — I49.5 TACHYCARDIA-BRADYCARDIA SYNDROME (HCC): ICD-10-CM

## 2019-02-28 PROCEDURE — 99214 OFFICE O/P EST MOD 30 MIN: CPT | Performed by: PHYSICIAN ASSISTANT

## 2019-02-28 PROCEDURE — 93280 PM DEVICE PROGR EVAL DUAL: CPT | Performed by: PHYSICIAN ASSISTANT

## 2019-02-28 RX ORDER — FUROSEMIDE 20 MG/1
20 TABLET ORAL DAILY
Qty: 90 TABLET | Refills: 3 | Status: SHIPPED | OUTPATIENT
Start: 2019-02-28 | End: 2019-11-13 | Stop reason: SDUPTHER

## 2019-02-28 RX ORDER — LOSARTAN POTASSIUM 100 MG/1
100 TABLET ORAL DAILY
Qty: 30 TABLET | Refills: 6 | Status: SHIPPED | OUTPATIENT
Start: 2019-02-28 | End: 2019-11-20 | Stop reason: SDUPTHER

## 2019-02-28 RX ORDER — POTASSIUM CHLORIDE 750 MG/1
10 TABLET, FILM COATED, EXTENDED RELEASE ORAL DAILY
Qty: 30 TABLET | Refills: 11 | Status: SHIPPED | OUTPATIENT
Start: 2019-02-28 | End: 2019-11-13 | Stop reason: SDUPTHER

## 2019-02-28 NOTE — PROGRESS NOTES
[facility] at [facility]   OFFICE NOTE      Kenisha Ruvalcaba  1940  PCP: Lana Flores MD    SUBJECTIVE:   Kenisha Ruvalcaba is a 78 y.o. female seen for a follow up visit regarding the following:     CC: PAF    Problem List:   1. Paroxysmal atrial fibrillation:  a. Echocardiogram, 09/10/2010: Left atrium 4.6. Moderate left atrial enlargement. Ejection fraction 55%. Severe right atrial enlargement and 2+ tricuspid regurgitation with RVSP 37 mmHg.  b. Coumadin initiation, 09/07/2010.  c. Initiation of Tambocor and subsequent external cardioversion, 12/03/2010.  d. Echocardiogram, 12/03/2010: Moderate mitral regurgitation, moderate tricuspid regurgitation, normal left ventricular size and function, negative bubble study.   e. External cardioversion to sinus rhythm, December 2010.  f. CHADS-VASc score equals 4 to 6, on Coumadin.   g. Echocardiogram 10/2016: EF >70%, grade II diastolic dysfunction, LA cavity mildly dilated, mild MR, RVSP 45-55 mmHg  h. Recurrent atrial fibrillation despite Flecainide, flecainide discontinued 09/2018  i. Tikosyn initiation 09/2018, Metoprolol increased to 100mg BID 1/19     2. Sick sinus syndrome:   a. Patient was hospitalized at [facility], 04/26/2016 through 04/28/2016 with a 30-day Event monitor.   b. Event recorder 12/16/16: 9% AF, 3.3 sec pauses, HR range from 37 bpm - 151 bpm  c. PM implant 2/17/17- BTK   3. Mild coronary artery disease on cardiac catheterization, 02/06/2013. And C on 6/6/16, normal EF  4. Carotid artery stenosis, status post left CEA:  a. Carotid ultrasound, March 2013, with no significant stenosis.  5. Valvular heart disease:  a. Echo, 01/19/2013: Left ventricular ejection fraction 55% to 60%, mild to moderate mitral regurgitation, RVSP 46 mmHg.   6. Hypertension.   7. Dyslipidemia.   8. Vertigo.   9. Anxiety.   10. Surgical history:  a. Tubal ligation.   b. Left CEA, March 2008.            HPI:   78-year-old female  presents for follow-up regarding history of atrial fibrillation, coronary disease, hypertension, diastolic dysfunction, Biotronik pacemaker.  Patient reports she just had left hip surgery.  She is still recovering.  She had some shortness of breath with exertion.  She also notes some increasing lower extremity edema.  She states this started prior to her hip surgery.  She reports her blood pressure got controlled and she had amlodipine per her primary care provider.  This did help tremendously and her blood pressure remained controlled but she continues to have lower extremity edema.  She denies orthopnea or PND.  She also reports that she feels her atrial fibrillation has been better controlled since we increased her Lopressor to 100 mg twice daily.  She denies any dizziness, near-syncope or syncope.        ROS:  Review of Symptoms:  General: no recent weight loss/gain, weakness or fatigue  Skin: no rashes, lumps, or other skin changes  HEENT: no dizziness, lightheadedness, or vision changes  Respiratory: no cough or hemoptysis  Cardiovascular: + palpitations, and tachycardia  Gastrointestinal: no black/tarry stools or diarrhea  Urinary: no change in frequency or urgency  Peripheral Vascular: no claudication or leg cramps  Musculoskeletal: no muscle or joint pain/stiffness. + edema.   Psychiatric: no depression or excessive stress  Neurological: no sensory or motor loss, no syncope  Hematologic: no anemia, easy bruising or bleeding  Endocrine: On Synthroid    Cardiac PMH: (Old records have been reviewed and summarized below)      Past Medical History, Past Surgical History, Family history, Social History, and Medications were all reviewed with the patient today and updated as necessary.       Current Outpatient Medications:   •  acetaminophen (TYLENOL) 325 MG tablet, Take 2 tablets by mouth Every 4 (Four) Hours As Needed for Mild Pain ., Disp: , Rfl:   •  amLODIPine (NORVASC) 5 MG tablet, Take 5 mg by mouth Daily.,  Disp: , Rfl:   •  apixaban (ELIQUIS) 5 MG tablet tablet, Take 1 tablet by mouth Every 12 (Twelve) Hours, Disp: 60 tablet, Rfl: 6  •  aspirin 81 MG EC tablet, Take 81 mg by mouth Every Night., Disp: , Rfl:   •  cholecalciferol (VITAMIN D3) 1000 UNITS tablet, Take 1,000 Units by mouth Daily., Disp: , Rfl:   •  coenzyme Q10 100 MG capsule, Take 100 mg by mouth Daily., Disp: , Rfl:   •  dofetilide (TIKOSYN) 250 MCG capsule, Take 1 capsule by mouth Every 12 (Twelve) Hours. (Patient taking differently: Take 250 mcg by mouth 2 (Two) Times a Day.), Disp: 60 capsule, Rfl: 6  •  levothyroxine (SYNTHROID, LEVOTHROID) 25 MCG tablet, Take 25 mcg by mouth Every Morning., Disp: , Rfl:   •  losartan (COZAAR) 100 MG tablet, Take 1 tablet by mouth Daily., Disp: 90 tablet, Rfl: 1  •  meclizine (ANTIVERT) 25 MG tablet, Take 25 mg by mouth 3 (Three) Times a Day As Needed for dizziness., Disp: , Rfl:   •  metoprolol tartrate (LOPRESSOR) 100 MG tablet, Take 1 tablet by mouth 2 (Two) Times a Day., Disp: 60 tablet, Rfl: 11  •  nitroglycerin (NITROSTAT) 0.4 MG SL tablet, Place 0.4 mg under the tongue Every 5 (Five) Minutes As Needed for chest pain. Take no more than 3 doses in 15 minutes., Disp: , Rfl:   •  PARoxetine (PAXIL) 10 MG tablet, Take 10 mg by mouth Every Night., Disp: , Rfl:   •  rosuvastatin (CRESTOR) 10 MG tablet, Take 20 mg by mouth Every Night. TAKES ON WEDNESDAYS AND SATURDAYS ONLY, Disp: , Rfl:       Allergies   Allergen Reactions   • Beta Adrenergic Blockers Dizziness     DIZZINESS    • Sulfa Antibiotics Rash     RASH      Patient Active Problem List   Diagnosis   • Paroxysmal atrial fibrillation (CMS/HCC)   • Abnormal stress test   • Essential hypertension   • Hyperlipidemia LDL goal <70   • Coronary artery disease involving native coronary artery of native heart with angina pectoris (CMS/HCC)   • Left-sided carotid artery disease (CMS/HCC)   • Vertigo   • Anxiety   • Mitral regurgitation   • Coronary artery disease   •  H/O echocardiogram   • History of cardioversion   • SSS (sick sinus syndrome) (CMS/HCC)   • History of Doppler ultrasound   • Dyslipidemia   • Tachycardia-bradycardia syndrome (CMS/HCC)   • Chronic hip pain, left   • Primary osteoarthritis of left hip   • Status post total replacement of left hip   • Hypothyroid   • Hypokalemia, replaced   • Prediabetes   • Leukocytosis, mild, likely reactive   • Acute blood loss anemia, mild, asymptomatic   • Acute postoperative pain     Past Medical History:   Diagnosis Date   • AF (atrial fibrillation) (CMS/HCC)    • Anesthesia complication     HARD TO WAKE   • Anxiety    • Arrhythmia     atrial fibrillation   • Arthritis     Left hip   • Carotid artery occlusion     right   • Chronic hip pain, left 1/7/2019   • Coronary artery disease    • Dyslipidemia    • History of cardioversion 12/03/2010    initiation of Tambocor and subsequent external cardioversion.   • Hyperlipidemia    • Hypertension    • Hypothyroidism    • Paroxysmal atrial fibrillation (CMS/HCC)    • SSS (sick sinus syndrome) (CMS/HCC)    • Valvular heart disease    • Vertigo    • Wears glasses      Past Surgical History:   Procedure Laterality Date   • BREAST BIOPSY Left     MARKER IN PLACE   • CARDIAC CATHETERIZATION Left 6/16/2016    Procedure: Cardiac catheterization;  Surgeon: Chuck Clark MD;  Location:  [facility] CATH INVASIVE LOCATION;  Service:    • CARDIAC ELECTROPHYSIOLOGY PROCEDURE N/A 2/17/2017    Procedure: Pacemaker DC new;  Surgeon: Darryl Swenson MD;  Location:  [facility] EP INVASIVE LOCATION;  Service:    • CAROTID ENDARTERECTOMY Left    • CATARACT EXTRACTION Bilateral    • COLONOSCOPY     • PACEMAKER IMPLANTATION     • TOTAL HIP ARTHROPLASTY Left 1/30/2019    Procedure: TOTAL HIP ARTHROPLASTY LEFT;  Surgeon: Freddie Vale MD;  Location:  [facility] OR;  Service: Orthopedics   • TUBAL ABDOMINAL LIGATION       Family History   Problem Relation Age of Onset   • Alzheimer's disease Mother    •  "Cancer Mother    • Aortic aneurysm Father    • Heart valve disorder Sister      Social History     Tobacco Use   • Smoking status: Never Smoker   • Smokeless tobacco: Never Used   Substance Use Topics   • Alcohol use: Yes     Alcohol/week: 0.6 oz     Types: 1 Glasses of wine per week     Frequency: Monthly or less     Comment: OCCASSIONAL         PHYSICAL EXAM:    /64 (BP Location: Left arm, Patient Position: Sitting)   Pulse 84   Ht 160 cm (63\")   Wt 78.5 kg (173 lb)   LMP  (LMP Unknown)   SpO2 97%   BMI 30.65 kg/m²        Wt Readings from Last 5 Encounters:   02/28/19 78.5 kg (173 lb)   01/30/19 79.4 kg (175 lb 0.4 oz)   01/30/19 79.4 kg (175 lb)   01/17/19 84.9 kg (187 lb 4 oz)   01/15/19 85.2 kg (187 lb 12.8 oz)       BP Readings from Last 5 Encounters:   02/28/19 130/64   01/31/19 140/56   01/30/19 149/62   01/07/19 (!) 198/97   11/29/18 124/78       General appearance - Alert, well appearing, and in no distress   Mental status - Affect appropriate to mood.  Eyes - Sclerae anicteric,  ENMT - Hearing grossly normal bilaterally, Dental hygiene good.  Neck - Carotids upstroke normal bilaterally, no bruits, no JVD.  Resp - Clear to auscultation, no wheezes, rales or rhonchi, symmetric air entry.  Heart - Normal rate, regular rhythm, normal S1, S2, no murmurs, rubs, clicks or gallops.  GI - Soft, nontender, nondistended, no masses or organomegaly.  Neurological - Grossly intact - normal speech, no focal findings  Musculoskeletal - No joint tenderness, deformity or swelling, no muscular tenderness noted.  Extremities - Peripheral pulses normal, One plus pedal edema, no clubbing or cyanosis.  Skin - Normal coloration and turgor.  Psych -  oriented to person, place, and time.    Medical problems and test results were reviewed with the patient today.     Recent Results (from the past 672 hour(s))   POC Glucose Once    Collection Time: 01/31/19 11:54 AM   Result Value Ref Range    Glucose 167 (H) 70 - 130 " mg/dL     EKG: (EKG has been independently visualized by me and summarized below)    ECG 12 Lead  Date/Time: 2/28/2019 1:29 PM  Performed by: Elmer Hollis PA  Authorized by: Elmer Hollis PA   Comparison: compared with previous ECG from 2/5/2019  Rhythm: sinus rhythm and paced  Rate: normal  Conduction: conduction normal  QRS axis: normal  Other: no other findings    Clinical impression: normal ECG          Device Interrogation:  Dual-chamber Biotronik pacemaker.  A paced 30%.  RV paced 60%.  P wave 1.7 mV.  R wave 9.0 mV.  Atrial threshold 1.1 V at 0.2 ms.  RV threshold 0.8 V at 0.2 mg.  Atrial impedance 3 and 12 ohms.  RV impedance 6 and 24 ohms.  Battery life 8 years remaining.  34% mode switch.      ASSESSMENT   1. PAF: Tikosyn. Afib burden increased, Lopressor  titrated to 100 mg BID 1/19  2. HTN: Controlled after Norvasc added.    3. Anticoagulation: Chronic Eliquis  4. SSS/Biotronik pacemaker 2/17: Stable. Normal function.    5. Lower extremity edema, diastolic dysfunction:  Add low dose lasix 20mg daily with potassium 10meq daily.     PLAN  · Continue current medical therapy, except add low dose lasix with potassium  for recent increase in edema.  · BMP in five days  · We discussed that her A. fib burden has reduced to 34%.  She feels better on the higher dose of Lopressor.  She would like to continue current medical regimen if she again has an increase in breakthrough episodes atrial fibrillation like  she would like to consider the pulmonary vein ablation procedure.  · Follow up with Dr. Swenson in 6 months.     2/28/2019  11:18 AM    Will Telma GRAVES

## 2019-03-14 ENCOUNTER — OFFICE VISIT (OUTPATIENT)
Dept: ORTHOPEDIC SURGERY | Facility: CLINIC | Age: 79
End: 2019-03-14

## 2019-03-14 DIAGNOSIS — Z96.642 STATUS POST TOTAL HIP REPLACEMENT, LEFT: Primary | ICD-10-CM

## 2019-03-14 PROCEDURE — 99024 POSTOP FOLLOW-UP VISIT: CPT | Performed by: ORTHOPAEDIC SURGERY

## 2019-03-14 NOTE — PROGRESS NOTES
Orthopaedic Clinic Note:  Hip Post Op    Chief Complaint   Patient presents with   • Left Hip - Follow-up, Post-op     3 week follow up; Status post total hip arthroplasty 1/30/19        HPI    Ms. Ruvalcaba is 6  week(s) s/p left total hip arthroplasty. Rates pain 0/10. She is ambulating with a cane and is taking nothing for pain control. She denies fevers, chills, or constitutional symptoms. She is continuing outpatient PT. Patient is improving overall.  She is extremely happy with her outcome to date.    Past Medical History:   Diagnosis Date   • AF (atrial fibrillation) (CMS/HCC)    • Anesthesia complication     HARD TO WAKE   • Anxiety    • Arrhythmia     atrial fibrillation   • Arthritis     Left hip   • Carotid artery occlusion     right   • Chronic hip pain, left 1/7/2019   • Coronary artery disease    • Dyslipidemia    • History of cardioversion 12/03/2010    initiation of Tambocor and subsequent external cardioversion.   • Hyperlipidemia    • Hypertension    • Hypothyroidism    • Paroxysmal atrial fibrillation (CMS/HCC)    • SSS (sick sinus syndrome) (CMS/HCC)    • Valvular heart disease    • Vertigo    • Wears glasses       Past Surgical History:   Procedure Laterality Date   • BREAST BIOPSY Left     MARKER IN PLACE   • CARDIAC CATHETERIZATION Left 6/16/2016    Procedure: Cardiac catheterization;  Surgeon: Chuck Clark MD;  Location:  [facility] CATH INVASIVE LOCATION;  Service:    • CARDIAC ELECTROPHYSIOLOGY PROCEDURE N/A 2/17/2017    Procedure: Pacemaker DC new;  Surgeon: Darryl Swenson MD;  Location:  [facility] EP INVASIVE LOCATION;  Service:    • CAROTID ENDARTERECTOMY Left    • CATARACT EXTRACTION Bilateral    • COLONOSCOPY     • PACEMAKER IMPLANTATION     • TOTAL HIP ARTHROPLASTY Left 1/30/2019    Procedure: TOTAL HIP ARTHROPLASTY LEFT;  Surgeon: Freddie Vale MD;  Location:  [facility] OR;  Service: Orthopedics   • TUBAL ABDOMINAL LIGATION        Family History   Problem Relation Age of Onset    • Alzheimer's disease Mother    • Cancer Mother    • Aortic aneurysm Father    • Heart valve disorder Sister      Social History     Socioeconomic History   • Marital status:      Spouse name: Not on file   • Number of children: Not on file   • Years of education: Not on file   • Highest education level: Not on file   Social Needs   • Financial resource strain: Not on file   • Food insecurity - worry: Not on file   • Food insecurity - inability: Not on file   • Transportation needs - medical: Not on file   • Transportation needs - non-medical: Not on file   Occupational History   • Occupation: retired   Tobacco Use   • Smoking status: Never Smoker   • Smokeless tobacco: Never Used   Substance and Sexual Activity   • Alcohol use: Yes     Alcohol/week: 0.6 oz     Types: 1 Glasses of wine per week     Frequency: Monthly or less     Comment: OCCASSIONAL   • Drug use: No   • Sexual activity: Defer   Other Topics Concern   • Not on file   Social History Narrative    Denies caffeine use. Lives at home with .       Current Outpatient Medications on File Prior to Visit   Medication Sig Dispense Refill   • acetaminophen (TYLENOL) 325 MG tablet Take 2 tablets by mouth Every 4 (Four) Hours As Needed for Mild Pain .     • amLODIPine (NORVASC) 5 MG tablet Take 5 mg by mouth Daily.     • apixaban (ELIQUIS) 5 MG tablet tablet Take 1 tablet by mouth Every 12 (Twelve) Hours 60 tablet 6   • aspirin 81 MG EC tablet Take 81 mg by mouth Every Night.     • cholecalciferol (VITAMIN D3) 1000 UNITS tablet Take 1,000 Units by mouth Daily.     • coenzyme Q10 100 MG capsule Take 100 mg by mouth Daily.     • dofetilide (TIKOSYN) 250 MCG capsule Take 1 capsule by mouth Every 12 (Twelve) Hours. (Patient taking differently: Take 250 mcg by mouth 2 (Two) Times a Day.) 60 capsule 6   • furosemide (LASIX) 20 MG tablet Take 1 tablet by mouth Daily. Take as needed for edema. 90 tablet 3   • levothyroxine (SYNTHROID, LEVOTHROID) 25 MCG  tablet Take 25 mcg by mouth Every Morning.     • losartan (COZAAR) 100 MG tablet Take 1 tablet by mouth Daily. 30 tablet 6   • meclizine (ANTIVERT) 25 MG tablet Take 25 mg by mouth 3 (Three) Times a Day As Needed for dizziness.     • metoprolol tartrate (LOPRESSOR) 100 MG tablet Take 1 tablet by mouth 2 (Two) Times a Day. 60 tablet 11   • nitroglycerin (NITROSTAT) 0.4 MG SL tablet Place 0.4 mg under the tongue Every 5 (Five) Minutes As Needed for chest pain. Take no more than 3 doses in 15 minutes.     • PARoxetine (PAXIL) 10 MG tablet Take 10 mg by mouth Every Night.     • potassium chloride (K-DUR) 10 MEQ CR tablet Take 1 tablet by mouth Daily. Take with lasix 30 tablet 11   • rosuvastatin (CRESTOR) 10 MG tablet Take 20 mg by mouth Every Night. TAKES ON WEDNESDAYS AND SATURDAYS ONLY       No current facility-administered medications on file prior to visit.       Allergies   Allergen Reactions   • Beta Adrenergic Blockers Dizziness     DIZZINESS    • Sulfa Antibiotics Rash     RASH         Review of Systems   Constitutional: Negative.    HENT: Negative.    Eyes: Negative.    Respiratory: Negative.    Cardiovascular: Negative.    Gastrointestinal: Negative.    Endocrine: Negative.    Genitourinary: Negative.    Musculoskeletal: Positive for arthralgias (left hip).   Skin: Negative.    Allergic/Immunologic: Negative.    Neurological: Negative.    Hematological: Negative.    Psychiatric/Behavioral: Negative.         Physical Exam  not currently breastfeeding.    There is no height or weight on file to calculate BMI.    GENERAL APPEARANCE: awake, alert, oriented, in no acute distress and well developed, well nourished  LUNGS:  breathing nonlabored  EXTREMITIES: no clubbing, cyanosis  PERIPHERAL PULSES: palpable dorsalis pedis and posterior tibial pulses bilaterally.    GAIT:  Normal            Hip Exam:  Left    RANGE OF MOTION:  EXTENSION/FLEXION:  normal (0-110 degrees)  IR:  20  ER:  35  PAIN WITH HIP MOTION:   no  PAIN WITH LOGROLL:  no     STRENGTH:  ABDUCTOR:  5/5  ADDUCTOR:  5/5  HIP FLEXION:  5/5    GREATER TROCHANTER BURSAL PAIN:  no    SENSATION TO LIGHT TOUCH:  DEEP PERONEAL/SUPERFICIAL PERONEAL/SURAL/SAPHENOUS/TIBIAL:   intact    EDEMA: Trace 2+ edema ankle  ERYTHEMA:  no  WOUNDS/INCISIONS:  yes, well-healed posterior lateral hip incision  _______________________________________________________________  _______________________________________________________________    RADIOGRAPHIC FINDINGS:   Indication: Status post left total hip arthroplasty    Comparison: Todays xrays were compared to previous xrays from 2/19/2019    AP pelvis: Left: Demonstrate a well positioned total hip without evidence of wear, loosening, fracture or osteolysis, femoral head is concentrically reduced within the acetabulum and No significant changes compared to prior radiographs.        Assessment/Plan:   Diagnosis Plan   1. Status post total hip replacement, left  XR Pelvis 1 or 2 View     Patient is doing well 6-week status post left total hip arthroplasty.  I recommended continued activity as tolerated without restrictions.  I will see her back in 2 months for repeat assessment x-ray AP pelvis    Freddie Vale MD  03/14/19  1:27 PM

## 2019-04-01 ENCOUNTER — TELEPHONE (OUTPATIENT)
Dept: CARDIOLOGY | Facility: CLINIC | Age: 79
End: 2019-04-01

## 2019-04-01 NOTE — TELEPHONE ENCOUNTER
Received home monitoring transmission that showed Mrs Ruvalcaba has been in a fib for the last 24 hrs. I called to check on her and there was no answer. I will check [employer] tomorrow and verify if she is still in atrial fibrillation and try to contact her again at that time.

## 2019-04-05 ENCOUNTER — PREP FOR SURGERY (OUTPATIENT)
Dept: OTHER | Facility: HOSPITAL | Age: 79
End: 2019-04-05

## 2019-04-05 DIAGNOSIS — I48.0 PAROXYSMAL ATRIAL FIBRILLATION (HCC): Primary | ICD-10-CM

## 2019-04-05 RX ORDER — SODIUM CHLORIDE 0.9 % (FLUSH) 0.9 %
1-10 SYRINGE (ML) INJECTION AS NEEDED
Status: CANCELLED | OUTPATIENT
Start: 2019-04-05

## 2019-04-05 RX ORDER — SODIUM CHLORIDE 0.9 % (FLUSH) 0.9 %
3 SYRINGE (ML) INJECTION EVERY 12 HOURS SCHEDULED
Status: CANCELLED | OUTPATIENT
Start: 2019-04-05

## 2019-04-05 RX ORDER — ACETAMINOPHEN 325 MG/1
650 TABLET ORAL EVERY 4 HOURS PRN
Status: CANCELLED | OUTPATIENT
Start: 2019-04-05

## 2019-04-05 RX ORDER — PROMETHAZINE HYDROCHLORIDE 25 MG/ML
12.5 INJECTION, SOLUTION INTRAMUSCULAR; INTRAVENOUS EVERY 4 HOURS PRN
Status: CANCELLED | OUTPATIENT
Start: 2019-04-05

## 2019-04-05 RX ORDER — SODIUM CHLORIDE 9 MG/ML
1 INJECTION, SOLUTION INTRAVENOUS CONTINUOUS
Status: CANCELLED | OUTPATIENT
Start: 2019-04-05 | End: 2019-04-05

## 2019-04-07 ENCOUNTER — APPOINTMENT (OUTPATIENT)
Dept: PREADMISSION TESTING | Facility: HOSPITAL | Age: 79
End: 2019-04-07

## 2019-04-07 ENCOUNTER — HOSPITAL ENCOUNTER (OUTPATIENT)
Dept: CT IMAGING | Facility: HOSPITAL | Age: 79
Discharge: HOME | End: 2019-04-07
Admitting: INTERNAL MEDICINE

## 2019-04-07 DIAGNOSIS — I48.0 PAROXYSMAL ATRIAL FIBRILLATION (HCC): ICD-10-CM

## 2019-04-07 LAB
ANION GAP SERPL CALCULATED.3IONS-SCNC: 10 MMOL/L (ref 3–11)
BUN BLD-MCNC: 14 MG/DL (ref 9–23)
BUN/CREAT SERPL: 14.7 (ref 7–25)
CALCIUM SPEC-SCNC: 9.7 MG/DL (ref 8.7–10.4)
CHLORIDE SERPL-SCNC: 102 MMOL/L (ref 99–109)
CO2 SERPL-SCNC: 31 MMOL/L (ref 20–31)
CREAT BLD-MCNC: 0.95 MG/DL (ref 0.6–1.3)
DEPRECATED RDW RBC AUTO: 47.8 FL (ref 37–54)
ERYTHROCYTE [DISTWIDTH] IN BLOOD BY AUTOMATED COUNT: 14.7 % (ref 11.3–14.5)
GFR SERPL CREATININE-BSD FRML MDRD: 57 ML/MIN/1.73
GLUCOSE BLD-MCNC: 97 MG/DL (ref 70–100)
HBA1C MFR BLD: 5.8 % (ref 4.8–5.6)
HCT VFR BLD AUTO: 37.5 % (ref 34.5–44)
HGB BLD-MCNC: 11.8 G/DL (ref 11.5–15.5)
INR PPP: 1.39 (ref 0.85–1.16)
MCH RBC QN AUTO: 27.9 PG (ref 27–31)
MCHC RBC AUTO-ENTMCNC: 31.5 G/DL (ref 32–36)
MCV RBC AUTO: 88.7 FL (ref 80–99)
PLATELET # BLD AUTO: 254 10*3/MM3 (ref 150–450)
PMV BLD AUTO: 10.2 FL (ref 6–12)
POTASSIUM BLD-SCNC: 4.1 MMOL/L (ref 3.5–5.5)
PROTHROMBIN TIME: 16.4 SECONDS (ref 11.2–14.3)
RBC # BLD AUTO: 4.23 10*6/MM3 (ref 3.89–5.14)
SODIUM BLD-SCNC: 143 MMOL/L (ref 132–146)
WBC NRBC COR # BLD: 6.26 10*3/MM3 (ref 3.5–10.8)

## 2019-04-07 PROCEDURE — 85610 PROTHROMBIN TIME: CPT | Performed by: PHYSICIAN ASSISTANT

## 2019-04-07 PROCEDURE — 0 IOPAMIDOL PER 1 ML: Performed by: INTERNAL MEDICINE

## 2019-04-07 PROCEDURE — 36415 COLL VENOUS BLD VENIPUNCTURE: CPT

## 2019-04-07 PROCEDURE — 84439 ASSAY OF FREE THYROXINE: CPT | Performed by: INTERNAL MEDICINE

## 2019-04-07 PROCEDURE — 85027 COMPLETE CBC AUTOMATED: CPT | Performed by: PHYSICIAN ASSISTANT

## 2019-04-07 PROCEDURE — 80048 BASIC METABOLIC PNL TOTAL CA: CPT | Performed by: PHYSICIAN ASSISTANT

## 2019-04-07 PROCEDURE — 84443 ASSAY THYROID STIM HORMONE: CPT | Performed by: PHYSICIAN ASSISTANT

## 2019-04-07 PROCEDURE — 71275 CT ANGIOGRAPHY CHEST: CPT

## 2019-04-07 PROCEDURE — 83036 HEMOGLOBIN GLYCOSYLATED A1C: CPT | Performed by: PHYSICIAN ASSISTANT

## 2019-04-07 RX ADMIN — IOPAMIDOL 80 ML: 755 INJECTION, SOLUTION INTRAVENOUS at 14:03

## 2019-04-07 NOTE — DISCHARGE INSTRUCTIONS
"Dear Patient,    Do NOT eat, drink, or smoke after midnight the night before your procedure.   Take your medications as instructed by your doctor.    Glasses and jewelry may be worn, but dentures must be removed prior to your procedure.    Leave any items you consider valuable at home.      MORNING of your Procedure, please bring the following:     -Photo ID and insurance card(s)    -ALL medications in their ORIGINAL CONTAINERS    -Co-pay and/or deductible required by your insurance   -Copy of living will or power of  document (if not brought to    Pre-Admission Testing department)   -CPAP mask and tubing, not your machine (if applicable)    -Relaxation aids (music, books, magazines)   -Skin Prep Instruction Sheet (if applicable)   -Relaxation Aids    Check in on the 2nd floor in the [address] [facility].  Your procedure will be performed in the cath lab or EP lab.  During your procedure, your family will wait in the cath lab waiting area where you checked in.      Need to make arrangements for transportation prior to discharge.    A handout regarding \"Heart Healthy Eating\" was provided today to encourage healthy eating habits.    Booklet published by [employer] was given in Pre-Admission testing.  This booklet is for informational purposes only.  If you have any questions about your procedure, please speak with your physician.      Please note:  If you are scheduled to have one of the following procedures: Pulmonary Vein Ablation, Lead Extraction, MitraClip, Cerebral Coilings or Embolization, please let your family know that after your procedure you will be going to recovery unit on the 2nd floor of the [facility].  When the physician is finished speaking with your family after your procedure is completed, your family will be directed or escorted to the surgery waiting area in the [facility].  This is where your family will wait until you are given a room assignment and then your family will be directed to the " appropriate unit.

## 2019-04-08 ENCOUNTER — ANESTHESIA EVENT (OUTPATIENT)
Dept: CARDIOLOGY | Facility: HOSPITAL | Age: 79
End: 2019-04-08

## 2019-04-08 ENCOUNTER — HOSPITAL ENCOUNTER (OUTPATIENT)
Facility: HOSPITAL | Age: 79
Discharge: HOME | End: 2019-04-09
Attending: INTERNAL MEDICINE | Admitting: INTERNAL MEDICINE

## 2019-04-08 ENCOUNTER — ANESTHESIA (OUTPATIENT)
Dept: CARDIOLOGY | Facility: HOSPITAL | Age: 79
End: 2019-04-08

## 2019-04-08 DIAGNOSIS — I48.0 PAROXYSMAL ATRIAL FIBRILLATION (HCC): ICD-10-CM

## 2019-04-08 LAB
ACT BLD: 147 SECONDS (ref 82–152)
ACT BLD: 164 SECONDS (ref 82–152)
ACT BLD: 373 SECONDS (ref 82–152)
ACT BLD: 378 SECONDS (ref 82–152)
ACT BLD: 395 SECONDS (ref 82–152)
ACT BLD: 411 SECONDS (ref 82–152)
ACT BLD: 417 SECONDS (ref 82–152)
ACT BLD: 428 SECONDS (ref 82–152)
TSH SERPL DL<=0.05 MIU/L-ACNC: 4.98 MIU/ML (ref 0.27–4.2)

## 2019-04-08 PROCEDURE — 93655 ICAR CATH ABLTJ DSCRT ARRHYT: CPT | Performed by: INTERNAL MEDICINE

## 2019-04-08 PROCEDURE — C1759 CATH, INTRA ECHOCARDIOGRAPHY: HCPCS | Performed by: INTERNAL MEDICINE

## 2019-04-08 PROCEDURE — A9270 NON-COVERED ITEM OR SERVICE: HCPCS | Performed by: PHYSICIAN ASSISTANT

## 2019-04-08 PROCEDURE — C1894 INTRO/SHEATH, NON-LASER: HCPCS | Performed by: INTERNAL MEDICINE

## 2019-04-08 PROCEDURE — 93662 INTRACARDIAC ECG (ICE): CPT | Performed by: INTERNAL MEDICINE

## 2019-04-08 PROCEDURE — 63710000001 PAROXETINE 20 MG TABLET: Performed by: PHYSICIAN ASSISTANT

## 2019-04-08 PROCEDURE — 25010000002 HEPARIN (PORCINE) PER 1000 UNITS: Performed by: INTERNAL MEDICINE

## 2019-04-08 PROCEDURE — 63710000001 TRAZODONE 50 MG TABLET: Performed by: PHYSICIAN ASSISTANT

## 2019-04-08 PROCEDURE — 25010000002 VANCOMYCIN: Performed by: PHYSICIAN ASSISTANT

## 2019-04-08 PROCEDURE — C1732 CATH, EP, DIAG/ABL, 3D/VECT: HCPCS | Performed by: INTERNAL MEDICINE

## 2019-04-08 PROCEDURE — 93623 PRGRMD STIMJ&PACG IV RX NFS: CPT | Performed by: INTERNAL MEDICINE

## 2019-04-08 PROCEDURE — C1893 INTRO/SHEATH, FIXED,NON-PEEL: HCPCS | Performed by: INTERNAL MEDICINE

## 2019-04-08 PROCEDURE — 93657 TX L/R ATRIAL FIB ADDL: CPT | Performed by: INTERNAL MEDICINE

## 2019-04-08 PROCEDURE — 63710000001 APIXABAN 5 MG TABLET: Performed by: INTERNAL MEDICINE

## 2019-04-08 PROCEDURE — 63710000001 HYDROCODONE-ACETAMINOPHEN 5-325 MG TABLET: Performed by: INTERNAL MEDICINE

## 2019-04-08 PROCEDURE — A9270 NON-COVERED ITEM OR SERVICE: HCPCS | Performed by: INTERNAL MEDICINE

## 2019-04-08 PROCEDURE — 25010000002 DEXAMETHASONE PER 1 MG: Performed by: NURSE ANESTHETIST, CERTIFIED REGISTERED

## 2019-04-08 PROCEDURE — 63710000001 METOPROLOL TARTRATE 100 MG TABLET: Performed by: PHYSICIAN ASSISTANT

## 2019-04-08 PROCEDURE — 25010000002 KETOROLAC TROMETHAMINE PER 15 MG: Performed by: INTERNAL MEDICINE

## 2019-04-08 PROCEDURE — 93656 COMPRE EP EVAL ABLTJ ATR FIB: CPT | Performed by: INTERNAL MEDICINE

## 2019-04-08 PROCEDURE — 25010000002 PROTAMINE SULFATE PER 10 MG: Performed by: INTERNAL MEDICINE

## 2019-04-08 PROCEDURE — C1730 CATH, EP, 19 OR FEW ELECT: HCPCS | Performed by: INTERNAL MEDICINE

## 2019-04-08 PROCEDURE — 93613 INTRACARDIAC EPHYS 3D MAPG: CPT | Performed by: INTERNAL MEDICINE

## 2019-04-08 PROCEDURE — 85347 COAGULATION TIME ACTIVATED: CPT

## 2019-04-08 PROCEDURE — 63710000001 SUCRALFATE 1 G TABLET: Performed by: INTERNAL MEDICINE

## 2019-04-08 PROCEDURE — 63710000001 ASPIRIN 81 MG TABLET DELAYED-RELEASE: Performed by: PHYSICIAN ASSISTANT

## 2019-04-08 PROCEDURE — C1769 GUIDE WIRE: HCPCS | Performed by: INTERNAL MEDICINE

## 2019-04-08 PROCEDURE — 25010000002 PHENYLEPHRINE PER 1 ML: Performed by: NURSE ANESTHETIST, CERTIFIED REGISTERED

## 2019-04-08 PROCEDURE — 25010000002 FUROSEMIDE PER 20 MG: Performed by: INTERNAL MEDICINE

## 2019-04-08 PROCEDURE — 25010000002 PROPOFOL 10 MG/ML EMULSION: Performed by: NURSE ANESTHETIST, CERTIFIED REGISTERED

## 2019-04-08 RX ORDER — PROMETHAZINE HYDROCHLORIDE 25 MG/ML
12.5 INJECTION, SOLUTION INTRAMUSCULAR; INTRAVENOUS EVERY 4 HOURS PRN
Status: DISCONTINUED | OUTPATIENT
Start: 2019-04-08 | End: 2019-04-08 | Stop reason: HOSPADM

## 2019-04-08 RX ORDER — LEVOTHYROXINE SODIUM 0.03 MG/1
25 TABLET ORAL EVERY MORNING
Status: DISCONTINUED | OUTPATIENT
Start: 2019-04-09 | End: 2019-04-09 | Stop reason: HOSPADM

## 2019-04-08 RX ORDER — PAROXETINE HYDROCHLORIDE 20 MG/1
10 TABLET, FILM COATED ORAL NIGHTLY
Status: DISCONTINUED | OUTPATIENT
Start: 2019-04-08 | End: 2019-04-09 | Stop reason: HOSPADM

## 2019-04-08 RX ORDER — ACETAMINOPHEN 160 MG/5ML
650 SOLUTION ORAL EVERY 4 HOURS PRN
Status: DISCONTINUED | OUTPATIENT
Start: 2019-04-08 | End: 2019-04-09 | Stop reason: HOSPADM

## 2019-04-08 RX ORDER — SODIUM CHLORIDE 0.9 % (FLUSH) 0.9 %
3 SYRINGE (ML) INJECTION EVERY 12 HOURS SCHEDULED
Status: DISCONTINUED | OUTPATIENT
Start: 2019-04-08 | End: 2019-04-08 | Stop reason: HOSPADM

## 2019-04-08 RX ORDER — FENTANYL CITRATE 50 UG/ML
50 INJECTION, SOLUTION INTRAMUSCULAR; INTRAVENOUS
Status: DISCONTINUED | OUTPATIENT
Start: 2019-04-08 | End: 2019-04-08 | Stop reason: HOSPADM

## 2019-04-08 RX ORDER — PAROXETINE HYDROCHLORIDE 20 MG/1
20 TABLET, FILM COATED ORAL NIGHTLY
Status: ON HOLD | COMMUNITY
End: 2020-01-30 | Stop reason: DRUGHIGH

## 2019-04-08 RX ORDER — NICOTINE POLACRILEX 4 MG
15 LOZENGE BUCCAL
Status: DISCONTINUED | OUTPATIENT
Start: 2019-04-08 | End: 2019-04-08 | Stop reason: HOSPADM

## 2019-04-08 RX ORDER — LIDOCAINE HYDROCHLORIDE 10 MG/ML
INJECTION, SOLUTION EPIDURAL; INFILTRATION; INTRACAUDAL; PERINEURAL AS NEEDED
Status: DISCONTINUED | OUTPATIENT
Start: 2019-04-08 | End: 2019-04-08 | Stop reason: SURG

## 2019-04-08 RX ORDER — ACETAMINOPHEN 650 MG/1
650 SUPPOSITORY RECTAL EVERY 4 HOURS PRN
Status: DISCONTINUED | OUTPATIENT
Start: 2019-04-08 | End: 2019-04-09 | Stop reason: HOSPADM

## 2019-04-08 RX ORDER — SODIUM CHLORIDE, SODIUM LACTATE, POTASSIUM CHLORIDE, CALCIUM CHLORIDE 600; 310; 30; 20 MG/100ML; MG/100ML; MG/100ML; MG/100ML
INJECTION, SOLUTION INTRAVENOUS CONTINUOUS PRN
Status: DISCONTINUED | OUTPATIENT
Start: 2019-04-08 | End: 2019-04-08 | Stop reason: SURG

## 2019-04-08 RX ORDER — ACETAMINOPHEN 325 MG/1
650 TABLET ORAL EVERY 4 HOURS PRN
Status: DISCONTINUED | OUTPATIENT
Start: 2019-04-08 | End: 2019-04-09 | Stop reason: HOSPADM

## 2019-04-08 RX ORDER — PROPOFOL 10 MG/ML
VIAL (ML) INTRAVENOUS AS NEEDED
Status: DISCONTINUED | OUTPATIENT
Start: 2019-04-08 | End: 2019-04-08 | Stop reason: SURG

## 2019-04-08 RX ORDER — SODIUM CHLORIDE 9 MG/ML
INJECTION, SOLUTION INTRAVENOUS CONTINUOUS PRN
Status: COMPLETED | OUTPATIENT
Start: 2019-04-08 | End: 2019-04-08

## 2019-04-08 RX ORDER — ROSUVASTATIN CALCIUM 20 MG/1
20 TABLET, COATED ORAL 2 TIMES WEEKLY
Status: DISCONTINUED | OUTPATIENT
Start: 2019-04-10 | End: 2019-04-09 | Stop reason: HOSPADM

## 2019-04-08 RX ORDER — METOPROLOL TARTRATE 100 MG/1
100 TABLET ORAL 2 TIMES DAILY
Status: DISCONTINUED | OUTPATIENT
Start: 2019-04-08 | End: 2019-04-09 | Stop reason: HOSPADM

## 2019-04-08 RX ORDER — KETOROLAC TROMETHAMINE 30 MG/ML
15 INJECTION, SOLUTION INTRAMUSCULAR; INTRAVENOUS EVERY 8 HOURS SCHEDULED
Status: DISCONTINUED | OUTPATIENT
Start: 2019-04-08 | End: 2019-04-09 | Stop reason: HOSPADM

## 2019-04-08 RX ORDER — ROSUVASTATIN CALCIUM 20 MG/1
20 TABLET, COATED ORAL 3 TIMES WEEKLY
COMMUNITY

## 2019-04-08 RX ORDER — SODIUM CHLORIDE 0.9 % (FLUSH) 0.9 %
1-10 SYRINGE (ML) INJECTION AS NEEDED
Status: DISCONTINUED | OUTPATIENT
Start: 2019-04-08 | End: 2019-04-08 | Stop reason: HOSPADM

## 2019-04-08 RX ORDER — SODIUM CHLORIDE 9 MG/ML
1 INJECTION, SOLUTION INTRAVENOUS CONTINUOUS
Status: DISCONTINUED | OUTPATIENT
Start: 2019-04-08 | End: 2019-04-08

## 2019-04-08 RX ORDER — ATRACURIUM BESYLATE 10 MG/ML
INJECTION, SOLUTION INTRAVENOUS AS NEEDED
Status: DISCONTINUED | OUTPATIENT
Start: 2019-04-08 | End: 2019-04-08 | Stop reason: SURG

## 2019-04-08 RX ORDER — ONDANSETRON 2 MG/ML
4 INJECTION INTRAMUSCULAR; INTRAVENOUS ONCE AS NEEDED
Status: DISCONTINUED | OUTPATIENT
Start: 2019-04-08 | End: 2019-04-08 | Stop reason: HOSPADM

## 2019-04-08 RX ORDER — MEPERIDINE HYDROCHLORIDE 25 MG/ML
12.5 INJECTION INTRAMUSCULAR; INTRAVENOUS; SUBCUTANEOUS
Status: DISCONTINUED | OUTPATIENT
Start: 2019-04-08 | End: 2019-04-08 | Stop reason: HOSPADM

## 2019-04-08 RX ORDER — PANTOPRAZOLE SODIUM 40 MG/1
40 TABLET, DELAYED RELEASE ORAL
Status: DISCONTINUED | OUTPATIENT
Start: 2019-04-08 | End: 2019-04-09 | Stop reason: HOSPADM

## 2019-04-08 RX ORDER — ACETAMINOPHEN 325 MG/1
650 TABLET ORAL EVERY 4 HOURS PRN
Status: DISCONTINUED | OUTPATIENT
Start: 2019-04-08 | End: 2019-04-08 | Stop reason: HOSPADM

## 2019-04-08 RX ORDER — SUCRALFATE ORAL 1 G/10ML
1 SUSPENSION ORAL
Qty: 210 ML | Refills: 0 | Status: SHIPPED | OUTPATIENT
Start: 2019-04-08 | End: 2019-04-08 | Stop reason: HOSPADM

## 2019-04-08 RX ORDER — TRAZODONE HYDROCHLORIDE 50 MG/1
50-100 TABLET ORAL NIGHTLY PRN
COMMUNITY
End: 2019-07-10

## 2019-04-08 RX ORDER — HYDROCODONE BITARTRATE AND ACETAMINOPHEN 5; 325 MG/1; MG/1
1 TABLET ORAL EVERY 4 HOURS PRN
Status: DISCONTINUED | OUTPATIENT
Start: 2019-04-08 | End: 2019-04-09 | Stop reason: HOSPADM

## 2019-04-08 RX ORDER — PROTAMINE SULFATE 10 MG/ML
INJECTION, SOLUTION INTRAVENOUS AS NEEDED
Status: DISCONTINUED | OUTPATIENT
Start: 2019-04-08 | End: 2019-04-08 | Stop reason: HOSPADM

## 2019-04-08 RX ORDER — PANTOPRAZOLE SODIUM 40 MG/1
40 TABLET, DELAYED RELEASE ORAL DAILY
Qty: 30 TABLET | Refills: 0 | Status: SHIPPED | OUTPATIENT
Start: 2019-04-08 | End: 2019-05-09

## 2019-04-08 RX ORDER — AMLODIPINE BESYLATE 5 MG/1
5 TABLET ORAL EVERY MORNING
Status: DISCONTINUED | OUTPATIENT
Start: 2019-04-09 | End: 2019-04-09 | Stop reason: HOSPADM

## 2019-04-08 RX ORDER — FUROSEMIDE 20 MG/1
20 TABLET ORAL DAILY PRN
Status: DISCONTINUED | OUTPATIENT
Start: 2019-04-08 | End: 2019-04-09 | Stop reason: HOSPADM

## 2019-04-08 RX ORDER — HEPARIN SODIUM 1000 [USP'U]/ML
INJECTION, SOLUTION INTRAVENOUS; SUBCUTANEOUS AS NEEDED
Status: DISCONTINUED | OUTPATIENT
Start: 2019-04-08 | End: 2019-04-08 | Stop reason: HOSPADM

## 2019-04-08 RX ORDER — DEXAMETHASONE SODIUM PHOSPHATE 4 MG/ML
INJECTION, SOLUTION INTRA-ARTICULAR; INTRALESIONAL; INTRAMUSCULAR; INTRAVENOUS; SOFT TISSUE AS NEEDED
Status: DISCONTINUED | OUTPATIENT
Start: 2019-04-08 | End: 2019-04-08 | Stop reason: SURG

## 2019-04-08 RX ORDER — LIDOCAINE HYDROCHLORIDE 10 MG/ML
INJECTION, SOLUTION INFILTRATION; PERINEURAL AS NEEDED
Status: DISCONTINUED | OUTPATIENT
Start: 2019-04-08 | End: 2019-04-08 | Stop reason: HOSPADM

## 2019-04-08 RX ORDER — LOSARTAN POTASSIUM 50 MG/1
100 TABLET ORAL EVERY MORNING
Status: DISCONTINUED | OUTPATIENT
Start: 2019-04-09 | End: 2019-04-09 | Stop reason: HOSPADM

## 2019-04-08 RX ORDER — SUCRALFATE 1 G/1
1 TABLET ORAL
Status: DISCONTINUED | OUTPATIENT
Start: 2019-04-08 | End: 2019-04-09 | Stop reason: HOSPADM

## 2019-04-08 RX ORDER — ESOMEPRAZOLE MAGNESIUM 40 MG/1
40 CAPSULE, DELAYED RELEASE ORAL
Qty: 30 CAPSULE | Refills: 0 | Status: SHIPPED | OUTPATIENT
Start: 2019-04-08 | End: 2019-04-08 | Stop reason: HOSPADM

## 2019-04-08 RX ORDER — TRAZODONE HYDROCHLORIDE 50 MG/1
50 TABLET ORAL NIGHTLY PRN
Status: DISCONTINUED | OUTPATIENT
Start: 2019-04-08 | End: 2019-04-09 | Stop reason: HOSPADM

## 2019-04-08 RX ORDER — DEXTROSE MONOHYDRATE 25 G/50ML
25 INJECTION, SOLUTION INTRAVENOUS
Status: DISCONTINUED | OUTPATIENT
Start: 2019-04-08 | End: 2019-04-08 | Stop reason: HOSPADM

## 2019-04-08 RX ORDER — FUROSEMIDE 10 MG/ML
INJECTION INTRAMUSCULAR; INTRAVENOUS AS NEEDED
Status: DISCONTINUED | OUTPATIENT
Start: 2019-04-08 | End: 2019-04-08 | Stop reason: HOSPADM

## 2019-04-08 RX ORDER — ASPIRIN 81 MG/1
81 TABLET ORAL NIGHTLY
Status: DISCONTINUED | OUTPATIENT
Start: 2019-04-08 | End: 2019-04-09 | Stop reason: HOSPADM

## 2019-04-08 RX ORDER — SUCRALFATE 1 G/1
1 TABLET ORAL
Qty: 21 TABLET | Refills: 0 | Status: SHIPPED | OUTPATIENT
Start: 2019-04-08 | End: 2019-04-16

## 2019-04-08 RX ADMIN — PHENYLEPHRINE HYDROCHLORIDE 1 MCG/KG/MIN: 10 INJECTION INTRAVENOUS at 09:54

## 2019-04-08 RX ADMIN — PHENYLEPHRINE HYDROCHLORIDE 80 MCG: 10 INJECTION INTRAVENOUS at 09:34

## 2019-04-08 RX ADMIN — PAROXETINE HYDROCHLORIDE 10 MG: 20 TABLET, FILM COATED ORAL at 21:01

## 2019-04-08 RX ADMIN — SODIUM CHLORIDE 1 ML/KG/HR: 9 INJECTION, SOLUTION INTRAVENOUS at 07:48

## 2019-04-08 RX ADMIN — ASPIRIN 81 MG: 81 TABLET, COATED ORAL at 21:01

## 2019-04-08 RX ADMIN — TRAZODONE HYDROCHLORIDE 50 MG: 50 TABLET ORAL at 21:01

## 2019-04-08 RX ADMIN — ATRACURIUM BESYLATE 10 MG: 10 INJECTION, SOLUTION INTRAVENOUS at 10:04

## 2019-04-08 RX ADMIN — VANCOMYCIN HYDROCHLORIDE 1500 MG: 10 INJECTION, POWDER, LYOPHILIZED, FOR SOLUTION INTRAVENOUS at 08:49

## 2019-04-08 RX ADMIN — PHENYLEPHRINE HYDROCHLORIDE 80 MCG: 10 INJECTION INTRAVENOUS at 09:26

## 2019-04-08 RX ADMIN — PHENYLEPHRINE HYDROCHLORIDE 80 MCG: 10 INJECTION INTRAVENOUS at 09:54

## 2019-04-08 RX ADMIN — APIXABAN 5 MG: 5 TABLET, FILM COATED ORAL at 21:01

## 2019-04-08 RX ADMIN — PHENYLEPHRINE HYDROCHLORIDE 80 MCG: 10 INJECTION INTRAVENOUS at 09:12

## 2019-04-08 RX ADMIN — KETOROLAC TROMETHAMINE 15 MG: 30 INJECTION, SOLUTION INTRAMUSCULAR; INTRAVENOUS at 21:02

## 2019-04-08 RX ADMIN — ATRACURIUM BESYLATE 10 MG: 10 INJECTION, SOLUTION INTRAVENOUS at 11:03

## 2019-04-08 RX ADMIN — LIDOCAINE HYDROCHLORIDE 50 MG: 10 INJECTION, SOLUTION EPIDURAL; INFILTRATION; INTRACAUDAL; PERINEURAL at 08:30

## 2019-04-08 RX ADMIN — PROPOFOL 120 MG: 10 INJECTION, EMULSION INTRAVENOUS at 08:30

## 2019-04-08 RX ADMIN — LIDOCAINE HYDROCHLORIDE 20 MG: 10 INJECTION, SOLUTION EPIDURAL; INFILTRATION; INTRACAUDAL; PERINEURAL at 09:04

## 2019-04-08 RX ADMIN — PHENYLEPHRINE HYDROCHLORIDE 80 MCG: 10 INJECTION INTRAVENOUS at 09:18

## 2019-04-08 RX ADMIN — SODIUM CHLORIDE, POTASSIUM CHLORIDE, SODIUM LACTATE AND CALCIUM CHLORIDE: 600; 310; 30; 20 INJECTION, SOLUTION INTRAVENOUS at 08:17

## 2019-04-08 RX ADMIN — ATRACURIUM BESYLATE 60 MG: 10 INJECTION, SOLUTION INTRAVENOUS at 08:30

## 2019-04-08 RX ADMIN — DEXAMETHASONE SODIUM PHOSPHATE 8 MG: 4 INJECTION, SOLUTION INTRAMUSCULAR; INTRAVENOUS at 09:05

## 2019-04-08 RX ADMIN — PHENYLEPHRINE HYDROCHLORIDE 80 MCG: 10 INJECTION INTRAVENOUS at 08:59

## 2019-04-08 RX ADMIN — PHENYLEPHRINE HYDROCHLORIDE 80 MCG: 10 INJECTION INTRAVENOUS at 09:45

## 2019-04-08 RX ADMIN — KETOROLAC TROMETHAMINE 15 MG: 30 INJECTION, SOLUTION INTRAMUSCULAR; INTRAVENOUS at 17:01

## 2019-04-08 RX ADMIN — ATRACURIUM BESYLATE 20 MG: 10 INJECTION, SOLUTION INTRAVENOUS at 09:02

## 2019-04-08 RX ADMIN — SUCRALFATE 1 G: 1 TABLET ORAL at 21:02

## 2019-04-08 RX ADMIN — METOPROLOL TARTRATE 100 MG: 100 TABLET ORAL at 21:02

## 2019-04-08 RX ADMIN — HYDROCODONE BITARTRATE AND ACETAMINOPHEN 1 TABLET: 5; 325 TABLET ORAL at 18:58

## 2019-04-08 RX ADMIN — SUCRALFATE 1 G: 1 TABLET ORAL at 17:01

## 2019-04-08 NOTE — ANESTHESIA POSTPROCEDURE EVALUATION
Patient: Kenisha Ruvalcaba    Procedure Summary     Date:  04/08/19 Room / Location:  [facility] CATH/EP LAB F / [facility] EP INVASIVE LOCATION    Anesthesia Start:  0817 Anesthesia Stop:      Procedure:  Ablation atrial fibrillation, hold Tikosyn for 5 days stay on metoprolol. (N/A ) Diagnosis:       Paroxysmal atrial fibrillation (CMS/HCC)      (afib)    Provider:  Darryl Swenson MD Provider:  Luis Enrique Kerr MD    Anesthesia Type:  general ASA Status:  3          Anesthesia Type: general  Last vitals  BP   103/73 (04/08/19 0635)   Temp   97 °F (36.1 °C) (04/08/19 0633)   Pulse   83 (04/08/19 0633)   Resp   16 (04/08/19 0633)     SpO2   97 % (04/08/19 0633)     Post Anesthesia Care and Evaluation    Patient location during evaluation: PACU  Patient participation: complete - patient participated  Level of consciousness: awake and alert  Pain score: 0  Pain management: adequate  Airway patency: patent  Anesthetic complications: No anesthetic complications  PONV Status: none  Cardiovascular status: hemodynamically stable and acceptable  Respiratory status: nonlabored ventilation, acceptable and nasal cannula  Hydration status: acceptable

## 2019-04-08 NOTE — H&P
Cardiology H&P     Kenisha Ruvalcaba  1940    There is no work phone number on file.    04/08/19    DATE OF ADMISSION: 4/8/2019  [facility] L[facility] MD  [address] / [facility] [place] [address]    CC: Paroxysmal atrial fibrillation      Problem List:   1. Paroxysmal atrial fibrillation:  a. Echocardiogram, 09/10/2010: Left atrium 4.6. Moderate left atrial enlargement. Ejection fraction 55%. Severe right atrial enlargement and 2+ tricuspid regurgitation with RVSP 37 mmHg.  b. Coumadin initiation, 09/07/2010.  c. Initiation of Tambocor and subsequent external cardioversion, 12/03/2010.  d. Echocardiogram, 12/03/2010: Moderate mitral regurgitation, moderate tricuspid regurgitation, normal left ventricular size and function, negative bubble study.   e. External cardioversion to sinus rhythm, December 2010.  f. CHADS-VASc score equals 4 to 6, on Eliquis  g. Echocardiogram 10/2016: EF >70%, grade II diastolic dysfunction, LA cavity mildly dilated, mild MR, RVSP 45-55 mmHg  h. Recurrent atrial fibrillation despite Flecainide, flecainide discontinued 09/2018  i. Tikosyn initiation 09/2018, Metoprolol increased to 100mg BID 1/19     2. Sick sinus syndrome:   a. Patient was hospitalized at [facility], 04/26/2016 through 04/28/2016 with a 30-day Event monitor.   b. Event recorder 12/16/16: 9% AF, 3.3 sec pauses, HR range from 37 bpm - 151 bpm  c. PM implant 2/17/17- BTK   3. Mild coronary artery disease on cardiac catheterization, 02/06/2013. And [facility] on 6/6/16, normal EF  4. Carotid artery stenosis, status post left CEA:  a. Carotid ultrasound, March 2013, with no significant stenosis.  5. Valvular heart disease:  a. Echo, 01/19/2013: Left ventricular ejection fraction 55% to 60%, mild to moderate mitral regurgitation, RVSP 46 mmHg.   6. Hypertension.   7. Dyslipidemia.   8. Vertigo.   9. Anxiety.   10. Surgical history:  a. Tubal ligation.   b. Left CEA, March  2008.    History of Present Illness:   78-year-old white female well-known to Dr. Swenson with history of sick sinus syndrome with Biotronik dual-chamber pacemaker, paroxysmal atrial fibrillation on Tikosyn and Eliquis, mild valvular heart disease and hypertension who presents today for pulmonary vein ablation.  She was initiated on Tikosyn in the fall 2018, however has had side effects of diarrhea and also increased burden of atrial fibrillation.  On her last device check in February she had 34% atrial fibrillation.  Her symptoms and atrial fibrillation are moderately severe in nature and include extreme fatigue and lifeless this.  She denies palpitations, chest pain, shortness of breath, and syncope.  She has been compliant with her Eliquis with no missed doses.  She denies any recent CVA-like symptoms.  When she is in normal sinus rhythm she feels well with good energy.  She denies any recent infections.  She has chronic diarrhea with Tikosyn.     Allergies   Allergen Reactions   • Beta Adrenergic Blockers Dizziness     DIZZINESS    • Sulfa Antibiotics Rash     RASH        Prior to Admission Medications     Prescriptions Last Dose Informant Patient Reported? Taking?    acetaminophen (TYLENOL) 325 MG tablet Past Week  No Yes    Take 2 tablets by mouth Every 4 (Four) Hours As Needed for Mild Pain .    amLODIPine (NORVASC) 5 MG tablet 4/8/2019 Self Yes Yes    Take 5 mg by mouth Every Morning.    apixaban (ELIQUIS) 5 MG tablet tablet 4/8/2019  No Yes    Take 1 tablet by mouth Every 12 (Twelve) Hours    aspirin 81 MG EC tablet 4/7/2019 Self Yes Yes    Take 81 mg by mouth Every Night.    cholecalciferol (VITAMIN D3) 1000 UNITS tablet 4/7/2019 Self Yes Yes    Take 1,000 Units by mouth Daily.    coenzyme Q10 100 MG capsule 4/7/2019 Self Yes Yes    Take 100 mg by mouth Daily.    furosemide (LASIX) 20 MG tablet 4/8/2019 Self No Yes    Take 1 tablet by mouth Daily. Take as needed for edema.    Patient taking differently:   Take 20 mg by mouth Daily As Needed. Take as needed for edema.    levothyroxine (SYNTHROID, LEVOTHROID) 25 MCG tablet 4/8/2019 Self Yes Yes    Take 25 mcg by mouth Every Morning.    losartan (COZAAR) 100 MG tablet 4/8/2019 Self No Yes    Take 1 tablet by mouth Daily.    Patient taking differently:  Take 100 mg by mouth Every Morning.    metoprolol tartrate (LOPRESSOR) 100 MG tablet 4/8/2019 Self No Yes    Take 1 tablet by mouth 2 (Two) Times a Day.    PARoxetine (PAXIL) 20 MG tablet 4/7/2019  Yes Yes    Take 10 mg by mouth Every Night.    rosuvastatin (CRESTOR) 20 MG tablet 4/6/2019  Yes Yes    Take 20 mg by mouth 2 (Two) Times a Week. Wed and Sat    traZODone (DESYREL) 50 MG tablet 4/6/2019  Yes Yes    Take  mg by mouth At Night As Needed for Sleep.    dofetilide (TIKOSYN) 250 MCG capsule 4/4/2019 Self No No    Take 1 capsule by mouth Every 12 (Twelve) Hours.    Patient taking differently:  Take 250 mcg by mouth 2 (Two) Times a Day. Holding 5 days before procedure    meclizine (ANTIVERT) 25 MG tablet More than a month Self Yes No    Take 25 mg by mouth 3 (Three) Times a Day As Needed for dizziness.    nitroglycerin (NITROSTAT) 0.4 MG SL tablet More than a month Self Yes No    Place 0.4 mg under the tongue Every 5 (Five) Minutes As Needed for chest pain. Take no more than 3 doses in 15 minutes.    potassium chloride (K-DUR) 10 MEQ CR tablet More than a month Self No No    Take 1 tablet by mouth Daily. Take with lasix            Current Facility-Administered Medications:   •  acetaminophen (TYLENOL) tablet 650 mg, 650 mg, Oral, Q4H PRN, Carie Marie PA  •  promethazine (PHENERGAN) injection 12.5 mg, 12.5 mg, Intravenous, Q4H PRN, Carie Marie PA  •  sodium chloride 0.9 % flush 1-10 mL, 1-10 mL, Intravenous, PRN, Carie Marie PA  •  sodium chloride 0.9 % flush 3 mL, 3 mL, Intravenous, Q12H, Carie Marie PA  •  sodium chloride 0.9 % infusion, 1 mL/kg/hr (Order-Specific), Intravenous, Continuous,  Carie Marie PA  •  vancomycin 1500 mg/500 mL 0.9% NS IVPB ([facility]), 20 mg/kg (Order-Specific), Intravenous, Once, Carie Marie PA    Social History     Socioeconomic History   • Marital status:      Spouse name: Not on file   • Number of children: Not on file   • Years of education: Not on file   • Highest education level: Not on file   Occupational History   • Occupation: retired   Tobacco Use   • Smoking status: Never Smoker   • Smokeless tobacco: Never Used   Substance and Sexual Activity   • Alcohol use: Yes     Alcohol/week: 0.6 oz     Types: 1 Glasses of wine per week     Frequency: Monthly or less     Comment: OCCASSIONAL   • Drug use: No   • Sexual activity: Defer   Social History Narrative    Denies caffeine use. Lives at home with .        Family History   Problem Relation Age of Onset   • Alzheimer's disease Mother    • Cancer Mother    • Aortic aneurysm Father    • Heart valve disorder Sister        REVIEW OF SYSTEMS:   CONST:  No weight loss, fever, chills, weakness + fatigue.   HEENT:  No visual loss, blurred vision, double vision, yellow sclerae.                   No hearing loss, congestion, sore throat.   SKIN:      No rashes, urticaria, ulcers, sores.     RESP:     No shortness of breath, hemoptysis, cough, sputum.   GI:           No anorexia, nausea, vomiting, diarrhea. No abdominal pain, melena.   :         No burning on urination, hematuria or increased frequency.  ENDO:    No diaphoresis, cold or heat intolerance. No polyuria or polydipsia.   NEURO:  No headache, dizziness, syncope, paralysis, ataxia, or parasthesias.                  No change in bowel or bladder control. No history of CVA/TIA  MUSC:    No muscle, back pain, joint pain or stiffness.   HEME:    No anemia, bleeding, bruising. No history of DVT/PE.  PSYCH:  No history of depression, anxiety    Vitals:    04/08/19 0625 04/08/19 0633 04/08/19 0635   BP:  112/66 103/73   BP Location:  Left arm Right arm  "  Patient Position:  Lying Lying   Pulse:  83    Resp:  16    Temp:  97 °F (36.1 °C)    TempSrc:  Tympanic    SpO2:  97%    Weight: 77.2 kg (170 lb 3.1 oz)     Height: 160 cm (63\")           Vital Sign Min/Max for last 24 hours  Temp  Min: 97 °F (36.1 °C)  Max: 97 °F (36.1 °C)   BP  Min: 103/73  Max: 112/66   Pulse  Min: 83  Max: 83   Resp  Min: 16  Max: 16   SpO2  Min: 97 %  Max: 97 %   No Data Recorded    No intake or output data in the 24 hours ending 04/08/19 0729          Physical Exam:  GEN: Well nourished, Well- developed  No acute distress  HEENT: Normocephalic, Atraumatic, PERRLA, moist mucous membranes  NECK: supple, NO JVD, no thyromegaly, no lymphadenopathy  CARD: S1S2 ocularly irregular no murmur, gallop, rub  LUNGS: Clear to auscultataion, normal respiratory effort  ABDOMEN: Soft, nontender, normal bowel sounds  EXTREMITIES:No gross deformities,  No clubbing, cyanosis, minimal tibial ankle edema  SKIN: Warm, dry  NEURO: No focal deficits  PSYCHIATRIC: Normal affect and mood      I personally viewed and interpreted the patient's EKG/Telemetry/lab data    Data:   Results from last 7 days   Lab Units 04/07/19  0941   WBC 10*3/mm3 6.26   HEMOGLOBIN g/dL 11.8   HEMATOCRIT % 37.5   PLATELETS 10*3/mm3 254     Results from last 7 days   Lab Units 04/07/19  0941   SODIUM mmol/L 143   POTASSIUM mmol/L 4.1   CHLORIDE mmol/L 102   CO2 mmol/L 31.0   BUN mg/dL 14   CREATININE mg/dL 0.95   GLUCOSE mg/dL 97      Results from last 7 days   Lab Units 04/07/19  0942   HEMOGLOBIN A1C % 5.80*             Results from last 7 days   Lab Units 04/07/19  0941   PROTIME Seconds 16.4*   INR  1.39*                 No intake or output data in the 24 hours ending 04/08/19 0729      Telemetry: Atrial fibrillation with V pacing      Assessment and Plan:   1.  Paroxysmal atrial fibrillation:  -Recent increased burden of atrial fibrillation despite treatment with Dickason and increasing doses of Lopressor.  Patient is symptomatic with " palpitations shortness of breath and fatigue.  She will undergo pulmonary vein ablation today with Dr. Swenson.  All risks potential complications and details of the procedure have been discussed with the patient and she is agreeable to proceeding.  CHADSVasc = 6 on chronic Eliquis with no missed doses  2.  Sick sinus syndrome:  -She has a [employer]otronik pacemaker we will plan to clear her counters after procedure today.  -Most recent device check within normal limits 2/2019  3.  Hypertension:  Controlled on current medications    Electronically signed by JOSI Lezama, 04/08/19, 7:29 AM.

## 2019-04-08 NOTE — ANESTHESIA PROCEDURE NOTES
Airway  Urgency: elective    Airway not difficult    General Information and Staff    Patient location during procedure: OR  CRNA: Shamir Watson CRNA    Indications and Patient Condition  Indications for airway management: airway protection    Preoxygenated: yes  MILS not maintained throughout  Mask difficulty assessment: 1 - vent by mask    Final Airway Details  Final airway type: endotracheal airway      Successful airway: ETT  Cuffed: yes   Successful intubation technique: direct laryngoscopy  Endotracheal tube insertion site: oral  Blade: Simi  Blade size: 3  ETT size (mm): 7.0  Cormack-Lehane Classification: grade IIa - partial view of glottis  Placement verified by: chest auscultation and capnometry   Measured from: lips  ETT to lips (cm): 20  Number of attempts at approach: 1    Additional Comments  Negative epigastric sounds, Breath sound equal bilaterally with symmetric chest rise and fall. atraumatic

## 2019-04-08 NOTE — ANESTHESIA PREPROCEDURE EVALUATION
Anesthesia Evaluation     Patient summary reviewed and Nursing notes reviewed   NPO Solid Status: > 8 hours  NPO Liquid Status: > 2 hours           Airway   Mallampati: II  TM distance: >3 FB  Neck ROM: full  Possible difficult intubation  Dental - normal exam     Pulmonary    (+) sleep apnea, decreased breath sounds,   Cardiovascular   Exercise tolerance: poor (<4 METS)    Rhythm: regular  Rate: normal    (+) pacemaker pacemaker interrogated <1 month ago, hypertension, valvular problems/murmurs MR, CAD, dysrhythmias Atrial Fib, Bradycardia, angina, PVD, hyperlipidemia,  carotid artery disease    ROS comment: MILD PULM HTN    Neuro/Psych  (+) dizziness/light headedness, psychiatric history Anxiety,     GI/Hepatic/Renal/Endo    (+)   diabetes mellitus type 2 well controlled, hypothyroidism,     ROS Comment: PREDIABETIC    Musculoskeletal     Abdominal   (+) obese,     Abdomen: soft.   Substance History      OB/GYN          Other   (+) arthritis                     Anesthesia Plan    ASA 3     general     intravenous induction   Anesthetic plan, all risks, benefits, and alternatives have been provided, discussed and informed consent has been obtained with: patient.    Plan discussed with CRNA.

## 2019-04-08 NOTE — NURSING NOTE
PRE-PVA ASSESSMENT  Kenisha Ruvalcaba 1940   [address] [facility] [address]     There is no work phone number on file.      PCP: Dr Flores    Information obtained from: [x] Medical record review    Scheduled for: PVA on 4/8/19 with Dr. Swenson  Allergies   Allergen Reactions   • Beta Adrenergic Blockers Dizziness     DIZZINESS    • Sulfa Antibiotics Rash     RASH        AFib Specific History:  AFIBTYPE: paroxysmal    CHADS-VASc Risk Assessment            5       Total Score        1 Hypertension    2 Age >/= 75    1 Vascular Disease    1 Sex: Female          Anticoagulation: Eliquis 5 mg bid   Cardioversion x 2  Failed AAD(s): tambocor, flecainide, tikosyn  Prior Ablation: No     Symptoms:   [] Palpitations:    [] Chest Discomfort:    [] Dizziness:    [] Presyncope:    [] Lightheadedness:   [] Syncope:    [x] Fatigue:    [] Other: weakness    [x] Short of Breath:     Last Echo(s):  [x] TTE Date: 10/25/16               EF: >70%           LA: 4.5 cm           VHD mild MR        Past medical History:   [] Diabetes -No                             Hemoglobin A1C   Date Value Ref Range Status   04/07/2019 5.80 (H) 4.80 - 5.60 % Final   01/17/2019 6.40 (H) 4.80 - 5.60 % Final          [x] HYPOthyroidism  [] HYPERthyroidism-No      Tx synthroid        No results found for: TSH    [x] HTN        [x] Tx norvasc, losartan            [] Heart Failure    [] CVA -No                              [] TIA -No        [] Ischemic         [] Hemorrhagic         [] Nonischemic         [] Embolic        [x] Diastolic dysfunction     [x] CAD         [] MI  -No           [x] Dyslipidemia on crestor 20 mg twice weekly     [x] Ischemic Evaluation       [] Stress Test:        [x] Heart Cath: [facility] 6/6/16 normal EF,  Minimal, nonobstructive coronary artery disease    [x] Obesity BMI 30.65       [x] BMI 30-35         [x] Weight reduction discussed with Ms. Ruvalcaba         [] Nutrition Consult            [] Declined by Ms. Ruvalcaba      [x] Depression   [x] Anxiety       Tx paxil                  Other Pertinent PMH: SSS, biotronik PM, L sided carotid artery ds-s/p L CEA,  OA    Social / Lifestyle History:   []   Tobacco: Never   [x]   Alcohol:          [x] Current: 1 glass wine weekly   []   Caffeine: No   []   Stress Issues: No    Nurse S Samuel attempted to pre screen patient 4/5 no answer.  Will assess pt for ÁNGELA and nutrition 4/9/2019.  TSH pending.      Dolly Castillo RN

## 2019-04-09 ENCOUNTER — TELEPHONE (OUTPATIENT)
Dept: CARDIOLOGY | Facility: CLINIC | Age: 79
End: 2019-04-09

## 2019-04-09 VITALS
SYSTOLIC BLOOD PRESSURE: 118 MMHG | BODY MASS INDEX: 30.16 KG/M2 | RESPIRATION RATE: 18 BRPM | TEMPERATURE: 97.8 F | HEIGHT: 63 IN | OXYGEN SATURATION: 93 % | WEIGHT: 170.19 LBS | HEART RATE: 80 BPM | DIASTOLIC BLOOD PRESSURE: 63 MMHG

## 2019-04-09 DIAGNOSIS — R06.83 SNORING: Primary | ICD-10-CM

## 2019-04-09 LAB — T4 FREE SERPL-MCNC: 1.22 NG/DL (ref 0.93–1.7)

## 2019-04-09 PROCEDURE — 63710000001 PANTOPRAZOLE 40 MG TABLET DELAYED-RELEASE: Performed by: INTERNAL MEDICINE

## 2019-04-09 PROCEDURE — 25010000002 KETOROLAC TROMETHAMINE PER 15 MG: Performed by: INTERNAL MEDICINE

## 2019-04-09 PROCEDURE — 63710000001 AMLODIPINE 5 MG TABLET: Performed by: PHYSICIAN ASSISTANT

## 2019-04-09 PROCEDURE — 93010 ELECTROCARDIOGRAM REPORT: CPT | Performed by: INTERNAL MEDICINE

## 2019-04-09 PROCEDURE — A9270 NON-COVERED ITEM OR SERVICE: HCPCS | Performed by: PHYSICIAN ASSISTANT

## 2019-04-09 PROCEDURE — 63710000001 METOPROLOL TARTRATE 100 MG TABLET: Performed by: PHYSICIAN ASSISTANT

## 2019-04-09 PROCEDURE — 63710000001 SUCRALFATE 1 G TABLET: Performed by: INTERNAL MEDICINE

## 2019-04-09 PROCEDURE — A9270 NON-COVERED ITEM OR SERVICE: HCPCS | Performed by: INTERNAL MEDICINE

## 2019-04-09 PROCEDURE — 63710000001 LOSARTAN 50 MG TABLET: Performed by: PHYSICIAN ASSISTANT

## 2019-04-09 PROCEDURE — 93005 ELECTROCARDIOGRAM TRACING: CPT | Performed by: INTERNAL MEDICINE

## 2019-04-09 PROCEDURE — 99213 OFFICE O/P EST LOW 20 MIN: CPT | Performed by: PHYSICIAN ASSISTANT

## 2019-04-09 PROCEDURE — 63710000001 LEVOTHYROXINE 25 MCG TABLET: Performed by: PHYSICIAN ASSISTANT

## 2019-04-09 PROCEDURE — 63710000001 APIXABAN 5 MG TABLET: Performed by: INTERNAL MEDICINE

## 2019-04-09 RX ADMIN — AMLODIPINE BESYLATE 5 MG: 5 TABLET ORAL at 06:45

## 2019-04-09 RX ADMIN — METOPROLOL TARTRATE 100 MG: 100 TABLET ORAL at 08:45

## 2019-04-09 RX ADMIN — LOSARTAN POTASSIUM 100 MG: 50 TABLET ORAL at 06:45

## 2019-04-09 RX ADMIN — LEVOTHYROXINE SODIUM 25 MCG: 25 TABLET ORAL at 06:45

## 2019-04-09 RX ADMIN — SUCRALFATE 1 G: 1 TABLET ORAL at 06:45

## 2019-04-09 RX ADMIN — APIXABAN 5 MG: 5 TABLET, FILM COATED ORAL at 08:45

## 2019-04-09 RX ADMIN — PANTOPRAZOLE SODIUM 40 MG: 40 TABLET, DELAYED RELEASE ORAL at 06:45

## 2019-04-09 NOTE — PROGRESS NOTES
Discharge Planning Assessment  [facility]     Patient Name: Kenisha Ruvalcaba  MRN: [id number]  Today's Date: 4/9/2019    Admit Date: 4/8/2019    Discharge Needs Assessment     Row Name 04/09/19 1023       Living Environment    Lives With  spouse    Name(s) of Who Lives With Patient  Edward-    Current Living Arrangements  home/apartment/condo Lives in [facility]    Primary Care Provided by  self    Provides Primary Care For  no one    Family Caregiver if Needed  spouse    Family Caregiver Names  Edward-    Quality of Family Relationships  helpful;involved;supportive    Able to Return to Prior Arrangements  yes       Resource/Environmental Concerns    Resource/Environmental Concerns  none    Transportation Concerns  car, none       Transition Planning    Patient/Family Anticipates Transition to  home with family    Patient/Family Anticipated Services at Transition  none    Transportation Anticipated  family or friend will provide       Discharge Needs Assessment    Concerns to be Addressed  denies needs/concerns at this time    Equipment Currently Used at Home  walker, rolling;rollator;cane, straight;bath bench;grab bar;shower chair;other (see comments) Has an elevated toilet seat. Has multiple DME due to recent hip replacement    Anticipated Changes Related to Illness  none    Equipment Needed After Discharge  none    Outpatient/Agency/Support Group Needs  outpatient therapy Pt currently going to outpatient [facility] PT for recent hip replacement.     Offered/Gave Vendor List  no        Discharge Plan     Row Name 04/09/19 1026       Plan    Plan  Home with spouse    Patient/Family in Agreement with Plan  yes    Plan Comments  Met with pt at . She lives with her  in [facility]. Has multiple DME from recent hip replacement and goes to outpatient PT at [facility]. Denies d/c needs. CM will follow.     Final Discharge Disposition Code  01 - home or self-care        Destination      No service  coordination in this encounter.      Durable Medical Equipment      No service coordination in this encounter.      Dialysis/Infusion      No service coordination in this encounter.      Home Medical Care      No service coordination in this encounter.      Therapy      No service coordination in this encounter.      Community Resources      No service coordination in this encounter.        Expected Discharge Date and Time     Expected Discharge Date Expected Discharge Time    Apr 9, 2019         Demographic Summary     Row Name 04/09/19 1022       General Information    Referral Source  admission list    Preferred Language  English     Used During This Interaction  no    General Information Comments  PCP is Lana Flores       Contact Information    Permission Granted to Share Info With          Functional Status     Row Name 04/09/19 1022       Functional Status    Usual Activity Tolerance  moderate    Current Activity Tolerance  moderate       Functional Status, IADL    Medications  independent    Meal Preparation  independent    Housekeeping  independent    Laundry  independent    Shopping  independent    IADL Comments   can assist if needed.        Employment/    Employment/ Comments  Has Medicare [employer] and [employer] supplement        Psychosocial    No documentation.       Abuse/Neglect    No documentation.       Legal    No documentation.       Substance Abuse    No documentation.       Patient Forms    No documentation.           Michelle Munguia

## 2019-04-09 NOTE — PLAN OF CARE
Problem: Patient Care Overview  Goal: Plan of Care Review  Outcome: Ongoing (interventions implemented as appropriate)  Pt confused post op. Sheaths pulled c/o complication. Patient confused with mood swings and anxiety- she attributes to anesthesia.    04/08/19 2016   Coping/Psychosocial   Plan of Care Reviewed With patient   Plan of Care Review   Progress no change     Goal: Individualization and Mutuality  Outcome: Ongoing (interventions implemented as appropriate)    Goal: Discharge Needs Assessment  Outcome: Ongoing (interventions implemented as appropriate)    Goal: Interprofessional Rounds/Family Conf  Outcome: Ongoing (interventions implemented as appropriate)      Problem: Fall Risk (Adult)  Goal: Identify Related Risk Factors and Signs and Symptoms  Outcome: Ongoing (interventions implemented as appropriate)    Goal: Absence of Fall  Outcome: Ongoing (interventions implemented as appropriate)

## 2019-04-09 NOTE — NURSING NOTE
Ablation Nurse Navigator    Pt s/p PVA  yesterday. Doing well this AM. No C/O.  HR 80 .  Has had breakfast without nausea, has voided without difficulty and has ambulated without dizziness or groin issue.  Going home later today.      TSH 4.9, free T4 pending.  Will forward results when josé to PCP Dr Flores.      Atrial fibrillation / Atrial flutter:  Quality Measure    CHADS-VASc Score:5    Anticoagulation for CHADS-VASc >/=2 Eliquis 5 mg bid     Statin Therapy on crestor 20 mg     Reviewed D/C information with patient, particularly normal post procedural expectations and when to call.  Ablation discharge instruction handout left for future reference.  Verbalized understanding.  Will follow up in the AFib Clinic in one month and with Dr. Swenson in 3 months.  I gave her my contact information and encouraged to call me with questions or concerns in the interim.    Dolly Castillo RN      04/09/19  8:29 AM

## 2019-04-09 NOTE — PROGRESS NOTES
" [facility] - Progress Note/DISCHARGE NOTE    Kenisha Ruvalcaba  1940  [id number]    04/09/19, 9:12 AM    Chief Complaint: Following for AFib, s/p PVA    Subjective:   Doing well.  No complaints of pain, palpitations, dyspnea.  HR has remained in the 80s, A Paced.  Ate bfast without dysphagia.  Biotronik Rep did come this morning and reset counters.    Confusion last night with anesthesia. Has improved some this morning.   is at bedside.  I personally removed bandages - stable without bleeding.          Review of Systems:  Pertinent positives are listed above and in physical exam.  All others have been reviewed and are negative.      amLODIPine 5 mg Oral QAM   apixaban 5 mg Oral Q12H   aspirin 81 mg Oral Nightly   ketorolac 15 mg Intravenous Q8H   levothyroxine 25 mcg Oral QAM   losartan 100 mg Oral QAM   metoprolol tartrate 100 mg Oral BID   pantoprazole 40 mg Oral Q AM   PARoxetine 10 mg Oral Nightly   [START ON 4/10/2019] rosuvastatin 20 mg Oral Once per day on Wed Sat   sucralfate 1 g Oral 4x Daily AC & at Bedtime       Objective:  Vitals:   height is 160 cm (63\") and weight is 77.2 kg (170 lb 3.1 oz). Her oral temperature is 97.8 °F (36.6 °C). Her blood pressure is 118/63 and her pulse is 80. Her respiration is 18 and oxygen saturation is 93%.     Intake/Output Summary (Last 24 hours) at 4/9/2019 0912  Last data filed at 4/8/2019 1702  Gross per 24 hour   Intake 2600 ml   Output 2425 ml   Net 175 ml       Physical Exam:  General:  WN, NAD, A and O x3.  CV:  Normal S1,S2. No murmur, rub, or gallop.  Resp:  CTA Kimani, equal, non-labored.  Abd:  Soft, + BS, no organomegaly. Non-tender to palpation.  Extrem:  No edema BLE, 2+ pedal/PT pulses.   Bilateral groins and Right sided neck stable.           Results from last 7 days   Lab Units 04/07/19  0941   WBC 10*3/mm3 6.26   HEMOGLOBIN g/dL 11.8   HEMATOCRIT % 37.5   PLATELETS 10*3/mm3 254     Results from last 7 days   Lab Units " 04/07/19  0941   SODIUM mmol/L 143   POTASSIUM mmol/L 4.1   CHLORIDE mmol/L 102   CO2 mmol/L 31.0   BUN mg/dL 14   CREATININE mg/dL 0.95   CALCIUM mg/dL 9.7   GLUCOSE mg/dL 97     Results from last 7 days   Lab Units 04/07/19  0941   INR  1.39*     Results from last 7 days   Lab Units 04/07/19  0941   TSH mIU/mL 4.980*               Tele:  NSR/A Paced    Assessment and Plan:  1.  Paroxysmal atrial fibrillation:  -Recent increased burden of atrial fibrillation despite treatment with Tikosyn and increasing doses of Lopressor.  Patient is symptomatic with palpitations shortness of breath and fatigue.    -  She is S/P  pulmonary vein ablation today with Dr. Swenson.    -  CHADSVasc = 6 on chronic Eliquis with no missed doses  -  DC home today in stable condition with follow up 1 month in Afib clinic and 3 months with Dr. Swenson.  See AVS for medications.  TSH noted.  Free T4 has been ordered and will forwarded on to PCP by Dolly Castillo RN (AFib Navigator).      2.  Sick sinus syndrome:  -She has a Biotronik pacemaker we cleared her counters today.  -Most recent device check within normal limits 2/2019    3.  Essential Hypertension:  Controlled on current medications        I discussed the patients findings and my recommendations with the patient, any present family members, and the nursing staff.        JOSI Cr functioning independently.  04/09/19, 9:12 AM

## 2019-04-09 NOTE — TELEPHONE ENCOUNTER
TSH and free T4 faxed to Luly at Dr Medrano office.  Follow up appt with PCP 4/19/2019.    Dolly Castillo RN

## 2019-04-16 ENCOUNTER — TELEPHONE (OUTPATIENT)
Dept: CARDIOLOGY | Facility: CLINIC | Age: 79
End: 2019-04-16

## 2019-04-16 ENCOUNTER — CLINICAL SUPPORT NO REQUIREMENTS (OUTPATIENT)
Dept: CARDIOLOGY | Facility: CLINIC | Age: 79
End: 2019-04-16

## 2019-04-16 DIAGNOSIS — I48.0 PAROXYSMAL ATRIAL FIBRILLATION (HCC): Primary | ICD-10-CM

## 2019-04-16 DIAGNOSIS — I49.5 SSS (SICK SINUS SYNDROME) (HCC): ICD-10-CM

## 2019-04-16 PROCEDURE — 93296 REM INTERROG EVL PM/IDS: CPT | Performed by: INTERNAL MEDICINE

## 2019-04-16 PROCEDURE — 93294 REM INTERROG EVL PM/LDLS PM: CPT | Performed by: INTERNAL MEDICINE

## 2019-05-15 ENCOUNTER — HOSPITAL ENCOUNTER (OUTPATIENT)
Dept: CARDIOLOGY | Facility: HOSPITAL | Age: 79
Discharge: HOME | End: 2019-05-15
Admitting: NURSE PRACTITIONER

## 2019-05-15 ENCOUNTER — OFFICE VISIT (OUTPATIENT)
Dept: CARDIOLOGY | Facility: HOSPITAL | Age: 79
End: 2019-05-15

## 2019-05-15 VITALS
HEIGHT: 63 IN | RESPIRATION RATE: 16 BRPM | TEMPERATURE: 97.6 F | DIASTOLIC BLOOD PRESSURE: 59 MMHG | SYSTOLIC BLOOD PRESSURE: 116 MMHG | WEIGHT: 163 LBS | OXYGEN SATURATION: 96 % | HEART RATE: 97 BPM | BODY MASS INDEX: 28.88 KG/M2

## 2019-05-15 DIAGNOSIS — I34.0 MITRAL VALVE INSUFFICIENCY, UNSPECIFIED ETIOLOGY: ICD-10-CM

## 2019-05-15 DIAGNOSIS — I48.0 PAROXYSMAL ATRIAL FIBRILLATION (HCC): ICD-10-CM

## 2019-05-15 DIAGNOSIS — I48.0 PAROXYSMAL ATRIAL FIBRILLATION (HCC): Primary | ICD-10-CM

## 2019-05-15 DIAGNOSIS — I49.5 SSS (SICK SINUS SYNDROME) (HCC): ICD-10-CM

## 2019-05-15 DIAGNOSIS — I10 ESSENTIAL HYPERTENSION: ICD-10-CM

## 2019-05-15 DIAGNOSIS — I51.89 DIASTOLIC DYSFUNCTION: ICD-10-CM

## 2019-05-15 PROCEDURE — 93010 ELECTROCARDIOGRAM REPORT: CPT | Performed by: INTERNAL MEDICINE

## 2019-05-15 PROCEDURE — 93005 ELECTROCARDIOGRAM TRACING: CPT | Performed by: NURSE PRACTITIONER

## 2019-05-15 PROCEDURE — 99214 OFFICE O/P EST MOD 30 MIN: CPT | Performed by: NURSE PRACTITIONER

## 2019-05-15 NOTE — PROGRESS NOTES
[facility]  Heart and Valve Center      Encounter Date:05/15/2019     Kenisha Ruvalcaba  [address] [facility] [address]  [unfilled]    1940    Lana Flores MD Judmendel ACOSTA Kaufman is a 79 y.o. female.      Subjective:     Chief Complaint:  S/p PVA     HPI   Patient is a 79-year-old female with past medical history significant for paroxysmal atrial fibrillation, sick sinus syndrome status post permanent pacemaker, mild coronary artery disease, carotid artery stenosis status post left CEA, mitral regurgitation, hypertension and dyslipidemia who presents to the A. fib clinic to follow-up after recent PVA.  Patient underwent successful RFA of pulmonary veins, atrial tachycardia, right atrial isthmus dependent flutter on 4/8/19. She is doing well. Occasional palpitations but symptoms are improving every day.     Patient Active Problem List   Diagnosis   • Paroxysmal atrial fibrillation (CMS/HCC)   • Abnormal stress test   • Essential hypertension   • Hyperlipidemia LDL goal <70   • Coronary artery disease involving native coronary artery of native heart with angina pectoris (CMS/HCC)   • Left-sided carotid artery disease (CMS/HCC)   • Vertigo   • Anxiety   • Mitral regurgitation   • Coronary artery disease   • H/O echocardiogram   • History of cardioversion   • SSS (sick sinus syndrome) (CMS/HCC)   • History of Doppler ultrasound   • Dyslipidemia   • Tachycardia-bradycardia syndrome (CMS/HCC)   • Chronic hip pain, left   • Primary osteoarthritis of left hip   • Status post total replacement of left hip   • Hypothyroid   • Hypokalemia, replaced   • Prediabetes   • Leukocytosis, mild, likely reactive   • Acute blood loss anemia, mild, asymptomatic   • Acute postoperative pain       Past Medical History:   Diagnosis Date   • AF (atrial fibrillation) (CMS/HCC)    • Anesthesia complication     HARD TO WAKE   • Anxiety    • Arrhythmia     atrial fibrillation   • Arthritis     Left hip   •  Carotid artery occlusion     right   • Chronic hip pain, left 1/7/2019   • Coronary artery disease    • Dyslipidemia    • Elevated cholesterol    • History of cardioversion 12/03/2010    initiation of Tambocor and subsequent external cardioversion.   • Hyperlipidemia    • Hypertension    • Hypothyroidism    • ÁNGELA (obstructive sleep apnea)    • Paroxysmal atrial fibrillation (CMS/HCC)    • SSS (sick sinus syndrome) (CMS/HCC)    • Valvular heart disease    • Vertigo    • Wears glasses        Past Surgical History:   Procedure Laterality Date   • BREAST BIOPSY Left     MARKER IN PLACE   • CARDIAC CATHETERIZATION Left 6/16/2016    Procedure: Cardiac catheterization;  Surgeon: Chuck Clark MD;  Location: [email or web address] CATH INVASIVE LOCATION;  Service:    • CARDIAC ELECTROPHYSIOLOGY PROCEDURE N/A 2/17/2017    Procedure: Pacemaker DC new;  Surgeon: Darryl Swenson MD;  Location: [email or web address] EP INVASIVE LOCATION;  Service:    • CARDIAC ELECTROPHYSIOLOGY PROCEDURE N/A 4/8/2019    Procedure: Ablation atrial fibrillation, hold Tikosyn for 5 days stay on metoprolol.;  Surgeon: Darryl Swenson MD;  Location: [email or web address] EP INVASIVE LOCATION;  Service: Cardiovascular   • CAROTID ENDARTERECTOMY Left    • CATARACT EXTRACTION Bilateral    • COLONOSCOPY      every 5-10 years. does not plan to have anymore   • PACEMAKER IMPLANTATION     • TOTAL HIP ARTHROPLASTY Left 1/30/2019    Procedure: TOTAL HIP ARTHROPLASTY LEFT;  Surgeon: Freddie Vale MD;  Location:  [facility];  Service: Orthopedics   • TUBAL ABDOMINAL LIGATION         Family History   Problem Relation Age of Onset   • Alzheimer's disease Mother    • Cancer Mother    • Aortic aneurysm Father    • Heart valve disorder Sister        Social History     Socioeconomic History   • Marital status:      Spouse name: Not on file   • Number of children: Not on file   • Years of education: Not on file   • Highest education level: Not on file   Occupational History   •  Occupation: retired   Tobacco Use   • Smoking status: Never Smoker   • Smokeless tobacco: Never Used   Substance and Sexual Activity   • Alcohol use: Yes     Alcohol/week: 0.6 oz     Types: 1 Glasses of wine per week     Frequency: Monthly or less     Comment: OCCASSIONAL   • Drug use: No   • Sexual activity: Defer   Social History Narrative    Denies caffeine use. Lives at home with .        Allergies   Allergen Reactions   • Beta Adrenergic Blockers Dizziness     DIZZINESS    • Sulfa Antibiotics Rash     RASH          Current Outpatient Medications:   •  amLODIPine (NORVASC) 5 MG tablet, Take 5 mg by mouth Every Morning., Disp: , Rfl:   •  apixaban (ELIQUIS) 5 MG tablet tablet, Take 1 tablet by mouth Every 12 (Twelve) Hours, Disp: 60 tablet, Rfl: 6  •  aspirin 81 MG EC tablet, Take 81 mg by mouth Every Night., Disp: , Rfl:   •  cholecalciferol (VITAMIN D3) 1000 UNITS tablet, Take 1,000 Units by mouth Daily., Disp: , Rfl:   •  coenzyme Q10 100 MG capsule, Take 100 mg by mouth Daily., Disp: , Rfl:   •  furosemide (LASIX) 20 MG tablet, Take 1 tablet by mouth Daily. Take as needed for edema. (Patient taking differently: Take 20 mg by mouth Daily As Needed. Take as needed for edema.), Disp: 90 tablet, Rfl: 3  •  levothyroxine (SYNTHROID, LEVOTHROID) 25 MCG tablet, Take 25 mcg by mouth Every Morning., Disp: , Rfl:   •  losartan (COZAAR) 100 MG tablet, Take 1 tablet by mouth Daily. (Patient taking differently: Take 100 mg by mouth Every Morning.), Disp: 30 tablet, Rfl: 6  •  meclizine (ANTIVERT) 25 MG tablet, Take 25 mg by mouth 3 (Three) Times a Day As Needed for dizziness., Disp: , Rfl:   •  metoprolol tartrate (LOPRESSOR) 100 MG tablet, Take 1 tablet by mouth 2 (Two) Times a Day., Disp: 60 tablet, Rfl: 11  •  nitroglycerin (NITROSTAT) 0.4 MG SL tablet, Place 0.4 mg under the tongue Every 5 (Five) Minutes As Needed for chest pain. Take no more than 3 doses in 15 minutes., Disp: , Rfl:   •  PARoxetine (PAXIL) 20  MG tablet, Take 10 mg by mouth Every Night., Disp: , Rfl:   •  potassium chloride (K-DUR) 10 MEQ CR tablet, Take 1 tablet by mouth Daily. Take with lasix, Disp: 30 tablet, Rfl: 11  •  rosuvastatin (CRESTOR) 20 MG tablet, Take 20 mg by mouth 2 (Two) Times a Week. Wed and Sat, Disp: , Rfl:   •  traZODone (DESYREL) 50 MG tablet, Take  mg by mouth At Night As Needed for Sleep., Disp: , Rfl:   •  acetaminophen (TYLENOL) 325 MG tablet, Take 2 tablets by mouth Every 4 (Four) Hours As Needed for Mild Pain ., Disp: , Rfl:     The following portions of the patient's history were reviewed today and updated as appropriate: allergies, current medications, past family history, past medical history, past social history, past surgical history and problem list     Review of Systems   Constitution: Positive for weakness and malaise/fatigue. Negative for chills and fever.   HENT: Negative.    Eyes: Negative.    Cardiovascular: Positive for dyspnea on exertion, irregular heartbeat and leg swelling. Negative for chest pain, claudication, cyanosis, near-syncope, orthopnea, palpitations, paroxysmal nocturnal dyspnea and syncope.   Respiratory: Negative for cough, shortness of breath and snoring.    Endocrine: Negative.    Hematologic/Lymphatic: Does not bruise/bleed easily.   Skin: Negative for poor wound healing.   Musculoskeletal: Negative.    Gastrointestinal: Positive for diarrhea. Negative for abdominal pain, heartburn, hematemesis, melena, nausea and vomiting.   Genitourinary: Negative.  Negative for hematuria.   Neurological: Positive for excessive daytime sleepiness.   Psychiatric/Behavioral: Positive for memory loss.   Allergic/Immunologic: Negative.        Objective:     Vitals:    05/15/19 1447 05/15/19 1452 05/15/19 1453   BP: 115/56 116/57 116/59   BP Location: Right arm Left arm Right arm   Patient Position: Sitting Sitting Standing   Pulse: 91 90 97   Resp: 16     Temp: 97.6 °F (36.4 °C)     TempSrc: Temporal    "  SpO2: 96%     Weight: 73.9 kg (163 lb)     Height: 160 cm (63\")         Physical Exam   Constitutional: She is oriented to person, place, and time. She appears well-developed and well-nourished. No distress.   HENT:   Head: Normocephalic.   Eyes: Conjunctivae are normal. Pupils are equal, round, and reactive to light.   Neck: Neck supple. No JVD present. No thyromegaly present.   Cardiovascular: Normal rate, regular rhythm and intact distal pulses. Exam reveals no gallop and no friction rub.   Murmur (PREET II) heard.  Pulmonary/Chest: Effort normal and breath sounds normal. No respiratory distress. She has no wheezes. She has no rales. She exhibits no tenderness.   Abdominal: Soft. Bowel sounds are normal.   Musculoskeletal: Normal range of motion. She exhibits edema (1+ BLE).   Neurological: She is alert and oriented to person, place, and time.   Skin: Skin is warm and dry.   Psychiatric: She has a normal mood and affect. Her behavior is normal. Thought content normal.   Vitals reviewed.      Lab and Diagnostic Review:  4/8/19  1.  Successful diagnostic electrophysiology study with radiofrequency ablation of the pulmonary veins.  2.  Successful radiofrequency catheter ablation of an atrial tachycardia.  3.  Successful radiofrequency catheter ablation of right atrial isthmus dependent flutter.  4.  Successful radiofrequency catheter ablation of focal atrial fibrillation and long highly fractionated left atrial signals.  5.  Successful transseptal catheterization to the left atrium x2.  6.  Successful intracardiac ultrasound monitoring.  7.  Successful esophageal temperature monitoring  EKG: A-paced    Assessment and Plan:   1. Paroxysmal atrial fibrillation (CMS/HCC)  S/p PVA  Continue eliquis  Continue BB  - ECG 12 Lead; Future    2. Essential hypertension  Well controlled    3. SSS (sick sinus syndrome) (CMS/HCC)  S/p PPM    4. Mitral regurgitation  Mild per echo in 2016    5. Diastolic dysfunction  Grade " II  Lasix PRN    Keep follow up with Dr. Leela LOUIE with [facility] PR[facility]    It has been a pleasure to participate in the care of this patient.  Patient was instructed to call the Heart and Valve Center with any questions, concerns, or worsening symptoms.    *Please note that portions of this note were completed with a voice recognition program. Efforts were made to edit the dictations, but occasionally words are mistranscribed.

## 2019-05-16 ENCOUNTER — OFFICE VISIT (OUTPATIENT)
Dept: ORTHOPEDIC SURGERY | Facility: CLINIC | Age: 79
End: 2019-05-16

## 2019-05-16 VITALS — WEIGHT: 165.12 LBS | BODY MASS INDEX: 29.26 KG/M2 | HEART RATE: 90 BPM | OXYGEN SATURATION: 98 % | HEIGHT: 63 IN

## 2019-05-16 DIAGNOSIS — Z96.642 STATUS POST TOTAL REPLACEMENT OF LEFT HIP: Primary | ICD-10-CM

## 2019-05-16 PROCEDURE — 99212 OFFICE O/P EST SF 10 MIN: CPT | Performed by: ORTHOPAEDIC SURGERY

## 2019-05-16 NOTE — PROGRESS NOTES
Orthopaedic Clinic Note: Hip Established Patient    Chief Complaint   Patient presents with   • Left Hip - Follow-up     Left Total Hip Arthroplasty 01/30/2019  2 month f/u        HPI    It has been 2  month(s) since Ms. Ruvalcaba's last visit. She returns to clinic today for follow-up left total hip arthroplasty.  Rates her pain 0/10 on the pain scale.  She is ambulating with a cane as a mental crutch only.  She is able to ambulate without any assistive device.  She has completed physical therapy.  Overall she is doing much better.  She states she is able to do all daily activities without any limitations.  She is happy with her outcome.    Past Medical History:   Diagnosis Date   • AF (atrial fibrillation) (CMS/HCC)    • Anesthesia complication     HARD TO WAKE   • Anxiety    • Arrhythmia     atrial fibrillation   • Arthritis     Left hip   • Carotid artery occlusion     right   • Chronic hip pain, left 1/7/2019   • Coronary artery disease    • Dyslipidemia    • Elevated cholesterol    • History of cardioversion 12/03/2010    initiation of Tambocor and subsequent external cardioversion.   • Hyperlipidemia    • Hypertension    • Hypothyroidism    • ÁNGELA (obstructive sleep apnea)    • Paroxysmal atrial fibrillation (CMS/HCC)    • SSS (sick sinus syndrome) (CMS/HCC)    • Valvular heart disease    • Vertigo    • Wears glasses       Past Surgical History:   Procedure Laterality Date   • BREAST BIOPSY Left     MARKER IN PLACE   • CARDIAC CATHETERIZATION Left 6/16/2016    Procedure: Cardiac catheterization;  Surgeon: Chuck Clark MD;  Location:  [facility] CATH INVASIVE LOCATION;  Service:    • CARDIAC ELECTROPHYSIOLOGY PROCEDURE N/A 2/17/2017    Procedure: Pacemaker DC new;  Surgeon: Darryl Swenson MD;  Location:  [facility] EP INVASIVE LOCATION;  Service:    • CARDIAC ELECTROPHYSIOLOGY PROCEDURE N/A 4/8/2019    Procedure: Ablation atrial fibrillation, hold Tikosyn for 5 days stay on metoprolol.;  Surgeon: Leela  Darryl MOHR MD;  Location: [facility] EP INVASIVE LOCATION;  Service: Cardiovascular   • CAROTID ENDARTERECTOMY Left    • CATARACT EXTRACTION Bilateral    • COLONOSCOPY      every 5-10 years. does not plan to have anymore   • PACEMAKER IMPLANTATION     • TOTAL HIP ARTHROPLASTY Left 1/30/2019    Procedure: TOTAL HIP ARTHROPLASTY LEFT;  Surgeon: Freddie Vale MD;  Location: [facility] OR;  Service: Orthopedics   • TUBAL ABDOMINAL LIGATION        Family History   Problem Relation Age of Onset   • Alzheimer's disease Mother    • Cancer Mother    • Aortic aneurysm Father    • Heart valve disorder Sister      Social History     Socioeconomic History   • Marital status:      Spouse name: Not on file   • Number of children: Not on file   • Years of education: Not on file   • Highest education level: Not on file   Occupational History   • Occupation: retired   Tobacco Use   • Smoking status: Never Smoker   • Smokeless tobacco: Never Used   Substance and Sexual Activity   • Alcohol use: Yes     Alcohol/week: 0.6 oz     Types: 1 Glasses of wine per week     Frequency: Monthly or less     Comment: OCCASSIONAL   • Drug use: No   • Sexual activity: Defer   Social History Narrative    Denies caffeine use. Lives at home with .       Current Outpatient Medications on File Prior to Visit   Medication Sig Dispense Refill   • acetaminophen (TYLENOL) 325 MG tablet Take 2 tablets by mouth Every 4 (Four) Hours As Needed for Mild Pain .     • amLODIPine (NORVASC) 5 MG tablet Take 5 mg by mouth Every Morning.     • apixaban (ELIQUIS) 5 MG tablet tablet Take 1 tablet by mouth Every 12 (Twelve) Hours 60 tablet 6   • aspirin 81 MG EC tablet Take 81 mg by mouth Every Night.     • cholecalciferol (VITAMIN D3) 1000 UNITS tablet Take 1,000 Units by mouth Daily.     • coenzyme Q10 100 MG capsule Take 100 mg by mouth Daily.     • furosemide (LASIX) 20 MG tablet Take 1 tablet by mouth Daily. Take as needed for edema. (Patient taking  differently: Take 20 mg by mouth Daily As Needed. Take as needed for edema.) 90 tablet 3   • levothyroxine (SYNTHROID, LEVOTHROID) 25 MCG tablet Take 25 mcg by mouth Every Morning.     • losartan (COZAAR) 100 MG tablet Take 1 tablet by mouth Daily. (Patient taking differently: Take 100 mg by mouth Every Morning.) 30 tablet 6   • meclizine (ANTIVERT) 25 MG tablet Take 25 mg by mouth 3 (Three) Times a Day As Needed for dizziness.     • metoprolol tartrate (LOPRESSOR) 100 MG tablet Take 1 tablet by mouth 2 (Two) Times a Day. 60 tablet 11   • nitroglycerin (NITROSTAT) 0.4 MG SL tablet Place 0.4 mg under the tongue Every 5 (Five) Minutes As Needed for chest pain. Take no more than 3 doses in 15 minutes.     • PARoxetine (PAXIL) 20 MG tablet Take 10 mg by mouth Every Night.     • potassium chloride (K-DUR) 10 MEQ CR tablet Take 1 tablet by mouth Daily. Take with lasix 30 tablet 11   • rosuvastatin (CRESTOR) 20 MG tablet Take 20 mg by mouth 2 (Two) Times a Week. Wed and Sat     • traZODone (DESYREL) 50 MG tablet Take  mg by mouth At Night As Needed for Sleep.       No current facility-administered medications on file prior to visit.       Allergies   Allergen Reactions   • Beta Adrenergic Blockers Dizziness     DIZZINESS    • Sulfa Antibiotics Rash     RASH         Review of Systems   Constitutional: Positive for fatigue.   HENT: Positive for postnasal drip and sneezing.    Eyes: Negative.    Respiratory: Positive for shortness of breath.    Cardiovascular: Positive for leg swelling.   Gastrointestinal: Negative.    Endocrine: Negative.    Genitourinary: Negative.    Musculoskeletal: Positive for neck stiffness.        Left Total Hip Arthoplasty f/u   Skin: Negative.    Allergic/Immunologic: Negative.    Neurological: Positive for weakness.   Hematological: Bruises/bleeds easily.   Psychiatric/Behavioral: Negative.         The patient's Review of Systems was personally reviewed and confirmed as accurate.    Physical  "Exam  Pulse 90, height 160 cm (62.99\"), weight 74.9 kg (165 lb 2 oz), SpO2 98 %, not currently breastfeeding.    Body mass index is 29.26 kg/m².    GENERAL APPEARANCE: awake, alert, oriented, in no acute distress and well developed, well nourished  LUNGS:  breathing nonlabored  EXTREMITIES: no clubbing, cyanosis  PERIPHERAL PULSES: palpable dorsalis pedis and posterior tibial pulses bilaterally.    GAIT:  Normal            Hip Exam:  Left    RANGE OF MOTION:  EXTENSION/FLEXION:  normal (0-110 degrees)  IR (at 90 degrees of flexion):  20  ER (at 90 degrees of flexion):  40  PAIN WITH HIP MOTION:  no  PAIN WITH LOGROLL:  no     STINCHFIELD TEST: negative    STRENGTH:  ABDUCTOR:  5/5  ADDUCTOR:  5/5  HIP FLEXION:  5/5    GREATER TROCHANTER BURSAL PAIN:  yes    SENSATION TO LIGHT TOUCH:  DEEP PERONEAL/SUPERFICIAL PERONEAL/SURAL/SAPHENOUS/TIBIAL:   intact    EDEMA:  no  ERYTHEMA:  no  WOUNDS/INCISIONS:  yes, Well-healed posterior lateral hip incision  _________________________________________________________________  _________________________________________________________________    RADIOGRAPHIC FINDINGS:   Indication: Status post left total hip arthroplasty    Comparison: Todays xrays were compared to previous xrays from 3/14/2019    AP pelvis: Left: Demonstrate a well positioned total hip without evidence of wear, loosening, fracture or osteolysis, femoral head is concentrically reduced within the acetabulum and No significant changes compared to prior radiographs.      Assessment/Plan:   Diagnosis Plan   1. Status post total replacement of left hip  XR Pelvis 1 or 2 View     Patient is doing well 3 and half month status post left total hip arthroplasty.  I recommend the patient continue activity as tolerated without restrictions.  I will see her back in 3 months for repeat assessment with x-ray AP pelvis.    Freddie Vale MD  05/16/19  1:04 PM  "

## 2019-07-10 ENCOUNTER — OFFICE VISIT (OUTPATIENT)
Dept: CARDIOLOGY | Facility: CLINIC | Age: 79
End: 2019-07-10

## 2019-07-10 VITALS
WEIGHT: 157.8 LBS | DIASTOLIC BLOOD PRESSURE: 62 MMHG | OXYGEN SATURATION: 97 % | BODY MASS INDEX: 27.96 KG/M2 | HEART RATE: 96 BPM | SYSTOLIC BLOOD PRESSURE: 110 MMHG

## 2019-07-10 DIAGNOSIS — I49.5 SSS (SICK SINUS SYNDROME) (HCC): ICD-10-CM

## 2019-07-10 DIAGNOSIS — I48.0 PAROXYSMAL ATRIAL FIBRILLATION (HCC): Primary | ICD-10-CM

## 2019-07-10 DIAGNOSIS — I10 ESSENTIAL HYPERTENSION: ICD-10-CM

## 2019-07-10 PROCEDURE — 99213 OFFICE O/P EST LOW 20 MIN: CPT | Performed by: INTERNAL MEDICINE

## 2019-07-10 PROCEDURE — 93280 PM DEVICE PROGR EVAL DUAL: CPT | Performed by: INTERNAL MEDICINE

## 2019-07-10 NOTE — PROGRESS NOTES
Kenisha Ruvalcaba  1940    There is no work phone number on file.      07/10/2019    [facility] CARDIOLOGY     Lana Flores MD  [address] [address]    Chief Complaint   Patient presents with   • Atrial Fibrillation       Problem List:   1. Paroxysmal atrial fibrillation:  a. Echocardiogram, 09/10/2010: Left atrium 4.6. Moderate left atrial enlargement. Ejection fraction 55%. Severe right atrial enlargement and 2+ tricuspid regurgitation with RVSP 37 mmHg.  b. Coumadin initiation, 09/07/2010.  c. Initiation of Tambocor and subsequent external cardioversion, 12/03/2010.  d. Echocardiogram, 12/03/2010: Moderate mitral regurgitation, moderate tricuspid regurgitation, normal left ventricular size and function, negative bubble study.   e. External cardioversion to sinus rhythm, December 2010.  f. CHADS-VASc score equals 4 to 6, on Eliquis  g. Echocardiogram 10/2016: EF >70%, grade II diastolic dysfunction, LA cavity mildly dilated, mild MR, RVSP 45-55 mmHg  h. Recurrent atrial fibrillation despite Flecainide, flecainide discontinued 09/2018  i. Tikosyn initiation 09/2018, Metoprolol increased to 100mg BID 1/19  j.  S/p successful RFA of the pulmonary veins, atrial tachycardia, right atrial isthmus dependent flutter, and RFA of focal AF and long highly fractionated LA signals, 4/8/19  2. Sick sinus syndrome:   a. Patient was hospitalized at [facility], 04/26/2016 through 04/28/2016 with a 30-day Event monitor.   b. Event recorder 12/16/16: 9% AF, 3.3 sec pauses, HR range from 37 bpm - 151 bpm  c. PM implant 2/17/17- BTK   3. Mild coronary artery disease on cardiac catheterization, 02/06/2013. And LHC on 6/6/16, normal EF  4. Carotid artery stenosis, status post left CEA:  a. Carotid ultrasound, March 2013, with no significant stenosis.  5. Valvular heart disease:  a. Echo, 01/19/2013: Left ventricular ejection fraction 55% to 60%, mild to  moderate mitral regurgitation, RVSP 46 mmHg.   6. Hypertension.   7. Dyslipidemia.   8. Vertigo.   9. Anxiety.   10. Surgical history:  a. Tubal ligation.   b. Left CEA, March 2008.    Allergies  Allergies   Allergen Reactions   • Beta Adrenergic Blockers Dizziness     DIZZINESS    • Sulfa Antibiotics Rash     RASH        Current Medications    Current Outpatient Medications:   •  acetaminophen (TYLENOL) 325 MG tablet, Take 2 tablets by mouth Every 4 (Four) Hours As Needed for Mild Pain ., Disp: , Rfl:   •  amLODIPine (NORVASC) 5 MG tablet, Take 5 mg by mouth Every Morning., Disp: , Rfl:   •  apixaban (ELIQUIS) 5 MG tablet tablet, Take 1 tablet by mouth Every 12 (Twelve) Hours, Disp: 60 tablet, Rfl: 6  •  aspirin 81 MG EC tablet, Take 81 mg by mouth Every Night., Disp: , Rfl:   •  cholecalciferol (VITAMIN D3) 1000 UNITS tablet, Take 1,000 Units by mouth Daily., Disp: , Rfl:   •  coenzyme Q10 100 MG capsule, Take 100 mg by mouth Daily., Disp: , Rfl:   •  furosemide (LASIX) 20 MG tablet, Take 1 tablet by mouth Daily. Take as needed for edema. (Patient taking differently: Take 20 mg by mouth Daily As Needed. Take as needed for edema.), Disp: 90 tablet, Rfl: 3  •  levothyroxine (SYNTHROID, LEVOTHROID) 25 MCG tablet, Take 25 mcg by mouth Every Morning., Disp: , Rfl:   •  losartan (COZAAR) 100 MG tablet, Take 1 tablet by mouth Daily. (Patient taking differently: Take 100 mg by mouth Every Morning.), Disp: 30 tablet, Rfl: 6  •  meclizine (ANTIVERT) 25 MG tablet, Take 25 mg by mouth 3 (Three) Times a Day As Needed for dizziness., Disp: , Rfl:   •  metoprolol tartrate (LOPRESSOR) 100 MG tablet, Take 1 tablet by mouth 2 (Two) Times a Day., Disp: 60 tablet, Rfl: 11  •  nitroglycerin (NITROSTAT) 0.4 MG SL tablet, Place 0.4 mg under the tongue Every 5 (Five) Minutes As Needed for chest pain. Take no more than 3 doses in 15 minutes., Disp: , Rfl:   •  PARoxetine (PAXIL) 20 MG tablet, Take 10 mg by mouth Every Night., Disp: , Rfl:    •  potassium chloride (K-DUR) 10 MEQ CR tablet, Take 1 tablet by mouth Daily. Take with lasix, Disp: 30 tablet, Rfl: 11  •  rosuvastatin (CRESTOR) 20 MG tablet, Take 20 mg by mouth 2 (Two) Times a Week. Wed and Sat, Disp: , Rfl:     History of Present Illness   HPI    Pt presents for follow up of atrial fibrillation s/p PVA 04/2019. Since we last saw the pt, pt denies any AF episodes, SOB, CP, LH, and dizziness.  Had occasional palpitations after her ablation but has improved dramatically.  Notes improved energy since ablation and overall feels quite well.  Denies any hospitalizations, ER visits, bleeding, or TIA/CVA symptoms.     ROS:  General:  Denies fatigue, weight gain or loss  Cardiovascular:  Denies CP, PND, syncope, near syncope, edema or palpitations.  Pulmonary:  Denies GARVEY, cough, or wheezing      Vitals:    07/10/19 1539   BP: 110/62   BP Location: Right arm   Patient Position: Sitting   Pulse: 96   SpO2: 97%   Weight: 71.6 kg (157 lb 12.8 oz)     PE:  General: NAD  Neck: no JVD, no carotid bruits, no TM  Heart RRR, NL S1, S2, S4 present, no rubs, murmurs  Lungs: CTA, no wheezes, rhonchi, or rales  Abd: soft, non-tender, NL BS  Ext: No musculoskeletal deformities, no edema, cyanosis, or clubbing  Psych: normal mood and affect    Diagnostic Data:        ECG 12 Lead  Date/Time: 7/10/2019 3:53 PM  Performed by: Darryl Swenson MD  Authorized by: Darryl Swenson MD   Comparison: compared with previous ECG from 5/15/2019  Similar to previous ECG  Rhythm: paced  BPM: 96              1. Paroxysmal atrial fibrillation (CMS/HCC)    2. SSS (sick sinus syndrome) (CMS/HCC)    3. Essential hypertension      Manual device interrogation: Biotronik PPM, 91% RA paced, 2% RV paced, 7 years battery life, 2% AF (during first month following RFA, none since)    Plan:  1) Paroxysmal atrial fibrillation:  - S/p PVA, 04/2019.  Overall maintaining NSR off antiarrhythmics  - Continue present medications.   2)  Anticoagulation:  - CHADSVASc = 4, on Eliquis  3) SSS:  - S/p PPM implant, device with normal function  4) HTN:  - Well controlled on amlodipine, losartan  - Wt loss, exercise, salt reduction    F/up in 6 months    Scribed for Darryl Swenson MD by Blanca Lane, PATSY. 7/10/2019  3:54 PM     I, Darryl Swenson MD, personally performed the services described in this documentation as scribed by the above named individual in my presence, and it is both accurate and complete.  7/10/2019  4:01 PM

## 2019-07-22 ENCOUNTER — TELEPHONE (OUTPATIENT)
Dept: CARDIOLOGY | Facility: CLINIC | Age: 79
End: 2019-07-22

## 2019-07-22 NOTE — TELEPHONE ENCOUNTER
Patient called because she doesn't understand why her Eliquis costs $127 instead of $30. I called her insurance and they notified me that she is in the doughnut hole. I called to notify the patient.

## 2019-08-15 ENCOUNTER — OFFICE VISIT (OUTPATIENT)
Dept: ORTHOPEDIC SURGERY | Facility: CLINIC | Age: 79
End: 2019-08-15

## 2019-08-15 VITALS — HEART RATE: 93 BPM | WEIGHT: 157 LBS | OXYGEN SATURATION: 93 % | BODY MASS INDEX: 27.82 KG/M2 | HEIGHT: 63 IN

## 2019-08-15 DIAGNOSIS — Z96.642 STATUS POST TOTAL REPLACEMENT OF LEFT HIP: Primary | ICD-10-CM

## 2019-08-15 PROCEDURE — 99212 OFFICE O/P EST SF 10 MIN: CPT | Performed by: ORTHOPAEDIC SURGERY

## 2019-08-15 RX ORDER — TRAZODONE HYDROCHLORIDE 50 MG/1
TABLET ORAL
Refills: 5 | COMMUNITY
Start: 2019-07-20

## 2019-08-15 NOTE — PROGRESS NOTES
Orthopaedic Clinic Note: Hip Established Patient    Chief Complaint   Patient presents with   • Follow-up     3 months- 6 months s/p (L) LUDMILA 01/30/2019         HPI    It has been 3  month(s) since Ms. Ruvalcaba's last visit. She returns to clinic today for 6-month follow-up status post left total hip arthroplasty. She rates her pain a 0/10 on the pain scale and is currently taking nothing for pain. She is ambulating with no assistive device. She has completed postoperative rehabilitation program.  She denies fevers, chills, or constitutional symptoms. Overall, she is doing better.  She is extremely happy with her outcome.  She denies complications.    Past Medical History:   Diagnosis Date   • AF (atrial fibrillation) (CMS/HCC)    • Anesthesia complication     HARD TO WAKE   • Anxiety    • Arrhythmia     atrial fibrillation   • Arthritis     Left hip   • Carotid artery occlusion     right   • Chronic hip pain, left 1/7/2019   • Coronary artery disease    • Dyslipidemia    • Elevated cholesterol    • History of cardioversion 12/03/2010    initiation of Tambocor and subsequent external cardioversion.   • Hyperlipidemia    • Hypertension    • Hypothyroidism    • ÁNGELA (obstructive sleep apnea)    • Paroxysmal atrial fibrillation (CMS/HCC)    • SSS (sick sinus syndrome) (CMS/HCC)    • Valvular heart disease    • Vertigo    • Wears glasses       Past Surgical History:   Procedure Laterality Date   • BREAST BIOPSY Left     MARKER IN PLACE   • CARDIAC CATHETERIZATION Left 6/16/2016    Procedure: Cardiac catheterization;  Surgeon: Chuck Clark MD;  Location:  [facility] CATH INVASIVE LOCATION;  Service:    • CARDIAC ELECTROPHYSIOLOGY PROCEDURE N/A 2/17/2017    Procedure: Pacemaker DC new;  Surgeon: Darryl Swenson MD;  Location:  [facility] EP INVASIVE LOCATION;  Service:    • CARDIAC ELECTROPHYSIOLOGY PROCEDURE N/A 4/8/2019    Procedure: Ablation atrial fibrillation, hold Tikosyn for 5 days stay on metoprolol.;  Surgeon:  Darryl Swenson MD;  Location: [facility] EP INVASIVE LOCATION;  Service: Cardiovascular   • CAROTID ENDARTERECTOMY Left    • CATARACT EXTRACTION Bilateral    • COLONOSCOPY      every 5-10 years. does not plan to have anymore   • PACEMAKER IMPLANTATION     • TOTAL HIP ARTHROPLASTY Left 1/30/2019    Procedure: TOTAL HIP ARTHROPLASTY LEFT;  Surgeon: Freddie Vale MD;  Location: [facility] OR;  Service: Orthopedics   • TUBAL ABDOMINAL LIGATION        Family History   Problem Relation Age of Onset   • Alzheimer's disease Mother    • Cancer Mother    • Aortic aneurysm Father    • Heart valve disorder Sister      Social History     Socioeconomic History   • Marital status:      Spouse name: Not on file   • Number of children: Not on file   • Years of education: Not on file   • Highest education level: Not on file   Occupational History   • Occupation: retired   Tobacco Use   • Smoking status: Never Smoker   • Smokeless tobacco: Never Used   Substance and Sexual Activity   • Alcohol use: Yes     Alcohol/week: 0.6 oz     Types: 1 Glasses of wine per week     Frequency: Monthly or less     Comment: OCCASSIONAL   • Drug use: No   • Sexual activity: Defer   Social History Narrative    Denies caffeine use. Lives at home with .       Current Outpatient Medications on File Prior to Visit   Medication Sig Dispense Refill   • acetaminophen (TYLENOL) 325 MG tablet Take 2 tablets by mouth Every 4 (Four) Hours As Needed for Mild Pain .     • amLODIPine (NORVASC) 5 MG tablet Take 5 mg by mouth Every Morning.     • apixaban (ELIQUIS) 5 MG tablet tablet Take 1 tablet by mouth Every 12 (Twelve) Hours 60 tablet 6   • aspirin 81 MG EC tablet Take 81 mg by mouth Every Night.     • cholecalciferol (VITAMIN D3) 1000 UNITS tablet Take 1,000 Units by mouth Daily.     • coenzyme Q10 100 MG capsule Take 100 mg by mouth Daily.     • furosemide (LASIX) 20 MG tablet Take 1 tablet by mouth Daily. Take as needed for edema. (Patient  "taking differently: Take 20 mg by mouth Daily As Needed. Take as needed for edema.) 90 tablet 3   • levothyroxine (SYNTHROID, LEVOTHROID) 25 MCG tablet Take 25 mcg by mouth Every Morning.     • losartan (COZAAR) 100 MG tablet Take 1 tablet by mouth Daily. (Patient taking differently: Take 100 mg by mouth Every Morning.) 30 tablet 6   • meclizine (ANTIVERT) 25 MG tablet Take 25 mg by mouth 3 (Three) Times a Day As Needed for dizziness.     • metoprolol tartrate (LOPRESSOR) 100 MG tablet Take 1 tablet by mouth 2 (Two) Times a Day. 60 tablet 11   • nitroglycerin (NITROSTAT) 0.4 MG SL tablet Place 0.4 mg under the tongue Every 5 (Five) Minutes As Needed for chest pain. Take no more than 3 doses in 15 minutes.     • PARoxetine (PAXIL) 20 MG tablet Take 10 mg by mouth Every Night.     • potassium chloride (K-DUR) 10 MEQ CR tablet Take 1 tablet by mouth Daily. Take with lasix 30 tablet 11   • rosuvastatin (CRESTOR) 20 MG tablet Take 20 mg by mouth 2 (Two) Times a Week. Wed and Sat     • traZODone (DESYREL) 50 MG tablet TAKE 1-2 TABLETS AT BEDTIME AS NEEDED INSOMNIA  5     No current facility-administered medications on file prior to visit.       Allergies   Allergen Reactions   • Beta Adrenergic Blockers Dizziness     DIZZINESS    • Sulfa Antibiotics Rash     RASH         Review of Systems   Constitutional: Negative.    HENT: Negative.    Eyes: Negative.    Respiratory: Negative.    Cardiovascular: Negative.    Gastrointestinal: Negative.    Endocrine: Negative.    Genitourinary: Negative.    Musculoskeletal: Positive for arthralgias.   Skin: Negative.    Allergic/Immunologic: Negative.    Neurological: Negative.    Hematological: Negative.    Psychiatric/Behavioral: Negative.         The patient's Review of Systems was personally reviewed and confirmed as accurate.    Physical Exam  Pulse 93, height 160 cm (62.99\"), weight 71.2 kg (157 lb), SpO2 93 %, not currently breastfeeding.    Body mass index is 27.82 " kg/m².    GENERAL APPEARANCE: awake, alert, oriented, in no acute distress and well developed, well nourished  LUNGS:  breathing nonlabored  EXTREMITIES: no clubbing, cyanosis  PERIPHERAL PULSES: palpable dorsalis pedis and posterior tibial pulses bilaterally.    GAIT:  Normal            Hip Exam:  Left    RANGE OF MOTION:  EXTENSION/FLEXION:  normal (0-110 degrees)  IR (at 90 degrees of flexion):  20  ER (at 90 degrees of flexion):  40  PAIN WITH HIP MOTION:  no  PAIN WITH LOGROLL:  no     STINCFIELD TEST: negative    STRENGTH:  ABDUCTOR:  5/5  ADDUCTOR:  5/5  HIP FLEXION:  5/5    GREATER TROCHANTER BURSAL PAIN:  no    SENSATION TO LIGHT TOUCH:  DEEP PERONEAL/SUPERFICIAL PERONEAL/SURAL/SAPHENOUS/TIBIAL:   intact    EDEMA:  no  ERYTHEMA:  no  WOUNDS/INCISIONS:  yes, Well-healed posterior lateral hip incision  _________________________________________________________________  _________________________________________________________________    RADIOGRAPHIC FINDINGS:   Indication: Status post left total hip arthroplasty    Comparison: Todays xrays were compared to previous xrays from 5/16/2019    AP pelvis: Left: Demonstrate a well positioned total hip without evidence of wear, loosening, fracture or osteolysis, femoral head is concentrically reduced within the acetabulum and No significant changes compared to prior radiographs.    Assessment/Plan:   Diagnosis Plan   1. Status post total replacement of left hip  XR Pelvis 1 or 2 View     Patient is doing well 6-month status post left total hip arthroplasty.  Continue activity as tolerated without restrictions.  I will see the patient back in 6 months for repeat assessment for 1 year anniversary with repeat x-ray AP pelvis    Freddie Vale MD  08/15/19  2:00 PM

## 2019-08-20 ENCOUNTER — CLINICAL SUPPORT NO REQUIREMENTS (OUTPATIENT)
Dept: CARDIOLOGY | Facility: CLINIC | Age: 79
End: 2019-08-20

## 2019-08-20 DIAGNOSIS — I48.0 PAROXYSMAL ATRIAL FIBRILLATION (HCC): Primary | ICD-10-CM

## 2019-08-20 DIAGNOSIS — I49.5 SSS (SICK SINUS SYNDROME) (HCC): ICD-10-CM

## 2019-08-20 PROCEDURE — 93294 REM INTERROG EVL PM/LDLS PM: CPT | Performed by: INTERNAL MEDICINE

## 2019-08-20 PROCEDURE — 93296 REM INTERROG EVL PM/IDS: CPT | Performed by: INTERNAL MEDICINE

## 2019-09-10 RX ORDER — APIXABAN 5 MG/1
TABLET, FILM COATED ORAL
Qty: 60 TABLET | Refills: 6 | Status: SHIPPED | OUTPATIENT
Start: 2019-09-10 | End: 2020-04-15

## 2019-09-25 ENCOUNTER — DOCUMENTATION (OUTPATIENT)
Dept: CARDIOLOGY | Facility: CLINIC | Age: 79
End: 2019-09-25

## 2019-09-25 ENCOUNTER — OFFICE VISIT (OUTPATIENT)
Dept: CARDIOLOGY | Facility: CLINIC | Age: 79
End: 2019-09-25

## 2019-09-25 VITALS
HEART RATE: 80 BPM | OXYGEN SATURATION: 92 % | HEIGHT: 62 IN | SYSTOLIC BLOOD PRESSURE: 112 MMHG | WEIGHT: 156 LBS | BODY MASS INDEX: 28.71 KG/M2 | DIASTOLIC BLOOD PRESSURE: 60 MMHG

## 2019-09-25 DIAGNOSIS — I48.0 PAROXYSMAL ATRIAL FIBRILLATION (HCC): Primary | ICD-10-CM

## 2019-09-25 DIAGNOSIS — I49.5 SSS (SICK SINUS SYNDROME) (HCC): ICD-10-CM

## 2019-09-25 DIAGNOSIS — I10 ESSENTIAL HYPERTENSION: ICD-10-CM

## 2019-09-25 PROCEDURE — 99214 OFFICE O/P EST MOD 30 MIN: CPT | Performed by: INTERNAL MEDICINE

## 2019-09-25 PROCEDURE — 93280 PM DEVICE PROGR EVAL DUAL: CPT | Performed by: INTERNAL MEDICINE

## 2019-09-25 RX ORDER — TRAMADOL HYDROCHLORIDE 50 MG/1
50 TABLET ORAL 3 TIMES DAILY PRN
Refills: 2 | Status: ON HOLD | COMMUNITY
Start: 2019-09-12 | End: 2020-01-30

## 2019-09-25 NOTE — RESEARCH
Patient was seen in the office to discuss possible [facility] closure. Risk and benefits were discussed. Patient was provided the SDM tool to further discuss with multi disciplinary team . I will follow up with patient after SDM visit for next steps. She is interested in possible Ever implant date.

## 2019-10-22 ENCOUNTER — TELEPHONE (OUTPATIENT)
Dept: CARDIOLOGY | Facility: CLINIC | Age: 79
End: 2019-10-22

## 2019-10-22 NOTE — TELEPHONE ENCOUNTER
I suggested she call her PCP. She said she had thought she would do that earlier but just wanted to check on the amount of afib she was having first.

## 2019-11-13 ENCOUNTER — OFFICE VISIT (OUTPATIENT)
Dept: CARDIOLOGY | Facility: HOSPITAL | Age: 79
End: 2019-11-13

## 2019-11-13 ENCOUNTER — HOSPITAL ENCOUNTER (OUTPATIENT)
Dept: CARDIOLOGY | Facility: HOSPITAL | Age: 79
Discharge: HOME | End: 2019-11-13
Admitting: NURSE PRACTITIONER

## 2019-11-13 VITALS — WEIGHT: 157 LBS | HEIGHT: 62 IN | BODY MASS INDEX: 28.89 KG/M2

## 2019-11-13 VITALS
HEART RATE: 94 BPM | OXYGEN SATURATION: 95 % | TEMPERATURE: 97.2 F | SYSTOLIC BLOOD PRESSURE: 137 MMHG | RESPIRATION RATE: 18 BRPM | WEIGHT: 157.13 LBS | BODY MASS INDEX: 28.91 KG/M2 | HEIGHT: 62 IN | DIASTOLIC BLOOD PRESSURE: 61 MMHG

## 2019-11-13 DIAGNOSIS — I48.0 PAROXYSMAL ATRIAL FIBRILLATION (HCC): ICD-10-CM

## 2019-11-13 DIAGNOSIS — I50.9 PLEURAL EFFUSION DUE TO CHF (CONGESTIVE HEART FAILURE) (HCC): Primary | ICD-10-CM

## 2019-11-13 DIAGNOSIS — I10 ESSENTIAL HYPERTENSION: ICD-10-CM

## 2019-11-13 DIAGNOSIS — I34.0 MITRAL VALVE INSUFFICIENCY, UNSPECIFIED ETIOLOGY: ICD-10-CM

## 2019-11-13 DIAGNOSIS — I50.9 PLEURAL EFFUSION DUE TO CHF (CONGESTIVE HEART FAILURE) (HCC): ICD-10-CM

## 2019-11-13 PROCEDURE — 93005 ELECTROCARDIOGRAM TRACING: CPT | Performed by: NURSE PRACTITIONER

## 2019-11-13 PROCEDURE — 93306 TTE W/DOPPLER COMPLETE: CPT

## 2019-11-13 PROCEDURE — 93010 ELECTROCARDIOGRAM REPORT: CPT | Performed by: INTERNAL MEDICINE

## 2019-11-13 PROCEDURE — 93306 TTE W/DOPPLER COMPLETE: CPT | Performed by: INTERNAL MEDICINE

## 2019-11-13 PROCEDURE — 99214 OFFICE O/P EST MOD 30 MIN: CPT | Performed by: NURSE PRACTITIONER

## 2019-11-13 RX ORDER — POTASSIUM CHLORIDE 750 MG/1
10 TABLET, FILM COATED, EXTENDED RELEASE ORAL 2 TIMES DAILY
Qty: 30 TABLET | Refills: 11
Start: 2019-11-13 | End: 2019-11-21 | Stop reason: SDUPTHER

## 2019-11-13 RX ORDER — FUROSEMIDE 20 MG/1
20 TABLET ORAL 2 TIMES DAILY
Qty: 90 TABLET | Refills: 3
Start: 2019-11-13 | End: 2019-11-21 | Stop reason: SDUPTHER

## 2019-11-13 NOTE — PROGRESS NOTES
[facility]  [facility]      Encounter Date:11/13/2019     Kenisha Ruvalcaba  [address] [facility] [address]  [unfilled]    1940    Lana Flores MD    Kenisha Ruvalcaba is a 79 y.o. female.      Subjective:     Chief Complaint:  Same day visit-shortness of breath     HPI   Patient is an 79-year-old female with past medical history significant for paroxysmal atrial fibrillation status post PVA 4/2019, sick sinus syndrome status post PPM, carotid artery stenosis status post left CEA, nonobstructive coronary artery disease mild to moderate mitral regurgitation who presents to the [facility] for evaluation of CHF at the request of Dr. Flores.  Patient reports recent increasing weakness and shortness of breath.  She had a CTA chest with contrast 11/8 which showed bilateral pleural effusions (30% of each hemithorax), subcutaneous edema and passive hepatic venous congestion.  CTA chest was ordered in follow-up from an ultrasound that was done for weight loss which showed bilateral pleural effusions, mild ascites, pericardial effusion and gallbladder wall thickening.    She reports that she lost her balance and fell at the end of August and was at  trauma unit with a broken arm and nose.  Since then she is felt things go downhill.  She reports that she had significant nose bleeding and her hemoglobin dropped down to 9.  She started to feel better in September and then later in October she started having increased shortness of breath and fatigue.  She denies weight gain and reports that she actually has had weight loss.  She does have orthopnea/PND    Last remote device check in August showed no A. Fib. Has upcoming remote check 11/19    Patient Active Problem List   Diagnosis   • Paroxysmal atrial fibrillation (CMS/HCC)   • Abnormal stress test   • Essential hypertension   • Hyperlipidemia LDL goal <70   • Coronary artery disease involving native coronary artery of  native heart with angina pectoris (CMS/HCC)   • Left-sided carotid artery disease (CMS/HCC)   • Vertigo   • Anxiety   • Mitral regurgitation   • Coronary artery disease   • H/O echocardiogram   • History of cardioversion   • SSS (sick sinus syndrome) (CMS/HCC)   • History of Doppler ultrasound   • Dyslipidemia   • Tachycardia-bradycardia syndrome (CMS/HCC)   • Chronic hip pain, left   • Primary osteoarthritis of left hip   • Status post total replacement of left hip   • Hypothyroid   • Hypokalemia, replaced   • Prediabetes   • Leukocytosis, mild, likely reactive   • Acute blood loss anemia, mild, asymptomatic   • Acute postoperative pain       Past Medical History:   Diagnosis Date   • AF (atrial fibrillation) (CMS/HCC)    • Anesthesia complication     HARD TO WAKE   • Anxiety    • Arrhythmia     atrial fibrillation   • Arthritis     Left hip   • Broken arm 08/30/2019    left upper arm   • Broken nose 08/30/2019   • Carotid artery occlusion     right   • Chronic hip pain, left 1/7/2019   • Coronary artery disease    • Dyslipidemia    • Elevated cholesterol    • Fall 08/30/2019   • History of cardioversion 12/03/2010    initiation of Tambocor and subsequent external cardioversion.   • Hyperlipidemia    • Hypertension    • Hypothyroidism    • ÁNGELA (obstructive sleep apnea)    • Paroxysmal atrial fibrillation (CMS/HCC)    • SSS (sick sinus syndrome) (CMS/HCC)    • Valvular heart disease    • Vertigo    • Wears glasses        Past Surgical History:   Procedure Laterality Date   • BREAST BIOPSY Left     MARKER IN PLACE   • CARDIAC CATHETERIZATION Left 6/16/2016    Procedure: Cardiac catheterization;  Surgeon: Chuck Clark MD;  Location:  [facility] CATH INVASIVE LOCATION;  Service:    • CARDIAC ELECTROPHYSIOLOGY PROCEDURE N/A 2/17/2017    Procedure: Pacemaker DC new;  Surgeon: Darryl Swenson MD;  Location:  [facility] EP INVASIVE LOCATION;  Service:    • CARDIAC ELECTROPHYSIOLOGY PROCEDURE N/A 4/8/2019    Procedure:  Ablation atrial fibrillation, hold Tikosyn for 5 days stay on metoprolol.;  Surgeon: Darryl Swenson MD;  [facility];  Service: Cardiovascular   • CAROTID ENDARTERECTOMY Left    • CATARACT EXTRACTION Bilateral    • COLONOSCOPY      every 5-10 years. does not plan to have anymore   • PACEMAKER IMPLANTATION     • TOTAL HIP ARTHROPLASTY Left 1/30/2019    Procedure: TOTAL HIP ARTHROPLASTY LEFT;  Surgeon: Freddie Vale MD;  [facility];  Service: Orthopedics   • TUBAL ABDOMINAL LIGATION         Family History   Problem Relation Age of Onset   • Alzheimer's disease Mother    • Cancer Mother    • Aortic aneurysm Father    • Heart valve disorder Sister        Social History     Socioeconomic History   • Marital status:      Spouse name: Not on file   • Number of children: Not on file   • Years of education: Not on file   • Highest education level: Not on file   Occupational History   • Occupation: retired   Tobacco Use   • Smoking status: Never Smoker   • Smokeless tobacco: Never Used   Substance and Sexual Activity   • Alcohol use: Yes     Alcohol/week: 0.6 oz     Types: 1 Glasses of wine per week     Frequency: Monthly or less     Comment: occas   • Drug use: No   • Sexual activity: Defer   Social History Narrative    Denies caffeine use. Lives at home with .        Allergies   Allergen Reactions   • Beta Adrenergic Blockers Dizziness     DIZZINESS    • Sulfa Antibiotics Rash     RASH          Current Outpatient Medications:   •  acetaminophen (TYLENOL) 325 MG tablet, Take 2 tablets by mouth Every 4 (Four) Hours As Needed for Mild Pain ., Disp: , Rfl:   •  amLODIPine (NORVASC) 5 MG tablet, Take 5 mg by mouth Every Morning., Disp: , Rfl:   •  aspirin 81 MG EC tablet, Take 81 mg by mouth Every Night., Disp: , Rfl:   •  cholecalciferol (VITAMIN D3) 1000 UNITS tablet, Take 1,000 Units by mouth Daily., Disp: , Rfl:   •  coenzyme Q10 100 MG capsule, Take 100 mg by mouth  Daily., Disp: , Rfl:   •  ELIQUIS 5 MG tablet tablet, TAKE 1 BY MOUTH TWICE A DAY, Disp: 60 tablet, Rfl: 6  •  furosemide (LASIX) 20 MG tablet, Take 1 tablet by mouth 2 (Two) Times a Day., Disp: 90 tablet, Rfl: 3  •  levothyroxine (SYNTHROID, LEVOTHROID) 25 MCG tablet, Take 25 mcg by mouth Every Morning., Disp: , Rfl:   •  losartan (COZAAR) 100 MG tablet, Take 1 tablet by mouth Daily. (Patient taking differently: Take 100 mg by mouth Every Morning.), Disp: 30 tablet, Rfl: 6  •  meclizine (ANTIVERT) 25 MG tablet, Take 25 mg by mouth 3 (Three) Times a Day As Needed for dizziness., Disp: , Rfl:   •  metoprolol tartrate (LOPRESSOR) 100 MG tablet, Take 1 tablet by mouth 2 (Two) Times a Day., Disp: 60 tablet, Rfl: 11  •  nitroglycerin (NITROSTAT) 0.4 MG SL tablet, Place 0.4 mg under the tongue Every 5 (Five) Minutes As Needed for chest pain. Take no more than 3 doses in 15 minutes., Disp: , Rfl:   •  PARoxetine (PAXIL) 20 MG tablet, Take 10 mg by mouth Every Night., Disp: , Rfl:   •  potassium chloride (K-DUR) 10 MEQ CR tablet, Take 1 tablet by mouth 2 (Two) Times a Day. Take with lasix, Disp: 30 tablet, Rfl: 11  •  rosuvastatin (CRESTOR) 20 MG tablet, Take 20 mg by mouth 2 (Two) Times a Week. Wed and Sat, Disp: , Rfl:   •  traZODone (DESYREL) 50 MG tablet, TAKE 1-2 TABLETS AT BEDTIME AS NEEDED INSOMNIA, Disp: , Rfl: 5  •  VITAMIN D PO, Take 1 tablet by mouth Daily., Disp: , Rfl:   •  traMADol (ULTRAM) 50 MG tablet, Take 50 mg by mouth 3 (Three) Times a Day As Needed., Disp: , Rfl: 2    The following portions of the patient's history were reviewed today and updated as appropriate: allergies, current medications, past family history, past medical history, past social history, past surgical history and problem list     Review of Systems   Constitution: Positive for weakness, malaise/fatigue and weight loss. Negative for chills and fever.   HENT: Negative.    Eyes: Negative.    Cardiovascular: Positive for dyspnea on  "exertion and orthopnea. Negative for chest pain, claudication, cyanosis, irregular heartbeat, leg swelling, near-syncope, palpitations, paroxysmal nocturnal dyspnea and syncope.   Respiratory: Positive for cough, shortness of breath, snoring and sputum production.    Endocrine: Negative.    Hematologic/Lymphatic: Does not bruise/bleed easily.   Skin: Negative for poor wound healing.   Musculoskeletal: Positive for muscle weakness.   Gastrointestinal: Positive for nausea. Negative for abdominal pain, heartburn, hematemesis, melena and vomiting.   Genitourinary: Negative.  Negative for hematuria.   Neurological: Positive for dizziness and light-headedness.   Psychiatric/Behavioral: Positive for depression and memory loss. The patient is nervous/anxious.    Allergic/Immunologic: Negative.        Objective:     Vitals:    11/13/19 1401   BP: 137/61   BP Location: Right arm   Patient Position: Sitting   Cuff Size: Adult   Pulse: 94   Resp: 18   Temp: 97.2 °F (36.2 °C)   TempSrc: Temporal   SpO2: 95%   Weight: 71.3 kg (157 lb 2 oz)   Height: 157.5 cm (62\")       Body mass index is 28.74 kg/m².    Physical Exam   Constitutional: She is oriented to person, place, and time. She appears well-developed and well-nourished. No distress.   HENT:   Head: Normocephalic.   Eyes: Conjunctivae are normal. Pupils are equal, round, and reactive to light.   Neck: Neck supple. No JVD present. No thyromegaly present.   Cardiovascular: Normal rate, regular rhythm, normal heart sounds and intact distal pulses. Exam reveals no gallop and no friction rub.   No murmur heard.  Pulmonary/Chest: Effort normal. No respiratory distress. She has no wheezes. She has rales. She exhibits no tenderness.   Abdominal: Soft. Bowel sounds are normal.   Musculoskeletal: Normal range of motion. She exhibits no edema (trace).   Neurological: She is alert and oriented to person, place, and time.   Skin: Skin is warm and dry.   Psychiatric: She has a normal mood " and affect. Her behavior is normal. Thought content normal.   Vitals reviewed.      Lab and Diagnostic Review:    EKG: A-paced rhythm with prolonged AV conduction, minimal criteria for inferior infarct present but artifact noted    CTA chest 11/8/2019   Heart appears to be enlarged, bilateral pleural effusions covering approximately 30% of VT hemithorax, hepatic venous congestion, subcutaneous edema    Abdominal ultrasound 11/6/2019 showed bilateral pleural effusion, mild ascites, pericardial effusion and gallbladder wall thickening consistent with fluid overload    Labs 10/28/2019 show WBC 6.6, hemoglobin 9.7, hematocrit 30.7, platelets 300.  Vitamin B12 445.  Glucose 112, BUN 13, creatinine 0.98, sodium 140, potassium 4, chloride 100,carbon dioxide 22, total protein 6.9, AST 15, ALT 11.  TSH 5.45  Assessment and Plan:   1. Pleural effusion due to CHF (congestive heart failure) (CMS/HCC)  Attempted to IV diuresis multiple times but could not gain venous access.  Increase Lasix to 40 mg daily along with increase potassium to 20 meq  Patient to call me Friday if no improvement in symptoms   Echo today  - Adult Transthoracic Echo Complete W/ Cont if Necessary Per Protocol; Future    2. Paroxysmal atrial fibrillation (CMS/[facility])  S/p PVA, maintaining NSR  - Adult Transthoracic Echo Complete W/ Cont if Necessary Per Protocol; Future  - ECG 12 Lead; Future    3. Essential hypertension  Controlled  - Adult Transthoracic Echo Complete W/ Cont if Necessary Per Protocol; Future    4. Mitral valve insufficiency, unspecified etiology  - Adult Transthoracic Echo Complete W/ Cont if Necessary Per Protocol; Future    Further plans pending echo results   RV 1 week      It has been a pleasure to participate in the care of this patient.  Patient was instructed to call the [facility] with any questions, concerns, or worsening symptoms.    *Please note that portions of this note were completed with a voice recognition  program. Efforts were made to edit the dictations, but occasionally words are mistranscribed.

## 2019-11-14 LAB
BH CV ECHO MEAS - AO MAX PG: 12 MMHG
BH CV ECHO MEAS - AO MEAN PG: 7 MMHG
BH CV ECHO MEAS - AO ROOT AREA (BSA CORRECTED): 1.6
BH CV ECHO MEAS - AO ROOT AREA: 5.7 CM^2
BH CV ECHO MEAS - AO ROOT DIAM: 2.7 CM
BH CV ECHO MEAS - AO V2 MAX: 175 CM/SEC
BH CV ECHO MEAS - AO V2 VTI: 35.2 CM
BH CV ECHO MEAS - AVA(I,D): 1.7 CM2
BH CV ECHO MEAS - BSA(HAYCOCK): 1.8 M^2
BH CV ECHO MEAS - BSA: 1.7 M^2
BH CV ECHO MEAS - BZI_BMI: 28.7 KILOGRAMS/M^2
BH CV ECHO MEAS - BZI_METRIC_HEIGHT: 157.5 CM
BH CV ECHO MEAS - BZI_METRIC_WEIGHT: 71.2 KG
BH CV ECHO MEAS - EDV(CUBED): 57.1 ML
BH CV ECHO MEAS - EDV(MOD-SP2): 22 ML
BH CV ECHO MEAS - EDV(MOD-SP4): 27 ML
BH CV ECHO MEAS - EDV(TEICH): 63.9 ML
BH CV ECHO MEAS - EF(CUBED): 65.1 %
BH CV ECHO MEAS - EF(MOD-BP): 62 %
BH CV ECHO MEAS - EF(MOD-SP2): 54.5 %
BH CV ECHO MEAS - EF(MOD-SP4): 63 %
BH CV ECHO MEAS - EF(TEICH): 57.3 %
BH CV ECHO MEAS - ESV(CUBED): 19.9 ML
BH CV ECHO MEAS - ESV(MOD-SP2): 10 ML
BH CV ECHO MEAS - ESV(MOD-SP4): 10 ML
BH CV ECHO MEAS - ESV(TEICH): 27.3 ML
BH CV ECHO MEAS - FS: 29.6 %
BH CV ECHO MEAS - IVS/LVPW: 1
BH CV ECHO MEAS - IVSD: 1.1 CM
BH CV ECHO MEAS - LA DIMENSION: 3.7 CM
BH CV ECHO MEAS - LA/AO: 1.4
BH CV ECHO MEAS - LAD MAJOR: 4.7 CM
BH CV ECHO MEAS - LV DIASTOLIC VOL/BSA (35-75): 15.7 ML/M^2
BH CV ECHO MEAS - LV MASS(C)D: 129.3 GRAMS
BH CV ECHO MEAS - LV MASS(C)DI: 75 GRAMS/M^2
BH CV ECHO MEAS - LV MAX PG: 6 MMHG
BH CV ECHO MEAS - LV MEAN PG: 3 MMHG
BH CV ECHO MEAS - LV SYSTOLIC VOL/BSA (12-30): 5.8 ML/M^2
BH CV ECHO MEAS - LV V1 MAX: 117 CM/SEC
BH CV ECHO MEAS - LV V1 VTI: 19.5 CM
BH CV ECHO MEAS - LVIDD: 3.9 CM
BH CV ECHO MEAS - LVIDS: 2.7 CM
BH CV ECHO MEAS - LVLD AP2: 5.7 CM
BH CV ECHO MEAS - LVLD AP4: 6.4 CM
BH CV ECHO MEAS - LVLS AP2: 4.4 CM
BH CV ECHO MEAS - LVLS AP4: 4.8 CM
BH CV ECHO MEAS - LVOT AREA (M): 3.1 CM^2
BH CV ECHO MEAS - LVOT AREA: 3.1 CM^2
BH CV ECHO MEAS - LVOT DIAM: 2 CM
BH CV ECHO MEAS - LVPWD: 1 CM
BH CV ECHO MEAS - PA ACC SLOPE: 1075 CM/SEC^2
BH CV ECHO MEAS - PA ACC TIME: 0.07 SEC
BH CV ECHO MEAS - PA PR(ACCEL): 45.7 MMHG
BH CV ECHO MEAS - RAP SYSTOLE: 15 MMHG
BH CV ECHO MEAS - RVSP: 95 MMHG
BH CV ECHO MEAS - SI(CUBED): 21.5 ML/M^2
BH CV ECHO MEAS - SI(MOD-SP2): 7 ML/M^2
BH CV ECHO MEAS - SI(MOD-SP4): 9.9 ML/M^2
BH CV ECHO MEAS - SI(TEICH): 21.3 ML/M^2
BH CV ECHO MEAS - SV(CUBED): 37.2 ML
BH CV ECHO MEAS - SV(MOD-SP2): 12 ML
BH CV ECHO MEAS - SV(MOD-SP4): 17 ML
BH CV ECHO MEAS - SV(TEICH): 36.7 ML
BH CV ECHO MEAS - TR MAX PG: 80 MMHG
BH CV ECHO MEAS - TR MAX VEL: 448 CM/SEC
BH CV VAS BP RIGHT ARM: NORMAL MMHG
LEFT ATRIUM VOLUME INDEX: 42.3 ML/M^2
LEFT ATRIUM VOLUME: 73 ML
MAXIMAL PREDICTED HEART RATE: 140 BPM
STRESS TARGET HR: 119 BPM

## 2019-11-15 ENCOUNTER — TELEPHONE (OUTPATIENT)
Dept: CARDIOLOGY | Facility: HOSPITAL | Age: 79
End: 2019-11-15

## 2019-11-15 RX ORDER — METOLAZONE 2.5 MG/1
2.5 TABLET ORAL DAILY
Qty: 3 TABLET | Refills: 0 | Status: SHIPPED | OUTPATIENT
Start: 2019-11-15 | End: 2019-11-20

## 2019-11-15 NOTE — TELEPHONE ENCOUNTER
Called patient with echo results.  No significant change except for worsening pulmonary hypertension with severe TR, possibly due to fluid overload?  She reports that her symptoms have improved but still feels weak and dyspneic on exertion.  We will send her a 3-day course of metolazone 2.5 mg to be taken 30 minutes prior to Lasix.  Patient to call me Monday if symptoms persist.  Follow-up next week as scheduled

## 2019-11-18 ENCOUNTER — TELEPHONE (OUTPATIENT)
Dept: CARDIOLOGY | Facility: HOSPITAL | Age: 79
End: 2019-11-18

## 2019-11-18 DIAGNOSIS — I48.0 PAROXYSMAL ATRIAL FIBRILLATION (HCC): ICD-10-CM

## 2019-11-18 DIAGNOSIS — I27.20 PULMONARY HYPERTENSION (HCC): Primary | ICD-10-CM

## 2019-11-18 DIAGNOSIS — I25.119 CORONARY ARTERY DISEASE INVOLVING NATIVE CORONARY ARTERY OF NATIVE HEART WITH ANGINA PECTORIS (HCC): ICD-10-CM

## 2019-11-18 DIAGNOSIS — I49.5 TACHYCARDIA-BRADYCARDIA SYNDROME (HCC): ICD-10-CM

## 2019-11-18 DIAGNOSIS — I50.9 PLEURAL EFFUSION DUE TO CHF (CONGESTIVE HEART FAILURE) (HCC): ICD-10-CM

## 2019-11-18 NOTE — TELEPHONE ENCOUNTER
Called patient to check on her and she reports that she has lost about 8-10lbs of fluid. She still feels weak and fatigued. She feels better but still not great. Breathing is much better and she feels like she has gotten all the fluid off. I told her that I discussed case with Dr. Swenson and he agrees that she should see general cardiology for further evaluation of pulmonary HTN and CHF. She agrees to this. Advised to keep appt with me this week

## 2019-11-19 ENCOUNTER — CLINICAL SUPPORT NO REQUIREMENTS (OUTPATIENT)
Dept: CARDIOLOGY | Facility: CLINIC | Age: 79
End: 2019-11-19

## 2019-11-19 DIAGNOSIS — I49.5 SSS (SICK SINUS SYNDROME) (HCC): ICD-10-CM

## 2019-11-19 PROCEDURE — 93294 REM INTERROG EVL PM/LDLS PM: CPT | Performed by: INTERNAL MEDICINE

## 2019-11-19 PROCEDURE — 93296 REM INTERROG EVL PM/IDS: CPT | Performed by: INTERNAL MEDICINE

## 2019-11-19 NOTE — PROGRESS NOTES
[facility]  [facility]      Encounter Date:11/13/2019     Kenisha Ruvalcaba  [address] [facility] [address]  [unfilled]    1940    Lana Flores MD    Kenisha Ruvalcaba is a 79 y.o. female.      Subjective:     Chief Complaint:  Same day visit-shortness of breath     HPI   Patient is an 79-year-old female with past medical history significant for paroxysmal atrial fibrillation status post PVA 4/2019, sick sinus syndrome status post PPM, carotid artery stenosis status post left CEA, nonobstructive coronary artery disease, mild to moderate mitral regurgitation who presents today to follow up on CHF. Over the past month patient has been having increasing shortness of breath and weakness. She had a CTA chest ordered by her PCP with contrast 11/8 which showed bilateral pleural effusions (30% of each hemithorax), subcutaneous edema and passive hepatic venous congestion.  At her last visit IV diuresis was attempted but could not gain venous access.  She was started on Lasix 40 mg daily and since she only had mild improvement she was then started on metolazone for 3 days. Her echo showed normal left ventricular systolic function, mild AI and MR, and severe TR with severe pulmonary hypertension with RVSP of 95 mmHg trivial pericardial effusion noted and left pleural effusion.    She is down about 14 pounds according to our scales.  She feels better but still is weak and mildly dyspneic.  She notes difficulty doing routine activities such as brushing her teeth.  Her daughter reports that if she walks for any distance she complains of pain in between her shoulder blades.  She denies any chest pain.  No palpitations.  Heart cath in 2016 showed mild CAD.  She also notes orthostatic lightheadedness        Patient Active Problem List   Diagnosis   • Paroxysmal atrial fibrillation (CMS/HCC)   • Abnormal stress test   • Essential hypertension   • Hyperlipidemia LDL goal <70   • Coronary  artery disease involving native coronary artery of native heart with angina pectoris (CMS/HCC)   • Left-sided carotid artery disease (CMS/HCC)   • Vertigo   • Anxiety   • Mitral regurgitation   • Coronary artery disease   • H/O echocardiogram   • History of cardioversion   • SSS (sick sinus syndrome) (CMS/HCC)   • History of Doppler ultrasound   • Dyslipidemia   • Tachycardia-bradycardia syndrome (CMS/HCC)   • Chronic hip pain, left   • Primary osteoarthritis of left hip   • Status post total replacement of left hip   • Hypothyroid   • Hypokalemia, replaced   • Prediabetes   • Leukocytosis, mild, likely reactive   • Acute blood loss anemia, mild, asymptomatic   • Acute postoperative pain       Past Medical History:   Diagnosis Date   • AF (atrial fibrillation) (CMS/HCC)    • Anesthesia complication     HARD TO WAKE   • Anxiety    • Arrhythmia     atrial fibrillation   • Arthritis     Left hip   • Broken arm 08/30/2019    left upper arm   • Broken nose 08/30/2019   • Carotid artery occlusion     right   • Chronic hip pain, left 1/7/2019   • Coronary artery disease    • Dyslipidemia    • Elevated cholesterol    • Fall 08/30/2019   • History of cardioversion 12/03/2010    initiation of Tambocor and subsequent external cardioversion.   • Hyperlipidemia    • Hypertension    • Hypothyroidism    • ÁNGELA (obstructive sleep apnea)    • Paroxysmal atrial fibrillation (CMS/HCC)    • SSS (sick sinus syndrome) (CMS/[facility])    • Valvular heart disease    • Vertigo    • Wears glasses        Past Surgical History:   Procedure Laterality Date   • BREAST BIOPSY Left     MARKER IN PLACE   • CARDIAC CATHETERIZATION Left 6/16/2016    Procedure: Cardiac catheterization;  Surgeon: Chuck Clark MD;  Location:  [facility] CATH INVASIVE LOCATION;  Service:    • CARDIAC ELECTROPHYSIOLOGY PROCEDURE N/A 2/17/2017    Procedure: Pacemaker DC new;  Surgeon: Darryl Swenson MD;  Location:  [facility] EP INVASIVE LOCATION;  Service:    • CARDIAC  ELECTROPHYSIOLOGY PROCEDURE N/A 4/8/2019    Procedure: Ablation atrial fibrillation, hold Tikosyn for 5 days stay on metoprolol.;  Surgeon: Darryl Swenson MD;  Location: [facility] EP INVASIVE LOCATION;  Service: Cardiovascular   • CAROTID ENDARTERECTOMY Left    • CATARACT EXTRACTION Bilateral    • COLONOSCOPY      every 5-10 years. does not plan to have anymore   • PACEMAKER IMPLANTATION     • TOTAL HIP ARTHROPLASTY Left 1/30/2019    Procedure: TOTAL HIP ARTHROPLASTY LEFT;  Surgeon: Freddie Vale MD;  Location: [facility] OR;  Service: Orthopedics   • TUBAL ABDOMINAL LIGATION         Family History   Problem Relation Age of Onset   • Alzheimer's disease Mother    • Cancer Mother    • Aortic aneurysm Father    • Heart valve disorder Sister        Social History     Socioeconomic History   • Marital status:      Spouse name: Not on file   • Number of children: Not on file   • Years of education: Not on file   • Highest education level: Not on file   Occupational History   • Occupation: retired   Tobacco Use   • Smoking status: Never Smoker   • Smokeless tobacco: Never Used   Substance and Sexual Activity   • Alcohol use: Yes     Alcohol/week: 0.6 oz     Types: 1 Glasses of wine per week     Frequency: Monthly or less     Comment: occas   • Drug use: No   • Sexual activity: Defer   Social History Narrative    Denies caffeine use. Lives at home with .        Allergies   Allergen Reactions   • Beta Adrenergic Blockers Dizziness     DIZZINESS    • Sulfa Antibiotics Rash     RASH          Current Outpatient Medications:   •  acetaminophen (TYLENOL) 325 MG tablet, Take 2 tablets by mouth Every 4 (Four) Hours As Needed for Mild Pain ., Disp: , Rfl:   •  aspirin 81 MG EC tablet, Take 81 mg by mouth Every Night., Disp: , Rfl:   •  cholecalciferol (VITAMIN D3) 1000 UNITS tablet, Take 1,000 Units by mouth Daily., Disp: , Rfl:   •  coenzyme Q10 100 MG capsule, Take 100 mg by mouth Daily., Disp: , Rfl:   •   ELIQUIS 5 MG tablet tablet, TAKE 1 BY MOUTH TWICE A DAY, Disp: 60 tablet, Rfl: 6  •  furosemide (LASIX) 20 MG tablet, Take 1 tablet by mouth 2 (Two) Times a Day., Disp: 90 tablet, Rfl: 3  •  levothyroxine (SYNTHROID, LEVOTHROID) 25 MCG tablet, Take 25 mcg by mouth Every Morning., Disp: , Rfl:   •  losartan (COZAAR) 100 MG tablet, Take 0.5 tablets by mouth Daily., Disp: 30 tablet, Rfl: 6  •  meclizine (ANTIVERT) 25 MG tablet, Take 25 mg by mouth 3 (Three) Times a Day As Needed for dizziness., Disp: , Rfl:   •  metoprolol tartrate (LOPRESSOR) 100 MG tablet, Take 1 tablet by mouth 2 (Two) Times a Day., Disp: 60 tablet, Rfl: 11  •  nitroglycerin (NITROSTAT) 0.4 MG SL tablet, Place 0.4 mg under the tongue Every 5 (Five) Minutes As Needed for chest pain. Take no more than 3 doses in 15 minutes., Disp: , Rfl:   •  PARoxetine (PAXIL) 20 MG tablet, Take 10 mg by mouth Every Night., Disp: , Rfl:   •  potassium chloride (K-DUR) 10 MEQ CR tablet, Take 1 tablet by mouth 2 (Two) Times a Day. Take with lasix, Disp: 30 tablet, Rfl: 11  •  rosuvastatin (CRESTOR) 20 MG tablet, Take 20 mg by mouth 2 (Two) Times a Week. Wed and Sat, Disp: , Rfl:   •  traMADol (ULTRAM) 50 MG tablet, Take 50 mg by mouth 3 (Three) Times a Day As Needed., Disp: , Rfl: 2  •  traZODone (DESYREL) 50 MG tablet, TAKE 1-2 TABLETS AT BEDTIME AS NEEDED INSOMNIA, Disp: , Rfl: 5  •  VITAMIN D PO, Take 1 tablet by mouth Daily., Disp: , Rfl:     The following portions of the patient's history were reviewed today and updated as appropriate: allergies, current medications, past family history, past medical history, past social history, past surgical history and problem list     Review of Systems   Constitution: Positive for weakness, malaise/fatigue and weight loss. Negative for chills and fever.   HENT: Negative.    Eyes: Positive for blurred vision.   Cardiovascular: Positive for dyspnea on exertion and orthopnea. Negative for chest pain, claudication, cyanosis,  "irregular heartbeat, leg swelling, near-syncope, palpitations, paroxysmal nocturnal dyspnea and syncope.   Respiratory: Positive for cough, shortness of breath, snoring and sputum production.    Endocrine: Negative.    Hematologic/Lymphatic: Does not bruise/bleed easily.   Skin: Negative for poor wound healing.   Musculoskeletal: Positive for muscle weakness.   Gastrointestinal: Positive for constipation and nausea. Negative for abdominal pain, heartburn, hematemesis, melena and vomiting.   Genitourinary: Negative.  Negative for hematuria.   Neurological: Positive for dizziness and light-headedness.   Psychiatric/Behavioral: Positive for depression and memory loss. The patient is nervous/anxious.    Allergic/Immunologic: Negative.        Objective:     Vitals:    11/20/19 1309   BP: 90/51   BP Location: Left arm   Patient Position: Sitting   Cuff Size: Adult   Pulse: 89   Resp: 18   Temp: 97.6 °F (36.4 °C)   TempSrc: Temporal   SpO2: 95%   Weight: 64.9 kg (143 lb 2 oz)   Height: 157.5 cm (62\")       Body mass index is 26.18 kg/m².    Physical Exam   Constitutional: She is oriented to person, place, and time. She appears well-developed and well-nourished. No distress.   HENT:   Head: Normocephalic.   Eyes: Conjunctivae are normal. Pupils are equal, round, and reactive to light.   Neck: Neck supple. No JVD present. No thyromegaly present.   Cardiovascular: Normal rate, regular rhythm, normal heart sounds and intact distal pulses. Exam reveals no gallop and no friction rub.   No murmur heard.  Pulmonary/Chest: Effort normal. No respiratory distress. She has no wheezes. She has no rales. She exhibits no tenderness.   Abdominal: Soft. Bowel sounds are normal.   Musculoskeletal: Normal range of motion. She exhibits no edema.   Neurological: She is alert and oriented to person, place, and time.   Skin: Skin is warm and dry.   Psychiatric: She has a normal mood and affect. Her behavior is normal. Thought content normal. " "  Vitals reviewed.      Lab and Diagnostic Review:    CTA chest 11/8/2019   Heart appears to be enlarged, bilateral pleural effusions covering approximately 30% of VT hemithorax, hepatic venous congestion, subcutaneous edema    Abdominal ultrasound 11/6/2019 showed bilateral pleural effusion, mild ascites, pericardial effusion and gallbladder wall thickening consistent with fluid overload    Labs 10/28/2019 show WBC 6.6, hemoglobin 9.7, hematocrit 30.7, platelets 300.  Vitamin B12 445.  Glucose 112, BUN 13, creatinine 0.98, sodium 140, potassium 4, chloride 100,carbon dioxide 22, total protein 6.9, AST 15, ALT 11.  TSH 5.45    Echo 11/14/19  · Fibrocalcific changes are noted in the aortic valve.  · There is \"mild\" aortic stenosis/aortic insufficiency.  · Mild MR  · Severe TR with an RVSP of 95 mmHg         Assessment and Plan:   1. Acute right sided heart failure  Clinically improved. No significant fluid overload on todays exam  Continue lasix  BMP, BNP, CXR to confirm resolution of pleural effusions    2. Hypertension  Now hypotensive.  Decrease losartan to 50 mg daily.  Advised to hold losartan if systolic blood pressure less than 100    3. Stable angina  Exertional shoulder blade pain concerning for angina.  Considering recent decline in functional status and acute CHF will proceed with stress test with MPI    4.  Paroxysmal atrial fibrillation (CMS/HCC)  S/p PVA, maintaining NSR    5. Pulmonary HTN  RVSP 95 on echo, however she was significantly fluid overloaded.  Referral to cardiology for possible right heart cath    Cardiology referral pending  Further plans pending CXR, labs      It has been a pleasure to participate in the care of this patient.  Patient was instructed to call the [facility] with any questions, concerns, or worsening symptoms.    *Please note that portions of this note were completed with a voice recognition program. Efforts were made to edit the dictations, but occasionally " words are mistranscribed.

## 2019-11-20 ENCOUNTER — OFFICE VISIT (OUTPATIENT)
Dept: CARDIOLOGY | Facility: HOSPITAL | Age: 79
End: 2019-11-20

## 2019-11-20 ENCOUNTER — LAB (OUTPATIENT)
Dept: LAB | Facility: HOSPITAL | Age: 79
End: 2019-11-20

## 2019-11-20 ENCOUNTER — HOSPITAL ENCOUNTER (OUTPATIENT)
Dept: GENERAL RADIOLOGY | Facility: HOSPITAL | Age: 79
Discharge: HOME | End: 2019-11-20
Admitting: NURSE PRACTITIONER

## 2019-11-20 VITALS
WEIGHT: 143.13 LBS | BODY MASS INDEX: 26.34 KG/M2 | DIASTOLIC BLOOD PRESSURE: 51 MMHG | RESPIRATION RATE: 18 BRPM | TEMPERATURE: 97.6 F | HEART RATE: 89 BPM | HEIGHT: 62 IN | SYSTOLIC BLOOD PRESSURE: 90 MMHG | OXYGEN SATURATION: 95 %

## 2019-11-20 DIAGNOSIS — I50.811 ACUTE RIGHT-SIDED CONGESTIVE HEART FAILURE (HCC): ICD-10-CM

## 2019-11-20 DIAGNOSIS — I10 ESSENTIAL HYPERTENSION: ICD-10-CM

## 2019-11-20 DIAGNOSIS — R06.02 SHORTNESS OF BREATH: ICD-10-CM

## 2019-11-20 DIAGNOSIS — I50.811 ACUTE RIGHT-SIDED CONGESTIVE HEART FAILURE (HCC): Primary | ICD-10-CM

## 2019-11-20 DIAGNOSIS — I27.20 PULMONARY HYPERTENSION (HCC): ICD-10-CM

## 2019-11-20 DIAGNOSIS — I20.8 STABLE ANGINA (HCC): ICD-10-CM

## 2019-11-20 LAB
ANION GAP SERPL CALCULATED.3IONS-SCNC: 13.7 MMOL/L (ref 5–15)
BUN BLD-MCNC: 28 MG/DL (ref 8–23)
BUN/CREAT SERPL: 18.1 (ref 7–25)
CALCIUM SPEC-SCNC: 9.9 MG/DL (ref 8.6–10.5)
CHLORIDE SERPL-SCNC: 96 MMOL/L (ref 98–107)
CO2 SERPL-SCNC: 35.3 MMOL/L (ref 22–29)
CREAT BLD-MCNC: 1.55 MG/DL (ref 0.57–1)
GFR SERPL CREATININE-BSD FRML MDRD: 32 ML/MIN/1.73
GLUCOSE BLD-MCNC: 105 MG/DL (ref 65–99)
NT-PROBNP SERPL-MCNC: 1046 PG/ML (ref 5–1800)
POTASSIUM BLD-SCNC: 3.9 MMOL/L (ref 3.5–5.2)
SODIUM BLD-SCNC: 145 MMOL/L (ref 136–145)

## 2019-11-20 PROCEDURE — 99214 OFFICE O/P EST MOD 30 MIN: CPT | Performed by: NURSE PRACTITIONER

## 2019-11-20 PROCEDURE — 83880 ASSAY OF NATRIURETIC PEPTIDE: CPT

## 2019-11-20 PROCEDURE — 80048 BASIC METABOLIC PNL TOTAL CA: CPT

## 2019-11-20 PROCEDURE — 36415 COLL VENOUS BLD VENIPUNCTURE: CPT

## 2019-11-20 PROCEDURE — 71046 X-RAY EXAM CHEST 2 VIEWS: CPT

## 2019-11-20 RX ORDER — LOSARTAN POTASSIUM 100 MG/1
50 TABLET ORAL DAILY
Qty: 30 TABLET | Refills: 6
Start: 2019-11-20 | End: 2019-12-26

## 2019-11-21 ENCOUNTER — TELEPHONE (OUTPATIENT)
Dept: CARDIOLOGY | Facility: HOSPITAL | Age: 79
End: 2019-11-21

## 2019-11-21 RX ORDER — FUROSEMIDE 20 MG/1
20 TABLET ORAL DAILY
Qty: 90 TABLET | Refills: 3
Start: 2019-11-21 | End: 2020-05-21

## 2019-11-21 RX ORDER — POTASSIUM CHLORIDE 750 MG/1
10 TABLET, FILM COATED, EXTENDED RELEASE ORAL DAILY
Qty: 30 TABLET | Refills: 11
Start: 2019-11-21 | End: 2020-05-21

## 2019-11-21 NOTE — TELEPHONE ENCOUNTER
Results for orders placed or performed in visit on 11/20/19   Basic Metabolic Panel   Result Value Ref Range    Glucose 105 (H) 65 - 99 mg/dL    BUN 28 (H) 8 - 23 mg/dL    Creatinine 1.55 (H) 0.57 - 1.00 mg/dL    Sodium 145 136 - 145 mmol/L    Potassium 3.9 3.5 - 5.2 mmol/L    Chloride 96 (L) 98 - 107 mmol/L    CO2 35.3 (H) 22.0 - 29.0 mmol/L    Calcium 9.9 8.6 - 10.5 mg/dL    eGFR Non African Amer 32 (L) >60 mL/min/1.73    BUN/Creatinine Ratio 18.1 7.0 - 25.0    Anion Gap 13.7 5.0 - 15.0 mmol/L   BNP   Result Value Ref Range    proBNP 1,046.0 5.0-1,800.0 pg/mL   CXR  Very small pleural effusions and minimal left basilar  atelectasis. No evidence of overt congestive failure currently, or other  evidence of active disease.      Called patient with results.  Advised to decrease Lasix to 20 mg daily.  Continue with plan to cut losartan in half and to hold if systolic blood pressure less than 100. Will call her after stress test tomorrow

## 2019-11-22 ENCOUNTER — HOSPITAL ENCOUNTER (OUTPATIENT)
Dept: CARDIOLOGY | Facility: HOSPITAL | Age: 79
Discharge: HOME | End: 2019-11-22
Admitting: NURSE PRACTITIONER

## 2019-11-22 ENCOUNTER — HOSPITAL ENCOUNTER (OUTPATIENT)
Dept: CARDIOLOGY | Facility: HOSPITAL | Age: 79
Discharge: HOME | End: 2019-11-22

## 2019-11-22 ENCOUNTER — HOSPITAL ENCOUNTER (OUTPATIENT)
Dept: CARDIOLOGY | Facility: HOSPITAL | Age: 79
End: 2019-11-22

## 2019-11-22 VITALS
DIASTOLIC BLOOD PRESSURE: 80 MMHG | WEIGHT: 143.08 LBS | SYSTOLIC BLOOD PRESSURE: 122 MMHG | HEART RATE: 88 BPM | HEIGHT: 62 IN | BODY MASS INDEX: 26.33 KG/M2

## 2019-11-22 DIAGNOSIS — R06.02 SHORTNESS OF BREATH: ICD-10-CM

## 2019-11-22 DIAGNOSIS — I20.8 STABLE ANGINA (HCC): ICD-10-CM

## 2019-11-22 DIAGNOSIS — I50.811 ACUTE RIGHT-SIDED CONGESTIVE HEART FAILURE (HCC): ICD-10-CM

## 2019-11-22 DIAGNOSIS — I10 ESSENTIAL HYPERTENSION: ICD-10-CM

## 2019-11-22 LAB
BH CV STRESS BP STAGE 2: NORMAL
BH CV STRESS BP STAGE 3: NORMAL
BH CV STRESS COMMENTS STAGE 1: NORMAL
BH CV STRESS DOSE REGADENOSON STAGE 1: 0.4
BH CV STRESS DURATION MIN STAGE 1: 1
BH CV STRESS DURATION MIN STAGE 2: 1
BH CV STRESS DURATION MIN STAGE 3: 1
BH CV STRESS DURATION MIN STAGE 4: 1
BH CV STRESS DURATION SEC STAGE 1: 10
BH CV STRESS DURATION SEC STAGE 2: 0
BH CV STRESS HR STAGE 1: 88
BH CV STRESS HR STAGE 2: 99
BH CV STRESS HR STAGE 3: 101
BH CV STRESS HR STAGE 4: 92
BH CV STRESS PROTOCOL 1: NORMAL
BH CV STRESS RECOVERY BP: NORMAL MMHG
BH CV STRESS RECOVERY HR: 86 BPM
BH CV STRESS STAGE 1: 1
BH CV STRESS STAGE 2: 2
BH CV STRESS STAGE 3: 3
BH CV STRESS STAGE 4: 4
LV EF NUC BP: 76 %
MAXIMAL PREDICTED HEART RATE: 140 BPM
PERCENT MAX PREDICTED HR: 71.43 %
STRESS BASELINE BP: NORMAL MMHG
STRESS BASELINE HR: 88 BPM
STRESS PERCENT HR: 84 %
STRESS POST PEAK BP: NORMAL MMHG
STRESS POST PEAK HR: 100 BPM
STRESS TARGET HR: 119 BPM

## 2019-11-22 PROCEDURE — 0 TECHNETIUM SESTAMIBI: Performed by: NURSE PRACTITIONER

## 2019-11-22 PROCEDURE — 25010000002 REGADENOSON 0.4 MG/5ML SOLUTION: Performed by: NURSE PRACTITIONER

## 2019-11-22 PROCEDURE — A9500 TC99M SESTAMIBI: HCPCS | Performed by: NURSE PRACTITIONER

## 2019-11-22 PROCEDURE — 93017 CV STRESS TEST TRACING ONLY: CPT

## 2019-11-22 PROCEDURE — 93018 CV STRESS TEST I&R ONLY: CPT | Performed by: INTERNAL MEDICINE

## 2019-11-22 PROCEDURE — 78452 HT MUSCLE IMAGE SPECT MULT: CPT | Performed by: INTERNAL MEDICINE

## 2019-11-22 PROCEDURE — 78452 HT MUSCLE IMAGE SPECT MULT: CPT

## 2019-11-22 RX ADMIN — TECHNETIUM TC 99M SESTAMIBI 1 DOSE: 1 INJECTION INTRAVENOUS at 14:15

## 2019-11-22 RX ADMIN — TECHNETIUM TC 99M SESTAMIBI 1 DOSE: 1 INJECTION INTRAVENOUS at 12:50

## 2019-11-22 RX ADMIN — REGADENOSON 0.4 MG: 0.08 INJECTION, SOLUTION INTRAVENOUS at 14:17

## 2019-11-23 ENCOUNTER — TELEPHONE (OUTPATIENT)
Dept: CARDIOLOGY | Facility: HOSPITAL | Age: 79
End: 2019-11-23

## 2019-11-23 NOTE — TELEPHONE ENCOUNTER
Called patient with stress test results which were low risk for ischemia. Patient says she is starting to feel like herself again and shoulder pain and weakness is gone. Patient to see cardiology ASAP. Appt pending. Patient to continue daily weights and take extra lasix for 3lb wt gain in 24 hrs.

## 2019-11-26 ENCOUNTER — TELEPHONE (OUTPATIENT)
Dept: CARDIOLOGY | Facility: HOSPITAL | Age: 79
End: 2019-11-26

## 2019-11-26 NOTE — TELEPHONE ENCOUNTER
Patient left message wanting stress test results.  Relayed message provider message on 11/22/2019.  Patient verbalized understanding and inquired about referral to cardiology advised that there  will be calling her with that appointment.

## 2019-11-27 ENCOUNTER — TELEPHONE (OUTPATIENT)
Dept: CARDIOLOGY | Facility: HOSPITAL | Age: 79
End: 2019-11-27

## 2019-11-27 NOTE — TELEPHONE ENCOUNTER
Patient called and stated that she had a flight that she had booked for [employer] on the 26th and due to her health issues and atrial fibulation she had to cancel. She states that she is needing to have a letter to give to the airport in order to get her money back. She is requesting that it be sent to her.

## 2019-12-05 NOTE — PROGRESS NOTES
"[facility]  INITIAL OFFICE CONSULT    Kenisha Ruvalcaba  : 1940  MRN:[id number]  Patient Address: [address] [place] [id number]    Date of Encounter: 2019    PCP: Lana Flores MD  [address]  [facility] [address]  Referring MD: PATSY Hernandez/ Dr. Swenson     IDENTIFICATION: An 79 y.o. female resident of [place], [place]     No chief complaint on file.  ***    PROBLEM LIST:   1. Pulmonary hypertension  a. Echocardiogram 10/2016: EF >70%, grade II diastolic dysfunction, LA cavity mildly dilated, mild MR, RVSP 45-55 mmHg  b. Echo 2019: LVEF normal, fibrocalcific AV, \"mild\" AS/AI, severe TR with RVSP 95mmHg   2. Paroxysmal atrial fibrillation:  a. Echocardiogram, 09/10/2010: Left atrium 4.6. Moderate left atrial enlargement. Ejection fraction 55%. Severe right atrial enlargement and 2+ tricuspid regurgitation with RVSP 37 mmHg.  b. Coumadin initiation, 2010.  c. Initiation of Tambocor and subsequent external cardioversion, 2010.  d. Echocardiogram, 2010: Moderate mitral regurgitation, moderate tricuspid regurgitation, normal left ventricular size and function, negative bubble study.   e. External cardioversion to sinus rhythm, 2010.  f. CHADS-VASc score equals 4 to 6, on Eliquis  g. Echocardiogram 10/2016: EF >70%, grade II diastolic dysfunction, LA cavity mildly dilated, mild MR, RVSP 45-55 mmHg  h. Recurrent atrial fibrillation despite Flecainide, flecainide discontinued 2018  i. Tikosyn initiation 2018, Metoprolol increased to 100mg BID   j.  S/p successful RFA of the pulmonary veins, atrial tachycardia, right atrial isthmus dependent flutter, and RFA of focal AF and long highly fractionated LA signals, 19  2. Sick sinus syndrome:   a. Patient was hospitalized at [facility], 2016 through 2016 with a 30-day Event monitor.   b. Event recorder 16: 9% AF, " 3.3 sec pauses, HR range from 37 bpm - 151 bpm  c. PM implant 2/17/17- BTK   3. Mild coronary artery disease on cardiac catheterization, 02/06/2013 and C on 6/6/16, normal EF  a. Stress MPS 11/22/2019: no evidence of ischemia, normal LVEF  4. Carotid artery stenosis, status post left CEA:  a. Carotid ultrasound, March 2013, with no significant stenosis.  5. Valvular heart disease:  a. Echo, 01/19/2013: Left ventricular ejection fraction 55% to 60%, mild to moderate mitral regurgitation, RVSP 46 mmHg.   6. Multiple falls  7. Hypertension.   8. Dyslipidemia.   9. Vertigo.   10. Anxiety.   11. Surgical history:  a. Tubal ligation.   b. Left CEA, March 2008.  c. Left Hip replacement     ALLERGIES:   Allergies   Allergen Reactions   • Beta Adrenergic Blockers Dizziness     DIZZINESS    • Sulfa Antibiotics Rash     RASH        CURRENT MEDICATIONS:   Current Outpatient Medications:   •  acetaminophen (TYLENOL) 325 MG tablet, Take 2 tablets by mouth Every 4 (Four) Hours As Needed for Mild Pain ., Disp: , Rfl:   •  aspirin 81 MG EC tablet, Take 81 mg by mouth Every Night., Disp: , Rfl:   •  cholecalciferol (VITAMIN D3) 1000 UNITS tablet, Take 1,000 Units by mouth Daily., Disp: , Rfl:   •  coenzyme Q10 100 MG capsule, Take 100 mg by mouth Daily., Disp: , Rfl:   •  ELIQUIS 5 MG tablet tablet, TAKE 1 BY MOUTH TWICE A DAY, Disp: 60 tablet, Rfl: 6  •  furosemide (LASIX) 20 MG tablet, Take 1 tablet by mouth Daily., Disp: 90 tablet, Rfl: 3  •  levothyroxine (SYNTHROID, LEVOTHROID) 25 MCG tablet, Take 25 mcg by mouth Every Morning., Disp: , Rfl:   •  losartan (COZAAR) 100 MG tablet, Take 0.5 tablets by mouth Daily., Disp: 30 tablet, Rfl: 6  •  meclizine (ANTIVERT) 25 MG tablet, Take 25 mg by mouth 3 (Three) Times a Day As Needed for dizziness., Disp: , Rfl:   •  metoprolol tartrate (LOPRESSOR) 100 MG tablet, Take 1 tablet by mouth 2 (Two) Times a Day., Disp: 60 tablet, Rfl: 11  •  nitroglycerin (NITROSTAT) 0.4 MG SL tablet, Place  0.4 mg under the tongue Every 5 (Five) Minutes As Needed for chest pain. Take no more than 3 doses in 15 minutes., Disp: , Rfl:   •  PARoxetine (PAXIL) 20 MG tablet, Take 10 mg by mouth Every Night., Disp: , Rfl:   •  potassium chloride (K-DUR) 10 MEQ CR tablet, Take 1 tablet by mouth Daily. Take with lasix, Disp: 30 tablet, Rfl: 11  •  rosuvastatin (CRESTOR) 20 MG tablet, Take 20 mg by mouth 2 (Two) Times a Week. Wed and Sat, Disp: , Rfl:   •  traMADol (ULTRAM) 50 MG tablet, Take 50 mg by mouth 3 (Three) Times a Day As Needed., Disp: , Rfl: 2  •  traZODone (DESYREL) 50 MG tablet, TAKE 1-2 TABLETS AT BEDTIME AS NEEDED INSOMNIA, Disp: , Rfl: 5  •  VITAMIN D PO, Take 1 tablet by mouth Daily., Disp: , Rfl:       HPI: ***      Cardiac Risk Factors include: {risk factors heart disease:510}    ROS: All systems have been reviewed and are negative with the exception of those mentioned in the HPI and problem list above.    Surgical History:   Past Surgical History:   Procedure Laterality Date   • BREAST BIOPSY Left     MARKER IN PLACE   • CARDIAC CATHETERIZATION Left 6/16/2016    Procedure: Cardiac catheterization;  Surgeon: Chuck Clark MD;  Location:  [facility] CATH INVASIVE LOCATION;  Service:    • CARDIAC ELECTROPHYSIOLOGY PROCEDURE N/A 2/17/2017    Procedure: Pacemaker DC new;  Surgeon: Darryl Swenson MD;  Location:  [facility] EP INVASIVE LOCATION;  Service:    • CARDIAC ELECTROPHYSIOLOGY PROCEDURE N/A 4/8/2019    Procedure: Ablation atrial fibrillation, hold Tikosyn for 5 days stay on metoprolol.;  Surgeon: Darryl Swenson MD;  Location:  [facility] EP INVASIVE LOCATION;  Service: Cardiovascular   • CAROTID ENDARTERECTOMY Left    • CATARACT EXTRACTION Bilateral    • COLONOSCOPY      every 5-10 years. does not plan to have anymore   • PACEMAKER IMPLANTATION     • TOTAL HIP ARTHROPLASTY Left 1/30/2019    Procedure: TOTAL HIP ARTHROPLASTY LEFT;  Surgeon: Freddie Vale MD;  Location:  [facility] OR;  Service:  Orthopedics   • TUBAL ABDOMINAL LIGATION         Social History:   Social History     Socioeconomic History   • Marital status:      Spouse name: Not on file   • Number of children: Not on file   • Years of education: Not on file   • Highest education level: Not on file   Occupational History   • Occupation: retired   Tobacco Use   • Smoking status: Never Smoker   • Smokeless tobacco: Never Used   Substance and Sexual Activity   • Alcohol use: Yes     Alcohol/week: 0.6 oz     Types: 1 Glasses of wine per week     Frequency: Monthly or less     Comment: occas   • Drug use: No   • Sexual activity: Defer   Social History Narrative    Denies caffeine use. Lives at home with .        Family History:   Family History   Problem Relation Age of Onset   • Alzheimer's disease Mother    • Cancer Mother    • Aortic aneurysm Father    • Heart valve disorder Sister        Objective     There were no vitals filed for this visit.  There is no height or weight on file to calculate BMI.    PHYSICAL EXAM:  CONSTITUTIONAL: Well nourished, cooperative, in no acute distress  HEENT: Normocephalic, atraumatic, PERRLA, no JVD, no carotid bruit  CARDIOVASCULAR:  Regular rhythm and normal rate, no murmur, gallop, rub. Peripheral pulses are present and equal bilaterally  RESPIRATORY: Clear to auscultation, normal respiratory effort, no wheezing, rales or ronchi  GI: Soft, nontender, normal bowel sounds  MUSCULOSKELETAL: No gross deformities, no edema  SKIN: Warm, dry. No bleeding, bruising or rash  NEUROLOGICAL: No focal deficits  PSYCHIATRIC: Normal mood and affect. Behavior is normal     Labs/Diagnostic Data  Lab Results   Component Value Date    WBC 6.26 04/07/2019    HGB 11.8 04/07/2019    HCT 37.5 04/07/2019    MCV 88.7 04/07/2019     04/07/2019     Lab Results   Component Value Date    GLUCOSE 105 (H) 11/20/2019    BUN 28 (H) 11/20/2019    CREATININE 1.55 (H) 11/20/2019    EGFRIFNONA 32 (L) 11/20/2019    BCR 18.1  11/20/2019    K 3.9 11/20/2019    CO2 35.3 (H) 11/20/2019    CALCIUM 9.9 11/20/2019    ALBUMIN 4.65 01/30/2019    AST 31 01/30/2019    ALT 30 01/30/2019     Lab Results   Component Value Date    HGBA1C 5.80 (H) 04/07/2019     Lab Results   Component Value Date    CHOL 167 06/16/2016    CHLPL 194 04/28/2016    TRIG 99 06/16/2016    HDL 40 06/16/2016    LDL 94 06/16/2016     Lab Results   Component Value Date    TSH 4.980 (H) 04/07/2019     proBNP 11/20/2019: 1046    Procedures    Radiology Data:   CXR 11/20/2019:  IMPRESSION:  Very small pleural effusions and minimal left basilar  atelectasis. No evidence of overt congestive failure currently, or other  evidence of active disease.      Assessment and Plan:     1. ***    Thank you for allowing me to participate in the care of Kenisha Ruvalcaba. Feel free to contact me directly with any further questions or concerns.    Scribed for Sang Sosa MD by Radha Andrews PA-C. 12/5/2019  6:02 PM

## 2019-12-06 ENCOUNTER — CONSULT (OUTPATIENT)
Dept: CARDIOLOGY | Facility: CLINIC | Age: 79
End: 2019-12-06

## 2019-12-06 VITALS
DIASTOLIC BLOOD PRESSURE: 86 MMHG | BODY MASS INDEX: 26.44 KG/M2 | HEIGHT: 63 IN | SYSTOLIC BLOOD PRESSURE: 130 MMHG | HEART RATE: 93 BPM | WEIGHT: 149.2 LBS

## 2019-12-06 DIAGNOSIS — I49.5 TACHYCARDIA-BRADYCARDIA SYNDROME (HCC): ICD-10-CM

## 2019-12-06 DIAGNOSIS — I10 ESSENTIAL HYPERTENSION: ICD-10-CM

## 2019-12-06 DIAGNOSIS — I48.0 PAROXYSMAL ATRIAL FIBRILLATION (HCC): ICD-10-CM

## 2019-12-06 DIAGNOSIS — I50.32 CHRONIC DIASTOLIC CONGESTIVE HEART FAILURE (HCC): Primary | ICD-10-CM

## 2019-12-06 DIAGNOSIS — E78.5 HYPERLIPIDEMIA LDL GOAL <70: ICD-10-CM

## 2019-12-06 PROCEDURE — 93000 ELECTROCARDIOGRAM COMPLETE: CPT | Performed by: INTERNAL MEDICINE

## 2019-12-06 PROCEDURE — 99215 OFFICE O/P EST HI 40 MIN: CPT | Performed by: INTERNAL MEDICINE

## 2019-12-06 NOTE — PROGRESS NOTES
"[facility] Cardiology    Subjective:     Encounter Date:12/06/2019      Patient ID: Kenisha Ruvalcaba is a 79 y.o. female.    Reason for consultation:    Problem List:  1. Pulmonary hypertension  a. Echocardiogram 10/2016: EF >70%, grade II diastolic dysfunction, LA cavity mildly dilated, mild MR, RVSP 45-55 mmHg  b. Echo 11/13/2019: LVEF normal, fibrocalcific AV, \"mild\" AS/AI, severe TR with RVSP 95mmHg   2. Paroxysmal atrial fibrillation:  a. Echocardiogram, 09/10/2010: Left atrium 4.6. Moderate left atrial enlargement. Ejection fraction 55%. Severe right atrial enlargement and 2+ tricuspid regurgitation with RVSP 37 mmHg.  b. Coumadin initiation, 09/07/2010.  c. Initiation of Tambocor and subsequent external cardioversion, 12/03/2010.  d. Echocardiogram, 12/03/2010: Moderate mitral regurgitation, moderate tricuspid regurgitation, normal left ventricular size and function, negative bubble study.   e. External cardioversion to sinus rhythm, December 2010.  f. CHADS-VASc score equals 4 to 6, on Eliquis  g. Echocardiogram 10/2016: EF >70%, grade II diastolic dysfunction, LA cavity mildly dilated, mild MR, RVSP 45-55 mmHg  h. Recurrent atrial fibrillation despite Flecainide, flecainide discontinued 09/2018  i. Tikosyn initiation 09/2018, Metoprolol increased to 100mg BID 1/19  j.  S/p successful RFA of the pulmonary veins, atrial tachycardia, right atrial isthmus dependent flutter, and RFA of focal AF and long highly fractionated LA signals, 4/8/19  2. Sick sinus syndrome:   a. Patient was hospitalized at [facility], 04/26/2016 through 04/28/2016 with a 30-day Event monitor.   b. Event recorder 12/16/16: 9% AF, 3.3 sec pauses, HR range from 37 bpm - 151 bpm  c. PM implant 2/17/17- BTK   3. Mild coronary artery disease on cardiac catheterization, 02/06/2013 and LHC on 6/6/16, normal EF  a. Stress MPS 11/22/2019: no evidence of ischemia, normal LVEF  4. Carotid artery stenosis, " status post left CEA:  a. Carotid ultrasound, March 2013, with no significant stenosis.  5. Valvular heart disease:  a. Echo, 01/19/2013: Left ventricular ejection fraction 55% to 60%, mild to moderate mitral regurgitation, RVSP 46 mmHg.   6. Multiple falls  7. Hypertension.   8. Dyslipidemia.   9. Vertigo.   10. Anxiety.   11. Surgical history:  a. Tubal ligation.   b. Left CEA, March 2008.  c. Left Hip replacement     Problem   Chronic Diastolic Congestive Heart Failure (Cms/Hcc)       Allergies   Allergen Reactions   • Beta Adrenergic Blockers Dizziness     DIZZINESS    • Sulfa Antibiotics Rash     RASH          Current Outpatient Medications:   •  acetaminophen (TYLENOL) 325 MG tablet, Take 2 tablets by mouth Every 4 (Four) Hours As Needed for Mild Pain ., Disp: , Rfl:   •  aspirin 81 MG EC tablet, Take 81 mg by mouth Every Night., Disp: , Rfl:   •  cholecalciferol (VITAMIN D3) 1000 UNITS tablet, Take 1,000 Units by mouth Daily., Disp: , Rfl:   •  coenzyme Q10 100 MG capsule, Take 100 mg by mouth Daily., Disp: , Rfl:   •  ELIQUIS 5 MG tablet tablet, TAKE 1 BY MOUTH TWICE A DAY, Disp: 60 tablet, Rfl: 6  •  furosemide (LASIX) 20 MG tablet, Take 1 tablet by mouth Daily., Disp: 90 tablet, Rfl: 3  •  levothyroxine (SYNTHROID, LEVOTHROID) 25 MCG tablet, Take 25 mcg by mouth Every Morning., Disp: , Rfl:   •  losartan (COZAAR) 100 MG tablet, Take 0.5 tablets by mouth Daily., Disp: 30 tablet, Rfl: 6  •  meclizine (ANTIVERT) 25 MG tablet, Take 25 mg by mouth 3 (Three) Times a Day As Needed for dizziness., Disp: , Rfl:   •  metoprolol tartrate (LOPRESSOR) 100 MG tablet, Take 1 tablet by mouth 2 (Two) Times a Day., Disp: 60 tablet, Rfl: 11  •  nitroglycerin (NITROSTAT) 0.4 MG SL tablet, Place 0.4 mg under the tongue Every 5 (Five) Minutes As Needed for chest pain. Take no more than 3 doses in 15 minutes., Disp: , Rfl:   •  PARoxetine (PAXIL) 20 MG tablet, Take 20 mg by mouth Every Night., Disp: , Rfl:   •  potassium  "chloride (K-DUR) 10 MEQ CR tablet, Take 1 tablet by mouth Daily. Take with lasix, Disp: 30 tablet, Rfl: 11  •  rosuvastatin (CRESTOR) 20 MG tablet, Take 20 mg by mouth 2 (Two) Times a Week. Wed and Sat, Disp: , Rfl:   •  traMADol (ULTRAM) 50 MG tablet, Take 50 mg by mouth 3 (Three) Times a Day As Needed., Disp: , Rfl: 2  •  traZODone (DESYREL) 50 MG tablet, TAKE 1-2 TABLETS AT BEDTIME AS NEEDED INSOMNIA, Disp: , Rfl: 5  •  VITAMIN D PO, Take 1 tablet by mouth Daily., Disp: , Rfl:     HPI:      Kenisha Ruvalcaba is a 79 y.o. female who present today to establish care for recent onset of CHF type symptoms.  She is an established patient of Dr. Riccis, who sees her for PAF and SSS. [facility] shortness of breath, fluid overload, and fatigue and was noted to have pleural effusions.  Subsequent work-up included a negative myocardial perfusion study and echocardiogram showing severe tricuspid regurgitation and severe pulmonary hypertension with normal EF, there was mild aortic stenosis/regurgitation and mild to moderate mitral regurgitation.  She was started on Lasix with significant improvement.,  Shortness of breath has improved, edema has resolved. She still gets tired very easily, even when walking short distances, but no longer experiences significant shortness of breath. She reports occasional heart fluttering and dizziness. Pt admits she gets anxious easily, which could contribute to her symptoms. Patient denies chest pain, and syncope. Pt has been on levothyroxine for \"years\".    Cardiac Risk Factors:    Social History     Socioeconomic History   • Marital status:      Spouse name: Not on file   • Number of children: Not on file   • Years of education: Not on file   • Highest education level: Not on file   Occupational History   • Occupation: retired   Tobacco Use   • Smoking status: Never Smoker   • Smokeless tobacco: Never Used   Substance and Sexual " "Activity   • Alcohol use: Yes     Alcohol/week: 0.6 oz     Types: 1 Glasses of wine per week     Frequency: Monthly or less     Comment: occas   • Drug use: No   • Sexual activity: Defer   Social History Narrative    Denies caffeine use. Lives at home with .      Family History   Problem Relation Age of Onset   • Alzheimer's disease Mother    • Cancer Mother    • Aortic aneurysm Father    • Heart valve disorder Sister        Review of Systems:    Constitution: Negative for chills, fever, generalized weakness, weight gain and weight loss. Positive for fatigue  Cardiovascular: Negative for chest pain, claudication, dyspnea on exertion, leg swelling, orthopnea, palpitations, paroxysmal nocturnal dyspnea and syncope.   Respiratory: Negative for cough, shortness of breath, and wheezing.  HENT: Negative for ear pain, nosebleeds, and tinnitus.  Gastrointestinal: Negative for abdominal pain, constipation, diarrhea, nausea and vomiting.   Genitourinary: No urinary symptoms   Musculoskeletal: Negative for muscle cramps.  Neurological: Negative for dizziness, headaches, loss of balance, numbness, and symptoms of stroke.  Psychiatric: Positive for anxiety    The following portions of the patient's history were reviewed and updated as appropriate: allergies, current medications and problem list.    Objective:        Vitals:    12/06/19 1239   BP: 130/86   BP Location: Right arm   Patient Position: Sitting   Pulse: 93   Weight: 67.7 kg (149 lb 3.2 oz)   Height: 160 cm (63\")        General Appearance:    Alert, cooperative, in no acute distress   Head:    Normocephalic, without obvious abnormality, atraumatic   Eyes:            Lids and lashes normal, conjunctivae and sclerae normal, no   icterus, no pallor, corneas clear, PERRLA   Ears:    Ears appear intact with no abnormalities noted   Throat:   No oral lesions, no thrush, oral mucosa moist   Neck:   No adenopathy, supple, trachea midline, no thyromegaly, no     carotid " bruit, no JVD   Back:     No kyphosis present, no scoliosis present, no skin lesions,       erythema or scars, no tenderness to percussion or                   palpation,   range of motion normal   Lungs:     Clear to auscultation,respirations regular, even and                   unlabored    Heart:    Regular rhythm and normal rate, normal S1 and S2, no gallop, no rub, no click. Faint systolic murmur heard.   Breast Exam:    Deferred   Abdomen:     Normal bowel sounds, no masses, no organomegaly, soft        non-tender, non-distended, no guarding, no rebound                 tenderness   Genitalia:    Deferred   Extremities:   Moves all extremities well, no edema, no cyanosis, no              redness   Pulses:   Pulses palpable and equal bilaterally   Skin:   No bleeding, bruising or rash   Lymph nodes:   No palpable adenopathy   Neurologic:   Cranial nerves 2 - 12 grossly intact, sensation intact, DTR        present and equal bilaterally     Hospital Outpatient Visit on 11/22/2019   Component Date Value Ref Range Status   • Target HR (85%) 11/22/2019 119  bpm Final   • Max. Pred. HR (100%) 11/22/2019 140  bpm Final   • BH CV STRESS PROTOCOL 1 11/22/2019 Pharmacologic   Final   • Stage 1 11/22/2019 1   Final   • Duration Min Stage 1 11/22/2019 1   Final   • Duration Sec Stage 1 11/22/2019 10   Final   • Stress Dose Regadenoson Stage 1 11/22/2019 0.4   Final   • Stress Comments Stage 1 11/22/2019 10 sec bolus injection   Final   • Baseline HR 11/22/2019 88  bpm Final   • Baseline BP 11/22/2019 122/80  mmHg Final   • HR Stage 1 11/22/2019 88   Final   • Stage 2 11/22/2019 2   Final   • HR Stage 2 11/22/2019 99   Final   • BP Stage 2 11/22/2019 132/60   Final   • Duration Min Stage 2 11/22/2019 1   Final   • Duration Sec Stage 2 11/22/2019 0   Final   • Stage 3 11/22/2019 3   Final   • HR Stage 3 11/22/2019 101   Final   • BP Stage 3 11/22/2019 148/68   Final   • Duration Min Stage 3 11/22/2019 1   Final   • Stage 4  "11/22/2019 4   Final   • HR Stage 4 11/22/2019 92   Final   • Duration Min Stage 4 11/22/2019 1   Final   • Peak HR 11/22/2019 100  bpm Final   • Percent Max Pred HR 11/22/2019 71.43  % Final   • Percent Target HR 11/22/2019 84  % Final   • Peak BP 11/22/2019 148/68  mmHg Final   • Recovery HR 11/22/2019 86  bpm Final   • Recovery BP 11/22/2019 120/60  mmHg Final   • Nuc Stress EF 11/22/2019 76  % Final         Diagnostic Data:      proBNP 11/20/2019: 1046  Lab Results   Component Value Date    GLUCOSE 105 (H) 11/20/2019    BUN 28 (H) 11/20/2019    CREATININE 1.55 (H) 11/20/2019    EGFRIFNONA 32 (L) 11/20/2019    BCR 18.1 11/20/2019    K 3.9 11/20/2019    CO2 35.3 (H) 11/20/2019    CALCIUM 9.9 11/20/2019    ALBUMIN 4.65 01/30/2019    AST 31 01/30/2019    ALT 30 01/30/2019     Lab Results   Component Value Date    WBC 6.26 04/07/2019    HGB 11.8 04/07/2019    HCT 37.5 04/07/2019    MCV 88.7 04/07/2019     04/07/2019     Lab Results   Component Value Date    TSH 4.980 (H) 04/07/2019      Lab Results   Component Value Date    HGBA1C 5.80 (H) 04/07/2019        Radiology Data:   CXR 11/20/2019:  IMPRESSION:  Very small pleural effusions and minimal left basilar  atelectasis. No evidence of overt congestive failure currently, or other  evidence of active disease.    Echocardiogram, 11/13/2019:  · Fibrocalcific changes are noted in the aortic valve.  · There is \"mild\" aortic stenosis/aortic insufficiency.  · There are no other important findings on this study.    Stress test with Myocardial Perfusion, 11/22/2019:  · Patient complained of mid scapular pain with Lexiscan infusion this resolved in recovery  · Baseline EKG sinus rhythm normal axis. With stress she is intermittently atrial paced there are no ischemic ST changes  · No TID  · Myocardial perfusion imaging shows a moderate size, moderate intensity apical defect which is fixed  · There is normal wall motion and contractility the apex, perfusion findings look " "to be from soft tissue attenuation artifact from the patient's left arm.  · Left ventricular ejection fraction is hyperdynamic (Calculated EF > 70%).  · CT portion of the exam shows moderate LAD territory calcification  · No evidence of ischemia, normal contractility at the apex no evidence of scar  · Low risk study        ECG 12 Lead  Date/Time: 12/6/2019 1:04 PM  Performed by: Sang Sosa MD  Authorized by: Sang Sosa MD   Rhythm: sinus tachycardia  BPM: 93    Clinical impression: abnormal EKG  Comments: Poor \"R\" wave progression            Assessment:       Plan   1. Chronic diastolic congestive heart failure (CMS/HCC) presenting with symptoms of exertional fatigue and dyspnea. Increase metoprolol from 100 to 150 mg BID for better rate control.Continue all other current medications     2. Paroxysmal atrial fibrillation (CMS/HCC) Continue Eliquis 5 mg BID for stroke prophylaxis.   3. Tachycardia-bradycardia syndrome (CMS/HCC), status post PPM. Device monitored by Dr. Swenson.   4. Hyperlipidemia LDL goal <70 Managed by PCP.   5. Essential hypertension Well-controlled, change metoprolol as above. Continue all other current medications.      Chest X-ray, echocardiogram, stress test, and ECG found above were all reviewed with the patient and her daughter.    Plan:   1. The patient has combined diastolic CHF and right heart failure, this is in the absence of significant known lung disease.  She is already on anticoagulation and her symptoms have improved after diuresis.  2. At this time we discussed the findings of above work-up and recommended medical therapy.  3. We will increase metoprolol from 100 to 150 mg twice daily for better control of heart rate.  4. Also increase Synthroid dose to 50 mcg as some of the fatigue symptoms may be related to ongoing hypothyroidism.  5. Continue Lasix and rest of the current medications which include aspirin losartan rosuvastatin and Eliquis.  6. Follow-up in 1 month, " depending on clinical course we will consider repeating an echocardiogram to reassess her pulmonary pressures and if no significant improvement she would need right heart catheterization and consideration of starting Revatio.  7. Thank you for allowing us to participate in the care of your patient.    Scribed for Sang Sosa MD by Alison Jean-Baptiste. 12/6/2019  12:58 PM      ISang MD, personally performed the services described in this documentation as scribed by the above named individual in my presence, and it is both accurate and complete.  12/6/2019  4:31 PM

## 2019-12-26 RX ORDER — LOSARTAN POTASSIUM 100 MG/1
TABLET ORAL
Qty: 90 TABLET | Refills: 3 | Status: SHIPPED | OUTPATIENT
Start: 2019-12-26 | End: 2021-01-12

## 2020-01-17 ENCOUNTER — TELEPHONE (OUTPATIENT)
Dept: CARDIOLOGY | Facility: CLINIC | Age: 80
End: 2020-01-17

## 2020-01-17 NOTE — TELEPHONE ENCOUNTER
Spoke with patient regarding possible Feb implant for AURORA closure. She states she would like to move forward with Feb implant date if Dr. Sosa is agreeable at next weeks visit. She is still recovering from hematomas cause by earlier fall and is anxious to stop anticoagulation.

## 2020-01-24 ENCOUNTER — OFFICE VISIT (OUTPATIENT)
Dept: CARDIOLOGY | Facility: CLINIC | Age: 80
End: 2020-01-24

## 2020-01-24 VITALS
SYSTOLIC BLOOD PRESSURE: 124 MMHG | DIASTOLIC BLOOD PRESSURE: 60 MMHG | HEART RATE: 93 BPM | WEIGHT: 143.8 LBS | OXYGEN SATURATION: 96 % | BODY MASS INDEX: 25.48 KG/M2 | HEIGHT: 63 IN

## 2020-01-24 DIAGNOSIS — E78.5 DYSLIPIDEMIA: ICD-10-CM

## 2020-01-24 DIAGNOSIS — I27.20 PULMONARY HTN (HCC): ICD-10-CM

## 2020-01-24 DIAGNOSIS — I38 HEART VALVE DISEASE: ICD-10-CM

## 2020-01-24 DIAGNOSIS — I25.119 CORONARY ARTERY DISEASE INVOLVING NATIVE CORONARY ARTERY OF NATIVE HEART WITH ANGINA PECTORIS (HCC): Primary | ICD-10-CM

## 2020-01-24 DIAGNOSIS — I10 ESSENTIAL HYPERTENSION: ICD-10-CM

## 2020-01-24 PROBLEM — G89.18 ACUTE POSTOPERATIVE PAIN: Status: RESOLVED | Noted: 2019-01-31 | Resolved: 2020-01-24

## 2020-01-24 PROBLEM — D62 ACUTE BLOOD LOSS ANEMIA: Status: RESOLVED | Noted: 2019-01-31 | Resolved: 2020-01-24

## 2020-01-24 PROBLEM — E87.6 HYPOKALEMIA: Status: RESOLVED | Noted: 2019-01-30 | Resolved: 2020-01-24

## 2020-01-24 PROCEDURE — 99214 OFFICE O/P EST MOD 30 MIN: CPT | Performed by: PHYSICIAN ASSISTANT

## 2020-01-24 RX ORDER — VALSARTAN AND HYDROCHLOROTHIAZIDE 160; 25 MG/1; MG/1
TABLET ORAL
Status: ON HOLD | COMMUNITY
End: 2020-01-30

## 2020-01-24 RX ORDER — METOPROLOL TARTRATE 100 MG/1
150 TABLET ORAL 2 TIMES DAILY
COMMUNITY
End: 2020-02-07

## 2020-01-24 NOTE — PROGRESS NOTES
"[facility] Cardiology    Encounter Date:2020        Patient ID: Kenisha Ruvalcaba is a 79 y.o. female.  : 1940     PCP: Lana Flores MD     Chief Complaint: Congestive Heart Failure and Atrial Fibrillation      PROBLEM LIST:  1. Pulmonary hypertension  a. Echocardiogram 10/2016: EF >70%, grade II diastolic dysfunction, LA cavity mildly dilated, mild MR, RVSP 45-55 mmHg  b. Echo 2019: LVEF normal, fibrocalcific AV, \"mild\" AS/AI, severe TR with RVSP 95mmHg   2. Paroxysmal atrial fibrillation:  a.  S/p successful RFA of the pulmonary veins, atrial tachycardia, right atrial isthmus dependent flutter, and RFA of focal AF and long highly fractionated LA signals, 19  2. Sick sinus syndrome:   a. PM implant 17- BTK   3. Mild coronary artery disease on cardiac catheterization, 2013 and LHC on 16, normal EF  a. Stress MPS 2019: no evidence of ischemia, normal LVEF  4. Carotid artery stenosis, status post left CEA:  a. Carotid ultrasound, 2013, with no significant stenosis.  5. Valvular heart disease:     1. Echo 19:   · Fibrocalcific changes are noted in the aortic valve.  · There is \"mild\" aortic stenosis/aortic insufficiency.  · Severe tricuspid valve regurgitation  6. Multiple falls  7. Hypertension.   8. Dyslipidemia.   9. Vertigo.   10. Anxiety.   11. Surgical history:  a. Tubal ligation.   b. Left CEA, 2008.  c. Left Hip replacement     History of Present Illness  Patient presents today for right-sided heart failure, pulmonary hypertension, valvular heart disease and hypertension.  At last visit with our service her metoprolol tartrate was increased from 100 to 150 mg twice daily.  She returns today for scheduled follow-up.  She has no current complaint of chest pain however continues to have moderate to severe dyspnea at rest worsened by ambulation.  She comes in with a record of her blood pressures which average between " 120 to 130 mmHg systolic.  Her last available lipid panel is from May 2019 and was highly favorable on her current medical regimen.  She has no awareness of tachyarrhythmias, no dizziness or syncope.  She denies orthopnea, PND, claudication, lower extremity edema.  Her daughter states that she both snores and stops breathing at night for several seconds.  She denies history of prior sleep study.  She has rare awareness of palpitations which are self-limiting.  She is compliant with her current medical regimen ports no significant side effects.    Allergies   Allergen Reactions   • Beta Adrenergic Blockers Dizziness     DIZZINESS    • Sulfa Antibiotics Rash     RASH          Current Outpatient Medications:   •  acetaminophen (TYLENOL) 325 MG tablet, Take 2 tablets by mouth Every 4 (Four) Hours As Needed for Mild Pain ., Disp: , Rfl:   •  aspirin 81 MG EC tablet, Take 81 mg by mouth Every Night., Disp: , Rfl:   •  cholecalciferol (VITAMIN D3) 1000 UNITS tablet, Take 1,000 Units by mouth Daily., Disp: , Rfl:   •  coenzyme Q10 100 MG capsule, Take 100 mg by mouth Daily., Disp: , Rfl:   •  ELIQUIS 5 MG tablet tablet, TAKE 1 BY MOUTH TWICE A DAY, Disp: 60 tablet, Rfl: 6  •  furosemide (LASIX) 20 MG tablet, Take 1 tablet by mouth Daily., Disp: 90 tablet, Rfl: 3  •  levothyroxine (SYNTHROID, LEVOTHROID) 25 MCG tablet, Take 25 mcg by mouth Every Morning., Disp: , Rfl:   •  losartan (COZAAR) 100 MG tablet, TAKE 1 TABLET BY MOUTH EVERY DAY, Disp: 90 tablet, Rfl: 3  •  meclizine (ANTIVERT) 25 MG tablet, Take 25 mg by mouth 3 (Three) Times a Day As Needed for dizziness., Disp: , Rfl:   •  metoprolol tartrate (LOPRESSOR) 100 MG tablet, Take 1 tablet by mouth 2 (Two) Times a Day., Disp: 60 tablet, Rfl: 11  •  nitroglycerin (NITROSTAT) 0.4 MG SL tablet, Place 0.4 mg under the tongue Every 5 (Five) Minutes As Needed for chest pain. Take no more than 3 doses in 15 minutes., Disp: , Rfl:   •  PARoxetine (PAXIL) 20 MG tablet, Take 20  "mg by mouth Every Night., Disp: , Rfl:   •  potassium chloride (K-DUR) 10 MEQ CR tablet, Take 1 tablet by mouth Daily. Take with lasix, Disp: 30 tablet, Rfl: 11  •  rosuvastatin (CRESTOR) 20 MG tablet, Take 20 mg by mouth 2 (Two) Times a Week. Wed and Sat, Disp: , Rfl:   •  traMADol (ULTRAM) 50 MG tablet, Take 50 mg by mouth 3 (Three) Times a Day As Needed., Disp: , Rfl: 2  •  traZODone (DESYREL) 50 MG tablet, TAKE 1-2 TABLETS AT BEDTIME AS NEEDED INSOMNIA, Disp: , Rfl: 5  •  valsartan-hydrochlorothiazide (DIOVAN-HCT) 160-25 MG per tablet, valsartan 160 mg-hydrochlorothiazide 25 mg tablet, Disp: , Rfl:   •  VITAMIN D PO, Take 1 tablet by mouth Daily., Disp: , Rfl:     The following portions of the patient's history were reviewed and updated as appropriate: allergies, current medications, past family history, past medical history, past social history, past surgical history and problem list.    ROS  Review of Systems   Constitution: Negative for chills, fatigue, fever, generalized weakness.   Cardiovascular: Negative for chest pain, claudication, leg swelling, orthopnea, paroxysmal nocturnal dyspnea and syncope.   Respiratory: Negative for cough, and wheezing.  HENT: Negative for ear pain, nosebleeds, and tinnitus.  Gastrointestinal: Negative for abdominal pain, constipation, diarrhea, nausea and vomiting.   Genitourinary: No urinary symptoms.  Musculoskeletal: Negative for muscle cramps.  Neurological: Negative for dizziness, headaches, loss of balance, numbness, and symptoms of stroke.  Psychiatric: Normal mental status.     All other systems reviewed and are negative.    Objective:     /60 (BP Location: Right arm, Patient Position: Sitting)   Pulse 93   Ht 160 cm (63\")   Wt 65.2 kg (143 lb 12.8 oz)   LMP  (LMP Unknown)   SpO2 96%   BMI 25.47 kg/m²          Physical Exam  Constitutional: Patient appears well-developed and well-nourished.   HENT: HEENT exam unremarkable.   Neck: Neck supple. No JVD " present. No carotid bruits.   Cardiovascular: Normal rate, regular rhythm and normal heart sounds. No murmur heard.   2+ symmetric pulses.   Pulmonary/Chest: Breath sounds normal. Does not exhibit tenderness.   Abdominal: Abdomen benign.   Musculoskeletal: Does not exhibit edema.   Neurological: Neurological exam unremarkable.   Vitals reviewed.    Lab Review:   Labs from primary care dated 5/7/2019:  Total cholesterol 125, triglycerides 103, HDL 42, LDL 62      Procedures       Assessment:      Diagnosis Plan   1. Coronary artery disease involving native coronary artery of native heart with angina pectoris (CMS/[facility])   no current anginal symptoms   2. Pulmonary HTN (CMS/[facility])  Case Request Cath Lab: Right Heart Cath   3. Heart valve disease   right heart catheterization   4. Essential hypertension   currently well managed on current medical regimen   5. Dyslipidemia   well managed current medical regimen   6.  Right-sided congestive heart failure                             right heart catheterization  Plan:     Stable cardiac status.  Continue current medications.   Thank you for allowing us to participate in the care of your patient.         Electronically signed by JOSI Ortiz, 01/24/20, 11:28 AM.      Please note that portions of this note may have been completed with a voice recognition program. Efforts were made to edit the dictations, but occasionally words are mistranscribed.

## 2020-01-27 ENCOUNTER — PREP FOR SURGERY (OUTPATIENT)
Dept: OTHER | Facility: HOSPITAL | Age: 80
End: 2020-01-27

## 2020-01-27 DIAGNOSIS — I27.20 PULMONARY HTN (HCC): Primary | ICD-10-CM

## 2020-01-27 RX ORDER — ASPIRIN 325 MG
325 TABLET ORAL ONCE
Status: CANCELLED | OUTPATIENT
Start: 2020-01-27 | End: 2020-01-27

## 2020-01-27 RX ORDER — NITROGLYCERIN 0.4 MG/1
0.4 TABLET SUBLINGUAL
Status: CANCELLED | OUTPATIENT
Start: 2020-01-27

## 2020-01-27 RX ORDER — SODIUM CHLORIDE 0.9 % (FLUSH) 0.9 %
3 SYRINGE (ML) INJECTION EVERY 12 HOURS SCHEDULED
Status: CANCELLED | OUTPATIENT
Start: 2020-01-27

## 2020-01-27 RX ORDER — ASPIRIN 325 MG
325 TABLET, DELAYED RELEASE (ENTERIC COATED) ORAL DAILY
Status: CANCELLED | OUTPATIENT
Start: 2020-01-28

## 2020-01-27 RX ORDER — ONDANSETRON 2 MG/ML
4 INJECTION INTRAMUSCULAR; INTRAVENOUS EVERY 8 HOURS PRN
Status: CANCELLED | OUTPATIENT
Start: 2020-01-27

## 2020-01-27 RX ORDER — SODIUM CHLORIDE 0.9 % (FLUSH) 0.9 %
10 SYRINGE (ML) INJECTION AS NEEDED
Status: CANCELLED | OUTPATIENT
Start: 2020-01-27

## 2020-01-30 ENCOUNTER — HOSPITAL ENCOUNTER (OUTPATIENT)
Facility: HOSPITAL | Age: 80
Discharge: HOME | End: 2020-01-30
Attending: INTERNAL MEDICINE | Admitting: INTERNAL MEDICINE

## 2020-01-30 VITALS
WEIGHT: 144.84 LBS | SYSTOLIC BLOOD PRESSURE: 133 MMHG | HEIGHT: 68 IN | BODY MASS INDEX: 21.95 KG/M2 | DIASTOLIC BLOOD PRESSURE: 71 MMHG | RESPIRATION RATE: 16 BRPM | TEMPERATURE: 98 F | OXYGEN SATURATION: 95 % | HEART RATE: 83 BPM

## 2020-01-30 DIAGNOSIS — I27.20 PULMONARY HTN (HCC): ICD-10-CM

## 2020-01-30 LAB
ANION GAP SERPL CALCULATED.3IONS-SCNC: 13 MMOL/L (ref 5–15)
ATMOSPHERIC PRESS: ABNORMAL MM[HG]
ATMOSPHERIC PRESS: ABNORMAL MM[HG]
BASE EXCESS BLDV CALC-SCNC: 6.3 MMOL/L (ref -2–2)
BASE EXCESS BLDV CALC-SCNC: 7 MMOL/L (ref -2–2)
BASOPHILS # BLD AUTO: 0.02 10*3/MM3 (ref 0–0.2)
BASOPHILS NFR BLD AUTO: 0.4 % (ref 0–1.5)
BDY SITE: ABNORMAL
BDY SITE: ABNORMAL
BODY TEMPERATURE: 37 C
BODY TEMPERATURE: 37 C
BUN BLD-MCNC: 18 MG/DL (ref 8–23)
BUN/CREAT SERPL: 21.2 (ref 7–25)
CALCIUM SPEC-SCNC: 9.7 MG/DL (ref 8.6–10.5)
CHLORIDE SERPL-SCNC: 101 MMOL/L (ref 98–107)
CHOLEST SERPL-MCNC: 108 MG/DL (ref 0–200)
CO2 BLDA-SCNC: 33.7 MMOL/L (ref 22–33)
CO2 BLDA-SCNC: 34.5 MMOL/L (ref 22–33)
CO2 SERPL-SCNC: 28 MMOL/L (ref 22–29)
COHGB MFR BLD: 1.7 %
COHGB MFR BLD: 1.8 %
CREAT BLD-MCNC: 0.85 MG/DL (ref 0.57–1)
DEPRECATED RDW RBC AUTO: 66.7 FL (ref 37–54)
EOSINOPHIL # BLD AUTO: 0.11 10*3/MM3 (ref 0–0.4)
EOSINOPHIL NFR BLD AUTO: 2.2 % (ref 0.3–6.2)
EPAP: 0
EPAP: 0
ERYTHROCYTE [DISTWIDTH] IN BLOOD BY AUTOMATED COUNT: 23.2 % (ref 12.3–15.4)
GFR SERPL CREATININE-BSD FRML MDRD: 64 ML/MIN/1.73
GLUCOSE BLD-MCNC: 110 MG/DL (ref 65–99)
HCO3 BLDV-SCNC: 32.1 MMOL/L (ref 22–28)
HCO3 BLDV-SCNC: 32.9 MMOL/L (ref 22–28)
HCT VFR BLD AUTO: 37.2 % (ref 34–46.6)
HDLC SERPL-MCNC: 40 MG/DL (ref 40–60)
HGB BLD-MCNC: 11.1 G/DL (ref 12–15.9)
HGB BLDA-MCNC: 10.6 G/DL (ref 14–18)
HGB BLDA-MCNC: 10.6 G/DL (ref 14–18)
HOROWITZ INDEX BLD+IHG-RTO: 21 %
HOROWITZ INDEX BLD+IHG-RTO: 21 %
IMM GRANULOCYTES # BLD AUTO: 0.02 10*3/MM3 (ref 0–0.05)
IMM GRANULOCYTES NFR BLD AUTO: 0.4 % (ref 0–0.5)
IPAP: 0
IPAP: 0
LDLC SERPL CALC-MCNC: 53 MG/DL (ref 0–100)
LDLC/HDLC SERPL: 1.34 {RATIO}
LYMPHOCYTES # BLD AUTO: 1.74 10*3/MM3 (ref 0.7–3.1)
LYMPHOCYTES NFR BLD AUTO: 34.1 % (ref 19.6–45.3)
MCH RBC QN AUTO: 24.1 PG (ref 26.6–33)
MCHC RBC AUTO-ENTMCNC: 29.8 G/DL (ref 31.5–35.7)
MCV RBC AUTO: 80.9 FL (ref 79–97)
METHGB BLD QL: 0.8 %
METHGB BLD QL: 0.9 %
MODALITY: ABNORMAL
MODALITY: ABNORMAL
MONOCYTES # BLD AUTO: 0.43 10*3/MM3 (ref 0.1–0.9)
MONOCYTES NFR BLD AUTO: 8.4 % (ref 5–12)
NEUTROPHILS # BLD AUTO: 2.78 10*3/MM3 (ref 1.7–7)
NEUTROPHILS NFR BLD AUTO: 54.5 % (ref 42.7–76)
NOTE: ABNORMAL
NOTE: ABNORMAL
NRBC BLD AUTO-RTO: 0 /100 WBC (ref 0–0.2)
OXYHGB MFR BLDV: 56.6 %
OXYHGB MFR BLDV: 57.5 %
PAW @ PEAK INSP FLOW SETTING VENT: 0 CMH2O
PAW @ PEAK INSP FLOW SETTING VENT: 0 CMH2O
PCO2 BLDV: 51.5 MM HG (ref 41–51)
PCO2 BLDV: 52.6 MM HG (ref 41–51)
PH BLDV: 7.4 PH UNITS
PH BLDV: 7.41 PH UNITS
PLATELET # BLD AUTO: 218 10*3/MM3 (ref 140–450)
PMV BLD AUTO: 9.6 FL (ref 6–12)
PO2 BLDV: 31 MM HG (ref 27–53)
PO2 BLDV: 31.8 MM HG (ref 27–53)
POTASSIUM BLD-SCNC: 3.6 MMOL/L (ref 3.5–5.2)
RBC # BLD AUTO: 4.6 10*6/MM3 (ref 3.77–5.28)
SODIUM BLD-SCNC: 142 MMOL/L (ref 136–145)
TOTAL RATE: 0 BREATHS/MINUTE
TOTAL RATE: 0 BREATHS/MINUTE
TRIGL SERPL-MCNC: 73 MG/DL (ref 0–150)
VLDLC SERPL-MCNC: 14.6 MG/DL
WBC NRBC COR # BLD: 5.1 10*3/MM3 (ref 3.4–10.8)

## 2020-01-30 PROCEDURE — 82805 BLOOD GASES W/O2 SATURATION: CPT

## 2020-01-30 PROCEDURE — 25010000002 NITRIC OXIDE GAS: Performed by: INTERNAL MEDICINE

## 2020-01-30 PROCEDURE — 93451 RIGHT HEART CATH: CPT | Performed by: INTERNAL MEDICINE

## 2020-01-30 PROCEDURE — C1894 INTRO/SHEATH, NON-LASER: HCPCS | Performed by: INTERNAL MEDICINE

## 2020-01-30 PROCEDURE — 93463 DRUG ADMIN & HEMODYNMIC MEAS: CPT | Performed by: INTERNAL MEDICINE

## 2020-01-30 PROCEDURE — 25010000003 LIDOCAINE 1 % SOLUTION: Performed by: INTERNAL MEDICINE

## 2020-01-30 PROCEDURE — 80061 LIPID PANEL: CPT | Performed by: PHYSICIAN ASSISTANT

## 2020-01-30 PROCEDURE — 82820 HEMOGLOBIN-OXYGEN AFFINITY: CPT

## 2020-01-30 PROCEDURE — 85025 COMPLETE CBC W/AUTO DIFF WBC: CPT

## 2020-01-30 PROCEDURE — 80048 BASIC METABOLIC PNL TOTAL CA: CPT | Performed by: INTERNAL MEDICINE

## 2020-01-30 PROCEDURE — C1751 CATH, INF, PER/CENT/MIDLINE: HCPCS | Performed by: INTERNAL MEDICINE

## 2020-01-30 PROCEDURE — 25010000002 MIDAZOLAM PER 1 MG: Performed by: INTERNAL MEDICINE

## 2020-01-30 PROCEDURE — 36415 COLL VENOUS BLD VENIPUNCTURE: CPT

## 2020-01-30 RX ORDER — LIDOCAINE HYDROCHLORIDE 10 MG/ML
INJECTION, SOLUTION INFILTRATION; PERINEURAL AS NEEDED
Status: DISCONTINUED | OUTPATIENT
Start: 2020-01-30 | End: 2020-01-30 | Stop reason: HOSPADM

## 2020-01-30 RX ORDER — MIDAZOLAM HYDROCHLORIDE 1 MG/ML
INJECTION INTRAMUSCULAR; INTRAVENOUS AS NEEDED
Status: DISCONTINUED | OUTPATIENT
Start: 2020-01-30 | End: 2020-01-30 | Stop reason: HOSPADM

## 2020-01-30 RX ORDER — NITROGLYCERIN 0.4 MG/1
0.4 TABLET SUBLINGUAL
Status: DISCONTINUED | OUTPATIENT
Start: 2020-01-30 | End: 2020-01-30 | Stop reason: HOSPADM

## 2020-01-30 RX ORDER — NITRIC ACID 65% TO 70%
1 LIQUID (ML) MISCELLANEOUS ONCE
Status: COMPLETED | OUTPATIENT
Start: 2020-01-30 | End: 2020-01-30

## 2020-01-30 RX ORDER — SODIUM CHLORIDE 0.9 % (FLUSH) 0.9 %
3 SYRINGE (ML) INJECTION EVERY 12 HOURS SCHEDULED
Status: DISCONTINUED | OUTPATIENT
Start: 2020-01-30 | End: 2020-01-30 | Stop reason: HOSPADM

## 2020-01-30 RX ORDER — SODIUM CHLORIDE 0.9 % (FLUSH) 0.9 %
10 SYRINGE (ML) INJECTION AS NEEDED
Status: DISCONTINUED | OUTPATIENT
Start: 2020-01-30 | End: 2020-01-30 | Stop reason: HOSPADM

## 2020-01-30 RX ORDER — ONDANSETRON 2 MG/ML
4 INJECTION INTRAMUSCULAR; INTRAVENOUS EVERY 8 HOURS PRN
Status: DISCONTINUED | OUTPATIENT
Start: 2020-01-30 | End: 2020-01-30 | Stop reason: HOSPADM

## 2020-01-30 RX ORDER — ASPIRIN 325 MG
325 TABLET, DELAYED RELEASE (ENTERIC COATED) ORAL DAILY
Status: DISCONTINUED | OUTPATIENT
Start: 2020-01-31 | End: 2020-01-30 | Stop reason: HOSPADM

## 2020-01-30 RX ORDER — ASPIRIN 325 MG
325 TABLET ORAL ONCE
Status: COMPLETED | OUTPATIENT
Start: 2020-01-30 | End: 2020-01-30

## 2020-01-30 RX ORDER — SILDENAFIL CITRATE 20 MG/1
20 TABLET ORAL 3 TIMES DAILY
Qty: 90 TABLET | Refills: 6 | Status: SHIPPED | OUTPATIENT
Start: 2020-01-30 | End: 2020-08-25

## 2020-01-30 RX ORDER — PAROXETINE 10 MG/1
10 TABLET, FILM COATED ORAL NIGHTLY
COMMUNITY
End: 2021-01-01 | Stop reason: SDUPTHER

## 2020-01-30 RX ORDER — SODIUM CHLORIDE 9 MG/ML
3 INJECTION, SOLUTION INTRAVENOUS CONTINUOUS
Status: ACTIVE | OUTPATIENT
Start: 2020-01-30 | End: 2020-01-30

## 2020-01-30 RX ADMIN — ASPIRIN 325 MG ORAL TABLET 325 MG: 325 PILL ORAL at 07:59

## 2020-01-30 RX ADMIN — SODIUM CHLORIDE 3 ML/KG/HR: 9 INJECTION, SOLUTION INTRAVENOUS at 07:59

## 2020-01-30 RX ADMIN — Medication 1 EACH: at 11:36

## 2020-02-07 RX ORDER — METOPROLOL TARTRATE 100 MG/1
TABLET ORAL
Qty: 180 TABLET | Refills: 3 | Status: SHIPPED | OUTPATIENT
Start: 2020-02-07 | End: 2020-02-07 | Stop reason: DRUGHIGH

## 2020-02-07 RX ORDER — METOPROLOL TARTRATE 100 MG/1
TABLET ORAL
Qty: 270 TABLET | Refills: 3 | Status: SHIPPED | OUTPATIENT
Start: 2020-02-07 | End: 2021-02-19 | Stop reason: SDUPTHER

## 2020-02-24 ENCOUNTER — TELEPHONE (OUTPATIENT)
Dept: CARDIOLOGY | Facility: CLINIC | Age: 80
End: 2020-02-24

## 2020-03-10 ENCOUNTER — CLINICAL SUPPORT NO REQUIREMENTS (OUTPATIENT)
Dept: CARDIOLOGY | Facility: CLINIC | Age: 80
End: 2020-03-10

## 2020-03-10 DIAGNOSIS — I48.0 PAROXYSMAL ATRIAL FIBRILLATION (HCC): ICD-10-CM

## 2020-03-10 DIAGNOSIS — I49.5 TACHYCARDIA-BRADYCARDIA SYNDROME (HCC): ICD-10-CM

## 2020-03-10 PROCEDURE — 93294 REM INTERROG EVL PM/LDLS PM: CPT | Performed by: INTERNAL MEDICINE

## 2020-03-10 PROCEDURE — 93296 REM INTERROG EVL PM/IDS: CPT | Performed by: INTERNAL MEDICINE

## 2020-04-09 NOTE — PROGRESS NOTES
"[facility] Cardiology    Encounter Date:04/10/2020    Patient ID: Kenisha Ruvalcaba is a 79 y.o. female.  : 1940     PCP: Lana Flores MD       Chief Complaint: Coronary Artery Disease      PROBLEM LIST:  1. Pulmonary hypertension  a. Echocardiogram 10/2016: EF >70%, grade II diastolic dysfunction, LA cavity mildly dilated, mild MR, RVSP 45-55 mmHg  b. Echo 2019: LVEF normal, fibrocalcific AV, \"mild\" AS/AI, severe TR with RVSP 95mmHg   c. RHC. 2020: Severe mixed pulmonary hypertension. Mild response to inhaled 100% oxygen and significant response to nitrous oxide.  2. Paroxysmal atrial fibrillation:  a. S/p successful RFA of the pulmonary veins, atrial tachycardia, right atrial isthmus dependent flutter, and RFA of focal AF and long highly fractionated LA signals, 19  3. Sick sinus syndrome:   a. PM implant 17- BTK   4. Mild coronary artery disease on cardiac catheterization, 2013 and LHC on 16, normal EF  a. Stress MPS 2019: no evidence of ischemia, normal LVEF  5. Carotid artery stenosis, status post left CEA  a. Carotid ultrasound, 2013, with no significant stenosis.  6. Valvular heart disease:  a. Echo 19: Fibrocalcific changes are noted in the aortic valve. There is \"mild\" AS/AI. Severe TR.  7. Multiple falls  8. Hypertension.   9. Dyslipidemia.   10. Vertigo.   11. Anxiety.   12. Surgical history:  a. Tubal ligation.   b. Left CEA, 2008.  c. Left Hip replacement      History of Present Illness  Patient has a telephone visit today for a 2 month hospital follow-up s/p RHC. She has a hx of pulmonary hypertension, paroxysmal atrial fibrillation, sick sinus syndrome and cardiac risk factors. Since last visit she has remained stable and feels well.  She is trying to stay indoors due to her high risk of virus infection.  She has no current complaints of chest pain shortness of breath edema palpitations dizziness " lightheadedness or syncope.  She checks her temperature daily and this has been 99 or less.  Tolerating all medications without problems.  States that her pulse typically runs high around 90 but she has no awareness of it unless she checks it.  She checked her vital signs today and checks them frequently and blood pressure has been lower than reported.    Allergies   Allergen Reactions   • Beta Adrenergic Blockers Dizziness     DIZZINESS    • Sulfa Antibiotics Rash     RASH          Current Outpatient Medications:   •  acetaminophen (TYLENOL) 325 MG tablet, Take 2 tablets by mouth Every 4 (Four) Hours As Needed for Mild Pain ., Disp: , Rfl:   •  aspirin 81 MG EC tablet, Take 81 mg by mouth Every Night., Disp: , Rfl:   •  cholecalciferol (VITAMIN D3) 1000 UNITS tablet, Take 1,000 Units by mouth Daily., Disp: , Rfl:   •  coenzyme Q10 100 MG capsule, Take 100 mg by mouth Daily., Disp: , Rfl:   •  ELIQUIS 5 MG tablet tablet, TAKE 1 BY MOUTH TWICE A DAY, Disp: 60 tablet, Rfl: 6  •  furosemide (LASIX) 20 MG tablet, Take 1 tablet by mouth Daily., Disp: 90 tablet, Rfl: 3  •  levothyroxine (SYNTHROID, LEVOTHROID) 25 MCG tablet, Take 50 mcg by mouth Every Morning., Disp: , Rfl:   •  losartan (COZAAR) 100 MG tablet, TAKE 1 TABLET BY MOUTH EVERY DAY, Disp: 90 tablet, Rfl: 3  •  meclizine (ANTIVERT) 25 MG tablet, Take 25 mg by mouth 3 (Three) Times a Day As Needed for dizziness., Disp: , Rfl:   •  metoprolol tartrate (LOPRESSOR) 100 MG tablet, Take 1.5 tabs PO BID, Disp: 270 tablet, Rfl: 3  •  nitroglycerin (NITROSTAT) 0.4 MG SL tablet, Place 0.4 mg under the tongue Every 5 (Five) Minutes As Needed for chest pain. Take no more than 3 doses in 15 minutes., Disp: , Rfl:   •  PARoxetine (PAXIL) 10 MG tablet, Take 10 mg by mouth Every Night., Disp: , Rfl:   •  potassium chloride (K-DUR) 10 MEQ CR tablet, Take 1 tablet by mouth Daily. Take with lasix, Disp: 30 tablet, Rfl: 11  •  rosuvastatin (CRESTOR) 20 MG tablet, Take 20 mg by  "mouth 3 (Three) Times a Week. MON, WEDS, FRI, Disp: , Rfl:   •  sildenafil (REVATIO) 20 MG tablet, Take 1 tablet by mouth 3 (Three) Times a Day., Disp: 90 tablet, Rfl: 6  •  traZODone (DESYREL) 50 MG tablet, TAKE 1-2 TABLETS AT BEDTIME AS NEEDED INSOMNIA, Disp: , Rfl: 5    The following portions of the patient's history were reviewed and updated as appropriate: allergies, current medications, past family history, past medical history, past social history, past surgical history and problem list.    ROS  Review of Systems   Constitution: Negative for chills, fever, fatigue, generalized weakness.   Cardiovascular: Negative for chest pain, claudication, dyspnea on exertion, leg swelling, palpitations, orthopnea, and syncope.   Respiratory: Negative for cough, shortness of breath, and wheezing.  HENT: Negative for ear pain, nosebleeds, and tinnitus.  Gastrointestinal: Negative for abdominal pain, constipation, diarrhea, nausea and vomiting.   Genitourinary: No urinary symptoms.  Musculoskeletal: Negative for muscle cramps.  Neurological: Negative for dizziness, headaches, loss of balance, numbness, and symptoms of stroke.  Psychiatric: Normal mental status.     All other systems reviewed and are negative.    Objective:     /74 (BP Location: Right arm, Patient Position: Sitting)   Pulse 97   Ht 161.3 cm (63.5\")   Wt 63.5 kg (140 lb)   LMP  (LMP Unknown)   BMI 24.41 kg/m²        Physical Exam  No physical exam performed secondary to telephone visit.  Reported vital signs reviewed.     Vitals reviewed.    Lab Review:   Lab Results   Component Value Date    GLUCOSE 110 (H) 01/30/2020    BUN 18 01/30/2020    CREATININE 0.85 01/30/2020    EGFRIFNONA 64 01/30/2020    BCR 21.2 01/30/2020    K 3.6 01/30/2020    CO2 28.0 01/30/2020    CALCIUM 9.7 01/30/2020    ALBUMIN 4.65 01/30/2019    AST 31 01/30/2019    ALT 30 01/30/2019     Lab Results   Component Value Date    CHOL 108 01/30/2020    CHLPL 194 04/28/2016    TRIG 73 " 01/30/2020    HDL 40 01/30/2020    LDL 53 01/30/2020      Lab Results   Component Value Date    WBC 5.10 01/30/2020    RBC 4.60 01/30/2020    HGB 11.1 (L) 01/30/2020    HCT 37.2 01/30/2020    MCV 80.9 01/30/2020     01/30/2020     Lab Results   Component Value Date    TSH 4.980 (H) 04/07/2019     Lab Results   Component Value Date    HGBA1C 5.80 (H) 04/07/2019        Procedures       Assessment:      Diagnosis Plan   1. Pulmonary HTN (CMS/HCC)   stable, continue current medications which she is tolerating well.   2. Paroxysmal atrial fibrillation (CMS/HCC)   status post previous ablation, continue Eliquis.   3. Coronary artery disease involving native coronary artery of native heart with angina pectoris (CMS/HCC)   mild, no angina or CHF.   4. Heart valve disease   stable, no heart failure symptoms.   5. Essential hypertension   fair control, continue current medications.   6. Tachycardia-bradycardia syndrome (CMS/HCC)   status post pacemaker implant, stable.  Remote device check last month was within normal limits.     Plan:   Stable cardiac status.  Continue current medications.   FU in 3 MO, sooner as needed.  Thank you for allowing us to participate in the care of your patient.     This patient has consented to a telehealth visit via telephone. The visit was scheduled as a telephone visit to comply with patient safety concerns in accordance with CDC recommendations.  All vitals recorded within this visit are reported by the patient.  I spent 20 minutes in total including but not limited to the 10 minutes spent in direct conversation with this patient.       Sang Sosa MD, FAC, [facility]    Please note that portions of this note may have been completed with a voice recognition program. Efforts were made to edit the dictations, but occasionally words are mistranscribed.

## 2020-04-10 ENCOUNTER — OFFICE VISIT (OUTPATIENT)
Dept: CARDIOLOGY | Facility: CLINIC | Age: 80
End: 2020-04-10

## 2020-04-10 VITALS
WEIGHT: 140 LBS | BODY MASS INDEX: 23.9 KG/M2 | SYSTOLIC BLOOD PRESSURE: 139 MMHG | DIASTOLIC BLOOD PRESSURE: 74 MMHG | HEART RATE: 97 BPM | HEIGHT: 64 IN

## 2020-04-10 DIAGNOSIS — I49.5 TACHYCARDIA-BRADYCARDIA SYNDROME (HCC): ICD-10-CM

## 2020-04-10 DIAGNOSIS — I48.0 PAROXYSMAL ATRIAL FIBRILLATION (HCC): ICD-10-CM

## 2020-04-10 DIAGNOSIS — I27.20 PULMONARY HTN (HCC): Primary | ICD-10-CM

## 2020-04-10 DIAGNOSIS — I38 HEART VALVE DISEASE: ICD-10-CM

## 2020-04-10 DIAGNOSIS — I25.119 CORONARY ARTERY DISEASE INVOLVING NATIVE CORONARY ARTERY OF NATIVE HEART WITH ANGINA PECTORIS (HCC): ICD-10-CM

## 2020-04-10 DIAGNOSIS — I10 ESSENTIAL HYPERTENSION: ICD-10-CM

## 2020-04-10 PROCEDURE — 99442 PR PHYS/QHP TELEPHONE EVALUATION 11-20 MIN: CPT | Performed by: INTERNAL MEDICINE

## 2020-04-15 RX ORDER — APIXABAN 5 MG/1
TABLET, FILM COATED ORAL
Qty: 60 TABLET | Refills: 11 | Status: SHIPPED | OUTPATIENT
Start: 2020-04-15 | End: 2021-04-22

## 2020-04-15 NOTE — TELEPHONE ENCOUNTER
Spoke with pt related upcoming watchman implant and needs prior to scheduling.  She states she has a tele med visit with PCP in may and will discuss SDM tool.  She also mentioned labile INRs with warfarin in the past.  Will discuss pre procedure anticoagulation with Dr Swenson closer to implant date.    Patient advised due to COVID 19 all elective procedures are on hold.  Will continue to monitor the situation and plan to follow up with patient around the first of June for an update. Pt encouraged to call in the interim with any questions or concerns.  Pt verbalized understanding.    Dolly Castillo RN

## 2020-05-21 RX ORDER — POTASSIUM CHLORIDE 750 MG/1
10 TABLET, FILM COATED, EXTENDED RELEASE ORAL DAILY
Qty: 90 TABLET | Refills: 3 | Status: SHIPPED | OUTPATIENT
Start: 2020-05-21 | End: 2021-06-07

## 2020-05-21 RX ORDER — FUROSEMIDE 20 MG/1
TABLET ORAL
Qty: 90 TABLET | Refills: 3 | Status: SHIPPED | OUTPATIENT
Start: 2020-05-21 | End: 2020-12-18

## 2020-06-03 ENCOUNTER — HOSPITAL ENCOUNTER (EMERGENCY)
Facility: HOSPITAL | Age: 80
Discharge: HOME | End: 2020-06-03
Attending: EMERGENCY MEDICINE | Admitting: EMERGENCY MEDICINE

## 2020-06-03 ENCOUNTER — APPOINTMENT (OUTPATIENT)
Dept: CT IMAGING | Facility: HOSPITAL | Age: 80
End: 2020-06-03

## 2020-06-03 ENCOUNTER — APPOINTMENT (OUTPATIENT)
Dept: GENERAL RADIOLOGY | Facility: HOSPITAL | Age: 80
End: 2020-06-03

## 2020-06-03 VITALS
DIASTOLIC BLOOD PRESSURE: 82 MMHG | TEMPERATURE: 98.2 F | HEIGHT: 63 IN | SYSTOLIC BLOOD PRESSURE: 154 MMHG | BODY MASS INDEX: 24.8 KG/M2 | OXYGEN SATURATION: 94 % | WEIGHT: 140 LBS | HEART RATE: 102 BPM | RESPIRATION RATE: 16 BRPM

## 2020-06-03 DIAGNOSIS — R07.89 CHEST WALL PAIN: ICD-10-CM

## 2020-06-03 DIAGNOSIS — S01.81XA FACIAL LACERATION, INITIAL ENCOUNTER: ICD-10-CM

## 2020-06-03 DIAGNOSIS — S80.01XA CONTUSION OF RIGHT KNEE, INITIAL ENCOUNTER: ICD-10-CM

## 2020-06-03 DIAGNOSIS — S09.90XA CLOSED HEAD INJURY, INITIAL ENCOUNTER: Primary | ICD-10-CM

## 2020-06-03 PROCEDURE — 73560 X-RAY EXAM OF KNEE 1 OR 2: CPT

## 2020-06-03 PROCEDURE — 90471 IMMUNIZATION ADMIN: CPT | Performed by: EMERGENCY MEDICINE

## 2020-06-03 PROCEDURE — 25010000002 TDAP 5-2.5-18.5 LF-MCG/0.5 SUSPENSION: Performed by: EMERGENCY MEDICINE

## 2020-06-03 PROCEDURE — 99284 EMERGENCY DEPT VISIT MOD MDM: CPT

## 2020-06-03 PROCEDURE — 90715 TDAP VACCINE 7 YRS/> IM: CPT | Performed by: EMERGENCY MEDICINE

## 2020-06-03 PROCEDURE — 70450 CT HEAD/BRAIN W/O DYE: CPT

## 2020-06-03 PROCEDURE — 71101 X-RAY EXAM UNILAT RIBS/CHEST: CPT

## 2020-06-03 RX ORDER — LIDOCAINE HYDROCHLORIDE 10 MG/ML
10 INJECTION, SOLUTION EPIDURAL; INFILTRATION; INTRACAUDAL; PERINEURAL ONCE
Status: COMPLETED | OUTPATIENT
Start: 2020-06-03 | End: 2020-06-03

## 2020-06-03 RX ORDER — ACETAMINOPHEN 500 MG
1000 TABLET ORAL ONCE
Status: COMPLETED | OUTPATIENT
Start: 2020-06-03 | End: 2020-06-03

## 2020-06-03 RX ADMIN — TETANUS TOXOID, REDUCED DIPHTHERIA TOXOID AND ACELLULAR PERTUSSIS VACCINE, ADSORBED 0.5 ML: 5; 2.5; 8; 8; 2.5 SUSPENSION INTRAMUSCULAR at 17:27

## 2020-06-03 RX ADMIN — ACETAMINOPHEN 1000 MG: 500 TABLET, FILM COATED ORAL at 17:26

## 2020-06-03 RX ADMIN — Medication 3 ML: at 16:05

## 2020-06-03 RX ADMIN — LIDOCAINE HYDROCHLORIDE 10 ML: 10 INJECTION, SOLUTION EPIDURAL; INFILTRATION; INTRACAUDAL; PERINEURAL at 17:22

## 2020-06-03 NOTE — ED PROVIDER NOTES
Subjective   Pt complains of injuries from a fall just PTA.  Her house shoe caught on the floor and she fell, striking head on the floor.  No LOC.  She has a headache.  No numbness, tingling, weakness or nausea.  No neck pain.  She also complains of pain to her left anterior chest worse with torso ROM, and pain to her R knee.  She was ambulatory after the fall.          Review of Systems   Cardiovascular: Negative for chest pain.   Gastrointestinal: Negative for abdominal pain and vomiting.   Musculoskeletal: Negative for back pain and neck pain.   Skin: Positive for wound.   Neurological: Positive for headaches. Negative for dizziness, weakness and numbness.   All other systems reviewed and are negative.      Past Medical History:   Diagnosis Date   • Anesthesia complication     HARD TO WAKE, Hallucinations, Agitation    • Anxiety    • Broken arm 08/30/2019    left upper arm   • Broken nose 08/30/2019   • Chronic hip pain, left 1/7/2019   • Dyslipidemia    • Fall 08/30/2019   • History of cardioversion 12/03/2010    initiation of Tambocor and subsequent external cardioversion.   • ÁNGELA (obstructive sleep apnea)    • Paroxysmal atrial fibrillation (CMS/HCC)    • Vertigo    • Wears glasses        Allergies   Allergen Reactions   • Beta Adrenergic Blockers Dizziness     DIZZINESS    • Sulfa Antibiotics Rash     RASH        Past Surgical History:   Procedure Laterality Date   • BREAST BIOPSY Left     MARKER IN PLACE   • CARDIAC CATHETERIZATION Left 6/16/2016    Procedure: Cardiac catheterization;  Surgeon: Chuck Clark MD;  Location:  [facility] CATH INVASIVE LOCATION;  Service:    • CARDIAC CATHETERIZATION N/A 1/30/2020    Procedure: Right Heart Cath;  Surgeon: Sang Sosa MD;  Location:  [facility] CATH INVASIVE LOCATION;  Service: Cardiology   • CARDIAC ELECTROPHYSIOLOGY PROCEDURE N/A 2/17/2017    Procedure: Pacemaker DC new;  Surgeon: Darryl Swenson MD;  Location:  [facility] EP INVASIVE LOCATION;  Service:    •  CARDIAC ELECTROPHYSIOLOGY PROCEDURE N/A 4/8/2019    Procedure: Ablation atrial fibrillation, hold Tikosyn for 5 days stay on metoprolol.;  Surgeon: Darryl Swenson MD;  Location: [facility] EP INVASIVE LOCATION;  Service: Cardiovascular   • CAROTID ENDARTERECTOMY Left    • CATARACT EXTRACTION Bilateral    • COLONOSCOPY      every 5-10 years. does not plan to have anymore   • PACEMAKER IMPLANTATION     • TOTAL HIP ARTHROPLASTY Left 1/30/2019    Procedure: TOTAL HIP ARTHROPLASTY LEFT;  Surgeon: Freddie Vale MD;  Location: [facility] OR;  Service: Orthopedics   • TUBAL ABDOMINAL LIGATION         Family History   Problem Relation Age of Onset   • Alzheimer's disease Mother    • Cancer Mother    • Aortic aneurysm Father    • Heart valve disorder Sister        Social History     Socioeconomic History   • Marital status:      Spouse name: Not on file   • Number of children: Not on file   • Years of education: Not on file   • Highest education level: Not on file   Occupational History   • Occupation: retired   Tobacco Use   • Smoking status: Never Smoker   • Smokeless tobacco: Never Used   Substance and Sexual Activity   • Alcohol use: Yes     Alcohol/week: 1.0 standard drinks     Types: 1 Glasses of wine per week     Frequency: Monthly or less     Comment: occas   • Drug use: No   • Sexual activity: Defer   Social History Narrative    Denies caffeine use. Lives at home with .            Objective   Physical Exam   Constitutional: She is oriented to person, place, and time. She appears well-developed and well-nourished. She is cooperative.  Non-toxic appearance. No distress.   HENT:   Head: Normocephalic.   Mouth/Throat: Oropharynx is clear and moist and mucous membranes are normal.   There is a 3 cm laceration through left eyebrow.   Eyes: Conjunctivae and EOM are normal. No scleral icterus.   Neck: Normal range of motion. Neck supple.   Nontender, normal ROM.  Nexus negative.   Cardiovascular: Normal rate,  regular rhythm and normal heart sounds.   No murmur heard.  Pulmonary/Chest: Effort normal and breath sounds normal. No respiratory distress. She has no wheezes. She has no rhonchi. She has no rales.   Abdominal: Soft. Bowel sounds are normal. There is no tenderness. There is no rebound and no guarding.   Musculoskeletal: Normal range of motion.   The right knee is bruised and swollen in the prepatellar space.  Tender over patella.  No laxity to drawer tests or varus/valgus stress or axial load.  Normal ROM.    Her chest is tender over left anterior ribs without deformity or crepitus.    Except as documented there is no tenderness to gross palpation of the arms or legs, and intact painless ROM to the shoulders, elbows, wrists, hips, knees and ankles.     Neurological: She is alert and oriented to person, place, and time. She has normal strength.   Speech fluent and articulate.  No facial droop.  5/5 strength in arms and legs.  Normal .  Mentation normal.     Skin: Skin is warm and dry. No rash noted.   Psychiatric: She has a normal mood and affect. Her speech is normal and behavior is normal.   Nursing note and vitals reviewed.      Laceration Repair  Date/Time: 6/3/2020 5:53 PM  Performed by: Angelito Quigley MD  Authorized by: Angeltio Quigley MD     Consent:     Consent obtained:  Verbal    Consent given by:  Patient  Laceration details:     Location:  Face    Face location:  L eyebrow    Length (cm):  3  Repair type:     Repair type:  Simple  Pre-procedure details:     Preparation:  Patient was prepped and draped in usual sterile fashion and imaging obtained to evaluate for foreign bodies  Exploration:     Hemostasis achieved with:  LET and direct pressure    Wound exploration: entire depth of wound probed and visualized      Wound extent: no fascia violation noted, no muscle damage noted, no underlying fracture noted and no vascular damage noted      Contaminated: no    Treatment:     Area  cleansed with:  Betadine    Amount of cleaning:  Standard    Irrigation solution:  Sterile saline  Skin repair:     Repair method:  Sutures    Suture size:  5-0    Suture material:  Prolene    Suture technique:  Simple interrupted    Number of sutures:  7  Approximation:     Approximation:  Close  Post-procedure details:     Dressing:  Non-adherent dressing (surgicel)    Patient tolerance of procedure:  Tolerated well, no immediate complications               ED Course         CT negative.  XRs negative.  Wound repaired as above.  Patient stable on serial rechecks.  Discussed findings, concerns, plan of care, expected course, reasons to return and followup.  Provided the opportunity to ask questions. She has a walker at home and is encouraged to use it until her knee feels better.                                    MDM  Number of Diagnoses or Management Options  Chest wall pain:   Closed head injury, initial encounter:   Contusion of right knee, initial encounter:   Facial laceration, initial encounter:      Amount and/or Complexity of Data Reviewed  Tests in the radiology section of CPT®: reviewed and ordered  Independent visualization of images, tracings, or specimens: yes        Final diagnoses:   Closed head injury, initial encounter   Facial laceration, initial encounter   Contusion of right knee, initial encounter   Chest wall pain            Angelito Quigley MD  06/03/20 [id number]

## 2020-06-09 ENCOUNTER — HOSPITAL ENCOUNTER (EMERGENCY)
Facility: HOSPITAL | Age: 80
Discharge: HOME | End: 2020-06-09

## 2020-06-09 VITALS
DIASTOLIC BLOOD PRESSURE: 77 MMHG | WEIGHT: 140 LBS | BODY MASS INDEX: 24.8 KG/M2 | HEART RATE: 86 BPM | SYSTOLIC BLOOD PRESSURE: 154 MMHG | HEIGHT: 63 IN | TEMPERATURE: 97.7 F | RESPIRATION RATE: 16 BRPM | OXYGEN SATURATION: 94 %

## 2020-06-09 PROCEDURE — 99201: CPT

## 2020-07-08 ENCOUNTER — OFFICE VISIT (OUTPATIENT)
Dept: CARDIOLOGY | Facility: CLINIC | Age: 80
End: 2020-07-08

## 2020-07-08 VITALS
HEIGHT: 63 IN | OXYGEN SATURATION: 97 % | TEMPERATURE: 97.8 F | WEIGHT: 150.8 LBS | HEART RATE: 88 BPM | DIASTOLIC BLOOD PRESSURE: 72 MMHG | SYSTOLIC BLOOD PRESSURE: 120 MMHG | BODY MASS INDEX: 26.72 KG/M2

## 2020-07-08 DIAGNOSIS — I49.5 TACHYCARDIA-BRADYCARDIA SYNDROME (HCC): ICD-10-CM

## 2020-07-08 DIAGNOSIS — I10 ESSENTIAL HYPERTENSION: ICD-10-CM

## 2020-07-08 DIAGNOSIS — I48.0 PAROXYSMAL ATRIAL FIBRILLATION (HCC): Primary | ICD-10-CM

## 2020-07-08 PROCEDURE — 93280 PM DEVICE PROGR EVAL DUAL: CPT | Performed by: INTERNAL MEDICINE

## 2020-07-08 PROCEDURE — 99214 OFFICE O/P EST MOD 30 MIN: CPT | Performed by: INTERNAL MEDICINE

## 2020-07-08 NOTE — PROGRESS NOTES
Kenisha Ruvalcaba  1940  [phone]    07/08/2020    [facility] CARDIOLOGY     Lana Flores MD  [address]  [place] [address]    Chief Complaint   Patient presents with   • Atrial Fibrillation     f/u         Problem List:   1. Paroxysmal atrial fibrillation:  a. Echocardiogram, 09/10/2010: Left atrium 4.6. Moderate left atrial enlargement. Ejection fraction 55%. Severe right atrial enlargement and 2+ tricuspid regurgitation with RVSP 37 mmHg.  b. Coumadin initiation, 09/07/2010.  c. Initiation of Tambocor and subsequent external cardioversion, 12/03/2010.  d. Echocardiogram, 12/03/2010: Moderate mitral regurgitation, moderate tricuspid regurgitation, normal left ventricular size and function, negative bubble study.   e. External cardioversion to sinus rhythm, December 2010.  f. CHADS-VASc score equals 4 to 6, on Eliquis  g. Echocardiogram 10/2016: EF >70%, grade II diastolic dysfunction, LA cavity mildly dilated, mild MR, RVSP 45-55 mmHg  h. Recurrent atrial fibrillation despite Flecainide, flecainide discontinued 09/2018  i. Tikosyn initiation 09/2018, Metoprolol increased to 100mg BID 1/19  j.  S/p successful RFA of the pulmonary veins, atrial tachycardia, right atrial isthmus dependent flutter, and RFA of focal AF and long highly fractionated LA signals, 4/8/19  2. Sick sinus syndrome:   a. Patient was hospitalized at [facility], 04/26/2016 through 04/28/2016 with a 30-day Event monitor.   b. Event recorder 12/16/16: 9% AF, 3.3 sec pauses, HR range from 37 bpm - 151 bpm  c. PM implant 2/17/17- BTK   3. Mild coronary artery disease on cardiac catheterization, 02/06/2013. And LHC on 6/6/16, normal EF  4. Carotid artery stenosis, status post left CEA:  a. Carotid ultrasound, March 2013, with no significant stenosis.  5. Valvular heart disease:  a. Echo, 01/19/2013: Left ventricular ejection fraction 55% to 60%, mild to moderate mitral  regurgitation, RVSP 46 mmHg.   6. Hypertension.   7. Dyslipidemia.   8. Vertigo.   9. Anxiety.   10. Surgical history:  a. Tubal ligation.   b. Left CEA, March 2008.     Allergies  Allergies   Allergen Reactions   • Beta Adrenergic Blockers Dizziness     DIZZINESS    • Sulfa Antibiotics Rash     RASH        Current Medications    Current Outpatient Medications:   •  acetaminophen (TYLENOL) 325 MG tablet, Take 2 tablets by mouth Every 4 (Four) Hours As Needed for Mild Pain ., Disp: , Rfl:   •  aspirin 81 MG EC tablet, Take 81 mg by mouth Every Night., Disp: , Rfl:   •  cholecalciferol (VITAMIN D3) 1000 UNITS tablet, Take 1,000 Units by mouth Daily., Disp: , Rfl:   •  coenzyme Q10 100 MG capsule, Take 100 mg by mouth Daily., Disp: , Rfl:   •  ELIQUIS 5 MG tablet tablet, TAKE 1 BY MOUTH TWICE A DAY, Disp: 60 tablet, Rfl: 11  •  furosemide (LASIX) 20 MG tablet, TAKE 1 TABLET BY MOUTH DAILY. TAKE AS NEEDED FOR EDEMA., Disp: 90 tablet, Rfl: 3  •  levothyroxine (SYNTHROID, LEVOTHROID) 25 MCG tablet, Take 50 mcg by mouth Every Morning., Disp: , Rfl:   •  losartan (COZAAR) 100 MG tablet, TAKE 1 TABLET BY MOUTH EVERY DAY, Disp: 90 tablet, Rfl: 3  •  meclizine (ANTIVERT) 25 MG tablet, Take 25 mg by mouth 3 (Three) Times a Day As Needed for dizziness., Disp: , Rfl:   •  metoprolol tartrate (LOPRESSOR) 100 MG tablet, Take 1.5 tabs PO BID, Disp: 270 tablet, Rfl: 3  •  nitroglycerin (NITROSTAT) 0.4 MG SL tablet, Place 0.4 mg under the tongue Every 5 (Five) Minutes As Needed for chest pain. Take no more than 3 doses in 15 minutes., Disp: , Rfl:   •  PARoxetine (PAXIL) 10 MG tablet, Take 10 mg by mouth Every Night., Disp: , Rfl:   •  potassium chloride (K-DUR) 10 MEQ CR tablet, TAKE 1 TABLET BY MOUTH DAILY. TAKE WITH LASIX, Disp: 90 tablet, Rfl: 3  •  rosuvastatin (CRESTOR) 20 MG tablet, Take 20 mg by mouth 3 (Three) Times a Week. MON, WEDS, FRI, Disp: , Rfl:   •  sildenafil (REVATIO) 20 MG tablet, Take 1 tablet by mouth 3 (Three)  "Times a Day., Disp: 90 tablet, Rfl: 6  •  traZODone (DESYREL) 50 MG tablet, TAKE 1-2 TABLETS AT BEDTIME AS NEEDED INSOMNIA, Disp: , Rfl: 5    History of Present Illness     Pt presents for follow up of AF/HTN/SSS. Since we last saw the pt, pt denies any AF episodes, SOB, CP, LH, and dizziness. Denies any hospitalizations, ER visits, bleeding, or TIA/CVA symptoms. Overall feels well. Has had two falls in August (fracturede left arm and nose)and last month requiring stitches over her eye. BP well controlled at home. Also with chronic anemia    ROS:  General:  Denies fatigue, weight gain or loss  Cardiovascular:  Denies CP, PND, syncope, near syncope, edema or palpitations.  Pulmonary:  Denies GARVEY, cough, or wheezing      Vitals:    07/08/20 1554   BP: 120/72   BP Location: Left arm   Patient Position: Sitting   Pulse: 88   Temp: 97.8 °F (36.6 °C)   SpO2: 97%   Weight: 68.4 kg (150 lb 12.8 oz)   Height: 160 cm (63\")     Body mass index is 26.71 kg/m².  PE:  General: NAD  Neck: no JVD, no carotid bruits, no TM  Heart RRR, NL S1, S2, S4 present, no rubs, murmurs  Lungs: CTA, no wheezes, rhonchi, or rales  Abd: soft, non-tender, NL BS  Ext: No musculoskeletal deformities, no edema, cyanosis, or clubbing  Psych: normal mood and affect    Diagnostic Data:    Manual device interrogation: Biotronik PPM, 91% RA paced, 2% RV paced, 7 years battery life, <1% AF, 92% RA paced    Procedures    1. Paroxysmal atrial fibrillation (CMS/HCC)    2. Tachycardia-bradycardia syndrome (CMS/HCC)    3. Essential hypertension          Plan:  1) Paroxysmal atrial fibrillation:  - S/p PVA/AFL/AT/FAF 04/2019.  Overall maintaining NSR off antiarrhythmics. No AF per PM check  - Continue present medications.   2) Anticoagulation: multiple falls/chronic anemia. Good short term oral anticoagulation but poor long term candidate: She would like to pursue AURORA occlusion with Watchman  - CHADSVASc = 5, on Eliquis  3) SSS:  - S/p PPM implant, device with " normal function  4) HTN:  - Well controlled on amlodipine, losartan  - Wt loss, exercise, salt reduction    F/up in 6 months

## 2020-07-09 DIAGNOSIS — I48.0 PAROXYSMAL ATRIAL FIBRILLATION (HCC): Primary | ICD-10-CM

## 2020-07-10 ENCOUNTER — TELEPHONE (OUTPATIENT)
Dept: CARDIOLOGY | Facility: CLINIC | Age: 80
End: 2020-07-10

## 2020-07-10 NOTE — TELEPHONE ENCOUNTER
I spoke with patient for possible AURORA closure date of July 27th. She wishes to delay the implant until possibly August.

## 2020-07-17 ENCOUNTER — OFFICE VISIT (OUTPATIENT)
Dept: CARDIOLOGY | Facility: CLINIC | Age: 80
End: 2020-07-17

## 2020-07-17 VITALS
HEIGHT: 64 IN | OXYGEN SATURATION: 97 % | HEART RATE: 97 BPM | TEMPERATURE: 96.6 F | SYSTOLIC BLOOD PRESSURE: 142 MMHG | BODY MASS INDEX: 25.92 KG/M2 | WEIGHT: 151.8 LBS | DIASTOLIC BLOOD PRESSURE: 78 MMHG

## 2020-07-17 DIAGNOSIS — I27.20 PULMONARY HTN (HCC): Primary | ICD-10-CM

## 2020-07-17 DIAGNOSIS — I48.0 PAROXYSMAL ATRIAL FIBRILLATION (HCC): ICD-10-CM

## 2020-07-17 DIAGNOSIS — I25.119 CORONARY ARTERY DISEASE INVOLVING NATIVE CORONARY ARTERY OF NATIVE HEART WITH ANGINA PECTORIS (HCC): ICD-10-CM

## 2020-07-17 DIAGNOSIS — I10 ESSENTIAL HYPERTENSION: ICD-10-CM

## 2020-07-17 DIAGNOSIS — E78.5 DYSLIPIDEMIA: ICD-10-CM

## 2020-07-17 PROCEDURE — 99214 OFFICE O/P EST MOD 30 MIN: CPT | Performed by: INTERNAL MEDICINE

## 2020-07-17 NOTE — PROGRESS NOTES
"[facility] Cardiology    Encounter Date: 2020    Patient ID: Kenisha Ruvalcaba is a 80 y.o. female.  : 1940     PCP: Lana Flores MD       Chief Complaint: Pulmonary HTN; Coronary Artery Disease; and Atrial Fibrillation      PROBLEM LIST:  1. Pulmonary hypertension  a. Echocardiogram 10/2016: EF >70%, grade II diastolic dysfunction, LA cavity mildly dilated, mild MR, RVSP 45-55 mmHg  b. Echo 2019: LVEF normal, fibrocalcific AV, \"mild\" AS/AI, severe TR with RVSP 95mmHg   c. RHC. 2020: Severe mixed pulmonary hypertension. Mild response to inhaled 100% oxygen and significant response to nitrous oxide.  2. Paroxysmal atrial fibrillation:  a. Echocardiogram, 09/10/2010: Left atrium 4.6. Moderate left atrial enlargement. Ejection fraction 55%. Severe right atrial enlargement and 2+ tricuspid regurgitation with RVSP 37 mmHg.  b. Coumadin initiation, 2010.  c. Initiation of Tambocor and subsequent external cardioversion, 2010.  d. Echocardiogram, 2010: Moderate mitral regurgitation, moderate tricuspid regurgitation, normal left ventricular size and function, negative bubble study.   e. External cardioversion to sinus rhythm, 2010.  f. CHADS-VASc score equals 4 to 6, on Eliquis  g. Echocardiogram 10/2016: EF >70%, grade II diastolic dysfunction, LA cavity mildly dilated, mild MR, RVSP 45-55 mmHg  h. Recurrent atrial fibrillation despite Flecainide, flecainide discontinued 2018  i. Tikosyn initiation 2018, Metoprolol increased to 100mg BID   j. S/p successful RFA of the pulmonary veins, atrial tachycardia, right atrial isthmus dependent flutter, and RFA of focal AF and long highly fractionated LA signals, 19  3. Sick sinus syndrome:   a. Event recorder 16: 9% AF, 3.3 sec pauses, HR range from 37 bpm - 151 bpm  b. PM implant 17- BTK  4. Mild coronary artery disease on cardiac catheterization, 2013 and C on " "6/6/16, normal EF  a. Stress MPS 11/22/2019: no evidence of ischemia, normal LVEF  5. Carotid artery stenosis, status post left CEA  a. Carotid ultrasound, March 2013, with no significant stenosis.  6. Valvular heart disease:  a. Echo 11/13/19: Fibrocalcific changes are noted in the aortic valve. There is \"mild\" AS/AI. Severe TR.  7. Multiple falls  8. Hypertension.   9. Dyslipidemia.   10. Vertigo.   11. Anxiety.   12. Surgical history:  a. Tubal ligation.   b. Left CEA, March 2008.  c. Left Hip replacement     History of Present Illness  Patient presents today for a follow-up with a history of PAF, pacemaker, and cardiac risk factors. Since last visit, she is done very well.  She states she \"feels great\".  She has no complaint of exertional chest pain or dyspnea and orthopnea no PND no claudication no lower extremity edema.  She has no awareness of tachyarrhythmias, no dizziness or syncope.  Her blood pressure at home runs between 125 to 140 mmHg systolic.  She had a recent lipid panel through primary care with an HDL of 55 and an LDL of 77.  She is compliant with current medical regimen reports no significant side effects.    Allergies   Allergen Reactions   • Beta Adrenergic Blockers Dizziness     DIZZINESS    • Sulfa Antibiotics Rash     RASH          Current Outpatient Medications:   •  acetaminophen (TYLENOL) 325 MG tablet, Take 2 tablets by mouth Every 4 (Four) Hours As Needed for Mild Pain ., Disp: , Rfl:   •  aspirin 81 MG EC tablet, Take 81 mg by mouth Every Night., Disp: , Rfl:   •  cholecalciferol (VITAMIN D3) 1000 UNITS tablet, Take 1,000 Units by mouth Daily., Disp: , Rfl:   •  coenzyme Q10 100 MG capsule, Take 100 mg by mouth Daily., Disp: , Rfl:   •  ELIQUIS 5 MG tablet tablet, TAKE 1 BY MOUTH TWICE A DAY, Disp: 60 tablet, Rfl: 11  •  furosemide (LASIX) 20 MG tablet, TAKE 1 TABLET BY MOUTH DAILY. TAKE AS NEEDED FOR EDEMA., Disp: 90 tablet, Rfl: 3  •  levothyroxine (SYNTHROID, LEVOTHROID) 25 MCG " tablet, Take 50 mcg by mouth Every Morning., Disp: , Rfl:   •  losartan (COZAAR) 100 MG tablet, TAKE 1 TABLET BY MOUTH EVERY DAY, Disp: 90 tablet, Rfl: 3  •  meclizine (ANTIVERT) 25 MG tablet, Take 25 mg by mouth 3 (Three) Times a Day As Needed for dizziness., Disp: , Rfl:   •  metoprolol tartrate (LOPRESSOR) 100 MG tablet, Take 1.5 tabs PO BID, Disp: 270 tablet, Rfl: 3  •  nitroglycerin (NITROSTAT) 0.4 MG SL tablet, Place 0.4 mg under the tongue Every 5 (Five) Minutes As Needed for chest pain. Take no more than 3 doses in 15 minutes., Disp: , Rfl:   •  PARoxetine (PAXIL) 10 MG tablet, Take 10 mg by mouth Every Night., Disp: , Rfl:   •  potassium chloride (K-DUR) 10 MEQ CR tablet, TAKE 1 TABLET BY MOUTH DAILY. TAKE WITH LASIX, Disp: 90 tablet, Rfl: 3  •  rosuvastatin (CRESTOR) 20 MG tablet, Take 20 mg by mouth 3 (Three) Times a Week. MON, WEDS, FRI, Disp: , Rfl:   •  sildenafil (REVATIO) 20 MG tablet, Take 1 tablet by mouth 3 (Three) Times a Day., Disp: 90 tablet, Rfl: 6  •  traZODone (DESYREL) 50 MG tablet, TAKE 1-2 TABLETS AT BEDTIME AS NEEDED INSOMNIA, Disp: , Rfl: 5    The following portions of the patient's history were reviewed and updated as appropriate: allergies, current medications, past family history, past medical history, past social history, past surgical history and problem list.    ROS  Review of Systems   Constitution: Negative for chills, fever, fatigue, generalized weakness.   Cardiovascular: Negative for chest pain, dyspnea on exertion, leg swelling, palpitations, orthopnea, and syncope.   Respiratory: Negative for cough, shortness of breath, and wheezing.  HENT: Negative for ear pain, nosebleeds, and tinnitus.  Gastrointestinal: Negative for abdominal pain, constipation, diarrhea, nausea and vomiting.   Genitourinary: No urinary symptoms.  Musculoskeletal: Negative for muscle cramps.  Neurological: Negative for dizziness, headaches, loss of balance, numbness, and symptoms of  "stroke.  Psychiatric: Normal mental status.     All other systems reviewed and are negative.        Objective:     /78 (BP Location: Left arm, Patient Position: Sitting)   Pulse 97   Temp 96.6 °F (35.9 °C)   Ht 161.3 cm (63.5\")   Wt 68.9 kg (151 lb 12.8 oz)   LMP  (LMP Unknown)   SpO2 97%   BMI 26.47 kg/m²      Physical Exam  Constitutional: Patient appears well-developed and well-nourished.   HENT: HEENT exam unremarkable.   Neck: Neck supple. No JVD present. No carotid bruits.   Cardiovascular: Normal rate, regular rhythm and normal heart sounds. No murmur heard.   2+ symmetric pulses.   Pulmonary/Chest: Breath sounds normal. Does not exhibit tenderness.   Abdominal: Abdomen benign.   Musculoskeletal: Does not exhibit edema.   Neurological: Neurological exam unremarkable.   Vitals reviewed.    Data Review:   Lab Results   Component Value Date    GLUCOSE 110 (H) 01/30/2020    BUN 18 01/30/2020    CREATININE 0.85 01/30/2020    EGFRIFNONA 64 01/30/2020    BCR 21.2 01/30/2020    K 3.6 01/30/2020    CO2 28.0 01/30/2020    CALCIUM 9.7 01/30/2020    ALBUMIN 4.65 01/30/2019    AST 31 01/30/2019    ALT 30 01/30/2019     Lab Results   Component Value Date    CHOL 108 01/30/2020    TRIG 73 01/30/2020    HDL 40 01/30/2020    LDL 53 01/30/2020      Lab Results   Component Value Date    WBC 5.10 01/30/2020    RBC 4.60 01/30/2020    HGB 11.1 (L) 01/30/2020    HCT 37.2 01/30/2020    MCV 80.9 01/30/2020     01/30/2020     Lab Results   Component Value Date    TSH 4.980 (H) 04/07/2019     Lab Results   Component Value Date    HGBA1C 5.80 (H) 04/07/2019        Procedures       Assessment:      Diagnosis Plan   1. Pulmonary HTN (CMS/HCC)   stable, well managed on current medical regimen   2. Paroxysmal atrial fibrillation (CMS/HCC)   chronically anticoagulated, continue current medical regimen   3. Coronary artery disease involving native coronary artery of native heart with angina pectoris (CMS/HCC)   no current " anginal symptoms, continue current medical regimen   4. Dyslipidemia  Well managed,  continue current therapy.     5. Essential hypertension  Well managed,  continue current therapy.       Plan:   Stable from cardiovascular standpoint.  Continue current medications.   FU in 6 MO, sooner as needed.  Thank you for allowing us to participate in the care of your patient.     Electronically signed by JOSI Ortiz, 07/17/20, 12:41 PM.      Please note that portions of this note may have been completed with a voice recognition program. Efforts were made to edit the dictations, but occasionally words are mistranscribed.

## 2020-08-11 ENCOUNTER — TELEPHONE (OUTPATIENT)
Dept: CARDIOLOGY | Facility: CLINIC | Age: 80
End: 2020-08-11

## 2020-08-11 NOTE — TELEPHONE ENCOUNTER
Attempted to reach pt to discuss [facility] closure.  Left message to return my call    Dolly Castillo RN

## 2020-08-25 RX ORDER — SILDENAFIL CITRATE 20 MG/1
TABLET ORAL
Qty: 90 TABLET | Refills: 0 | Status: SHIPPED | OUTPATIENT
Start: 2020-08-25 | End: 2020-09-25

## 2020-09-25 RX ORDER — SILDENAFIL CITRATE 20 MG/1
TABLET ORAL
Qty: 90 TABLET | Refills: 3 | Status: SHIPPED | OUTPATIENT
Start: 2020-09-25 | End: 2021-01-27

## 2020-12-18 RX ORDER — FUROSEMIDE 20 MG/1
TABLET ORAL
Qty: 90 TABLET | Refills: 3 | Status: SHIPPED | OUTPATIENT
Start: 2020-12-18 | End: 2021-01-01

## 2021-01-01 ENCOUNTER — TRANSITIONAL CARE MANAGEMENT TELEPHONE ENCOUNTER (OUTPATIENT)
Dept: CALL CENTER | Facility: HOSPITAL | Age: 81
End: 2021-01-01

## 2021-01-01 ENCOUNTER — APPOINTMENT (OUTPATIENT)
Dept: GENERAL RADIOLOGY | Facility: HOSPITAL | Age: 81
End: 2021-01-01

## 2021-01-01 ENCOUNTER — LAB (OUTPATIENT)
Dept: LAB | Facility: HOSPITAL | Age: 81
End: 2021-01-01

## 2021-01-01 ENCOUNTER — CLINICAL SUPPORT NO REQUIREMENTS (OUTPATIENT)
Dept: PULMONOLOGY | Facility: CLINIC | Age: 81
End: 2021-01-01

## 2021-01-01 ENCOUNTER — OFFICE VISIT (OUTPATIENT)
Dept: INTERNAL MEDICINE | Facility: CLINIC | Age: 81
End: 2021-01-01

## 2021-01-01 ENCOUNTER — READMISSION MANAGEMENT (OUTPATIENT)
Dept: CALL CENTER | Facility: HOSPITAL | Age: 81
End: 2021-01-01

## 2021-01-01 ENCOUNTER — TELEPHONE (OUTPATIENT)
Dept: INTERNAL MEDICINE | Facility: CLINIC | Age: 81
End: 2021-01-01

## 2021-01-01 ENCOUNTER — DOCUMENTATION (OUTPATIENT)
Dept: INTERNAL MEDICINE | Facility: CLINIC | Age: 81
End: 2021-01-01

## 2021-01-01 ENCOUNTER — HOSPITAL ENCOUNTER (INPATIENT)
Facility: HOSPITAL | Age: 81
LOS: 5 days | Discharge: HOME | End: 2021-12-28
Attending: FAMILY MEDICINE | Admitting: STUDENT IN AN ORGANIZED HEALTH CARE EDUCATION/TRAINING PROGRAM

## 2021-01-01 ENCOUNTER — HOSPITAL ENCOUNTER (OUTPATIENT)
Dept: ONCOLOGY | Facility: HOSPITAL | Age: 81
Setting detail: INFUSION SERIES
Discharge: HOME | End: 2021-12-07

## 2021-01-01 ENCOUNTER — APPOINTMENT (OUTPATIENT)
Dept: ULTRASOUND IMAGING | Facility: HOSPITAL | Age: 81
End: 2021-01-01

## 2021-01-01 ENCOUNTER — OFFICE VISIT (OUTPATIENT)
Dept: PULMONOLOGY | Facility: CLINIC | Age: 81
End: 2021-01-01

## 2021-01-01 ENCOUNTER — INFUSION (OUTPATIENT)
Dept: ONCOLOGY | Facility: HOSPITAL | Age: 81
End: 2021-01-01

## 2021-01-01 ENCOUNTER — APPOINTMENT (OUTPATIENT)
Dept: CARDIOLOGY | Facility: HOSPITAL | Age: 81
End: 2021-01-01

## 2021-01-01 ENCOUNTER — APPOINTMENT (OUTPATIENT)
Dept: ONCOLOGY | Facility: HOSPITAL | Age: 81
End: 2021-01-01

## 2021-01-01 VITALS
BODY MASS INDEX: 26.87 KG/M2 | WEIGHT: 146 LBS | HEIGHT: 62 IN | TEMPERATURE: 97.4 F | RESPIRATION RATE: 18 BRPM | DIASTOLIC BLOOD PRESSURE: 40 MMHG | SYSTOLIC BLOOD PRESSURE: 105 MMHG | HEART RATE: 71 BPM

## 2021-01-01 VITALS
SYSTOLIC BLOOD PRESSURE: 122 MMHG | HEART RATE: 78 BPM | TEMPERATURE: 97.9 F | OXYGEN SATURATION: 91 % | DIASTOLIC BLOOD PRESSURE: 68 MMHG | HEIGHT: 63 IN | BODY MASS INDEX: 25.92 KG/M2 | WEIGHT: 146.3 LBS

## 2021-01-01 VITALS
HEART RATE: 54 BPM | SYSTOLIC BLOOD PRESSURE: 116 MMHG | RESPIRATION RATE: 16 BRPM | TEMPERATURE: 97.5 F | HEIGHT: 62 IN | BODY MASS INDEX: 29.44 KG/M2 | DIASTOLIC BLOOD PRESSURE: 68 MMHG | OXYGEN SATURATION: 87 % | WEIGHT: 160 LBS

## 2021-01-01 VITALS
DIASTOLIC BLOOD PRESSURE: 59 MMHG | WEIGHT: 169.8 LBS | HEIGHT: 63 IN | HEART RATE: 118 BPM | RESPIRATION RATE: 18 BRPM | OXYGEN SATURATION: 98 % | TEMPERATURE: 98.2 F | SYSTOLIC BLOOD PRESSURE: 91 MMHG | BODY MASS INDEX: 30.09 KG/M2

## 2021-01-01 VITALS
TEMPERATURE: 97.1 F | SYSTOLIC BLOOD PRESSURE: 114 MMHG | HEART RATE: 75 BPM | RESPIRATION RATE: 16 BRPM | DIASTOLIC BLOOD PRESSURE: 53 MMHG

## 2021-01-01 VITALS
HEART RATE: 62 BPM | WEIGHT: 160.2 LBS | BODY MASS INDEX: 29.48 KG/M2 | SYSTOLIC BLOOD PRESSURE: 118 MMHG | HEIGHT: 62 IN | DIASTOLIC BLOOD PRESSURE: 62 MMHG | TEMPERATURE: 98.2 F | OXYGEN SATURATION: 95 %

## 2021-01-01 VITALS
SYSTOLIC BLOOD PRESSURE: 116 MMHG | BODY MASS INDEX: 29.04 KG/M2 | WEIGHT: 157.8 LBS | HEART RATE: 83 BPM | OXYGEN SATURATION: 90 % | RESPIRATION RATE: 16 BRPM | DIASTOLIC BLOOD PRESSURE: 54 MMHG | TEMPERATURE: 96.8 F | HEIGHT: 62 IN

## 2021-01-01 DIAGNOSIS — K59.03 DRUG-INDUCED CONSTIPATION: ICD-10-CM

## 2021-01-01 DIAGNOSIS — N17.9 AKI (ACUTE KIDNEY INJURY) (HCC): ICD-10-CM

## 2021-01-01 DIAGNOSIS — D50.0 IRON DEFICIENCY ANEMIA DUE TO CHRONIC BLOOD LOSS: Primary | ICD-10-CM

## 2021-01-01 DIAGNOSIS — I10 ESSENTIAL HYPERTENSION: ICD-10-CM

## 2021-01-01 DIAGNOSIS — Z01.812 BLOOD TESTS PRIOR TO TREATMENT OR PROCEDURE: Primary | ICD-10-CM

## 2021-01-01 DIAGNOSIS — I48.0 PAROXYSMAL ATRIAL FIBRILLATION (HCC): ICD-10-CM

## 2021-01-01 DIAGNOSIS — T45.4X5A ADVERSE EFFECT OF IRON, INITIAL ENCOUNTER: ICD-10-CM

## 2021-01-01 DIAGNOSIS — B37.2 CANDIDAL INTERTRIGO: ICD-10-CM

## 2021-01-01 DIAGNOSIS — Z71.89 ADVANCED CARE PLANNING/COUNSELING DISCUSSION: ICD-10-CM

## 2021-01-01 DIAGNOSIS — Z01.812 BLOOD TESTS PRIOR TO TREATMENT OR PROCEDURE: ICD-10-CM

## 2021-01-01 DIAGNOSIS — T14.8XXA BLISTER: ICD-10-CM

## 2021-01-01 DIAGNOSIS — I27.20 PULMONARY HTN (HCC): Primary | ICD-10-CM

## 2021-01-01 DIAGNOSIS — K90.9 IRON MALABSORPTION: ICD-10-CM

## 2021-01-01 DIAGNOSIS — I27.20 PULMONARY HTN (HCC): ICD-10-CM

## 2021-01-01 DIAGNOSIS — D50.0 IRON DEFICIENCY ANEMIA DUE TO CHRONIC BLOOD LOSS: ICD-10-CM

## 2021-01-01 DIAGNOSIS — K21.9 GASTROESOPHAGEAL REFLUX DISEASE WITHOUT ESOPHAGITIS: ICD-10-CM

## 2021-01-01 DIAGNOSIS — F39 MOOD DISORDER (HCC): ICD-10-CM

## 2021-01-01 DIAGNOSIS — J90 PLEURAL EFFUSION: Primary | ICD-10-CM

## 2021-01-01 LAB
ALBUMIN SERPL-MCNC: 3.5 G/DL (ref 3.5–5.2)
ALBUMIN SERPL-MCNC: 3.5 G/DL (ref 3.5–5.2)
ALP SERPL-CCNC: 150 U/L (ref 39–117)
ALT SERPL W P-5'-P-CCNC: 12 U/L (ref 1–33)
ANION GAP SERPL CALCULATED.3IONS-SCNC: 10 MMOL/L (ref 5–15)
ANION GAP SERPL CALCULATED.3IONS-SCNC: 10 MMOL/L (ref 5–15)
ANION GAP SERPL CALCULATED.3IONS-SCNC: 11 MMOL/L (ref 5–15)
ANION GAP SERPL CALCULATED.3IONS-SCNC: 11.3 MMOL/L (ref 5–15)
ANION GAP SERPL CALCULATED.3IONS-SCNC: 13 MMOL/L (ref 5–15)
ANION GAP SERPL CALCULATED.3IONS-SCNC: 15 MMOL/L (ref 5–15)
ANION GAP SERPL CALCULATED.3IONS-SCNC: 6 MMOL/L (ref 5–15)
ANISOCYTOSIS BLD QL: NORMAL
AST SERPL-CCNC: 19 U/L (ref 1–32)
BACTERIA UR QL AUTO: NORMAL /HPF
BASOPHILS # BLD AUTO: 0.03 10*3/MM3 (ref 0–0.2)
BASOPHILS NFR BLD AUTO: 0.4 % (ref 0–1.5)
BH CV LOWER VASCULAR LEFT COMMON FEMORAL AUGMENT: NORMAL
BH CV LOWER VASCULAR LEFT COMMON FEMORAL COMPETENT: NORMAL
BH CV LOWER VASCULAR LEFT COMMON FEMORAL COMPRESS: NORMAL
BH CV LOWER VASCULAR LEFT COMMON FEMORAL PHASIC: NORMAL
BH CV LOWER VASCULAR LEFT COMMON FEMORAL SPONT: NORMAL
BH CV LOWER VASCULAR LEFT DISTAL FEMORAL AUGMENT: NORMAL
BH CV LOWER VASCULAR LEFT DISTAL FEMORAL COMPETENT: NORMAL
BH CV LOWER VASCULAR LEFT DISTAL FEMORAL COMPRESS: NORMAL
BH CV LOWER VASCULAR LEFT DISTAL FEMORAL PHASIC: NORMAL
BH CV LOWER VASCULAR LEFT DISTAL FEMORAL SPONT: NORMAL
BH CV LOWER VASCULAR LEFT GASTRONEMIUS COMPRESS: NORMAL
BH CV LOWER VASCULAR LEFT GREATER SAPH AK COMPRESS: NORMAL
BH CV LOWER VASCULAR LEFT GREATER SAPH BK COMPRESS: NORMAL
BH CV LOWER VASCULAR LEFT LESSER SAPH COMPRESS: NORMAL
BH CV LOWER VASCULAR LEFT MID FEMORAL AUGMENT: NORMAL
BH CV LOWER VASCULAR LEFT MID FEMORAL COMPETENT: NORMAL
BH CV LOWER VASCULAR LEFT MID FEMORAL COMPRESS: NORMAL
BH CV LOWER VASCULAR LEFT MID FEMORAL PHASIC: NORMAL
BH CV LOWER VASCULAR LEFT MID FEMORAL SPONT: NORMAL
BH CV LOWER VASCULAR LEFT PERONEAL AUGMENT: NORMAL
BH CV LOWER VASCULAR LEFT PERONEAL COMPRESS: NORMAL
BH CV LOWER VASCULAR LEFT POPLITEAL AUGMENT: NORMAL
BH CV LOWER VASCULAR LEFT POPLITEAL COMPETENT: NORMAL
BH CV LOWER VASCULAR LEFT POPLITEAL COMPRESS: NORMAL
BH CV LOWER VASCULAR LEFT POPLITEAL PHASIC: NORMAL
BH CV LOWER VASCULAR LEFT POPLITEAL SPONT: NORMAL
BH CV LOWER VASCULAR LEFT POSTERIOR TIBIAL AUGMENT: NORMAL
BH CV LOWER VASCULAR LEFT POSTERIOR TIBIAL COMPRESS: NORMAL
BH CV LOWER VASCULAR LEFT PROFUNDA FEMORAL AUGMENT: NORMAL
BH CV LOWER VASCULAR LEFT PROFUNDA FEMORAL COMPETENT: NORMAL
BH CV LOWER VASCULAR LEFT PROFUNDA FEMORAL COMPRESS: NORMAL
BH CV LOWER VASCULAR LEFT PROFUNDA FEMORAL PHASIC: NORMAL
BH CV LOWER VASCULAR LEFT PROFUNDA FEMORAL SPONT: NORMAL
BH CV LOWER VASCULAR LEFT PROXIMAL FEMORAL AUGMENT: NORMAL
BH CV LOWER VASCULAR LEFT PROXIMAL FEMORAL COMPETENT: NORMAL
BH CV LOWER VASCULAR LEFT PROXIMAL FEMORAL COMPRESS: NORMAL
BH CV LOWER VASCULAR LEFT PROXIMAL FEMORAL PHASIC: NORMAL
BH CV LOWER VASCULAR LEFT PROXIMAL FEMORAL SPONT: NORMAL
BH CV LOWER VASCULAR LEFT SAPHENOFEMORAL JUNCTION AUGMENT: NORMAL
BH CV LOWER VASCULAR LEFT SAPHENOFEMORAL JUNCTION COMPETENT: NORMAL
BH CV LOWER VASCULAR LEFT SAPHENOFEMORAL JUNCTION COMPRESS: NORMAL
BH CV LOWER VASCULAR LEFT SAPHENOFEMORAL JUNCTION PHASIC: NORMAL
BH CV LOWER VASCULAR LEFT SAPHENOFEMORAL JUNCTION SPONT: NORMAL
BH CV LOWER VASCULAR RIGHT COMMON FEMORAL AUGMENT: NORMAL
BH CV LOWER VASCULAR RIGHT COMMON FEMORAL COMPETENT: NORMAL
BH CV LOWER VASCULAR RIGHT COMMON FEMORAL COMPRESS: NORMAL
BH CV LOWER VASCULAR RIGHT COMMON FEMORAL PHASIC: NORMAL
BH CV LOWER VASCULAR RIGHT COMMON FEMORAL SPONT: NORMAL
BH CV LOWER VASCULAR RIGHT DISTAL FEMORAL AUGMENT: NORMAL
BH CV LOWER VASCULAR RIGHT DISTAL FEMORAL COMPETENT: NORMAL
BH CV LOWER VASCULAR RIGHT DISTAL FEMORAL COMPRESS: NORMAL
BH CV LOWER VASCULAR RIGHT DISTAL FEMORAL PHASIC: NORMAL
BH CV LOWER VASCULAR RIGHT DISTAL FEMORAL SPONT: NORMAL
BH CV LOWER VASCULAR RIGHT GASTRONEMIUS COMPRESS: NORMAL
BH CV LOWER VASCULAR RIGHT GREATER SAPH AK COMPRESS: NORMAL
BH CV LOWER VASCULAR RIGHT GREATER SAPH BK COMPRESS: NORMAL
BH CV LOWER VASCULAR RIGHT LESSER SAPH COMPRESS: NORMAL
BH CV LOWER VASCULAR RIGHT MID FEMORAL AUGMENT: NORMAL
BH CV LOWER VASCULAR RIGHT MID FEMORAL COMPETENT: NORMAL
BH CV LOWER VASCULAR RIGHT MID FEMORAL COMPRESS: NORMAL
BH CV LOWER VASCULAR RIGHT MID FEMORAL PHASIC: NORMAL
BH CV LOWER VASCULAR RIGHT MID FEMORAL SPONT: NORMAL
BH CV LOWER VASCULAR RIGHT PERONEAL AUGMENT: NORMAL
BH CV LOWER VASCULAR RIGHT PERONEAL COMPRESS: NORMAL
BH CV LOWER VASCULAR RIGHT POPLITEAL AUGMENT: NORMAL
BH CV LOWER VASCULAR RIGHT POPLITEAL COMPETENT: NORMAL
BH CV LOWER VASCULAR RIGHT POPLITEAL COMPRESS: NORMAL
BH CV LOWER VASCULAR RIGHT POPLITEAL PHASIC: NORMAL
BH CV LOWER VASCULAR RIGHT POPLITEAL SPONT: NORMAL
BH CV LOWER VASCULAR RIGHT POSTERIOR TIBIAL AUGMENT: NORMAL
BH CV LOWER VASCULAR RIGHT POSTERIOR TIBIAL COMPRESS: NORMAL
BH CV LOWER VASCULAR RIGHT PROFUNDA FEMORAL AUGMENT: NORMAL
BH CV LOWER VASCULAR RIGHT PROFUNDA FEMORAL COMPETENT: NORMAL
BH CV LOWER VASCULAR RIGHT PROFUNDA FEMORAL COMPRESS: NORMAL
BH CV LOWER VASCULAR RIGHT PROFUNDA FEMORAL PHASIC: NORMAL
BH CV LOWER VASCULAR RIGHT PROFUNDA FEMORAL SPONT: NORMAL
BH CV LOWER VASCULAR RIGHT PROXIMAL FEMORAL AUGMENT: NORMAL
BH CV LOWER VASCULAR RIGHT PROXIMAL FEMORAL COMPETENT: NORMAL
BH CV LOWER VASCULAR RIGHT PROXIMAL FEMORAL COMPRESS: NORMAL
BH CV LOWER VASCULAR RIGHT PROXIMAL FEMORAL PHASIC: NORMAL
BH CV LOWER VASCULAR RIGHT PROXIMAL FEMORAL SPONT: NORMAL
BH CV LOWER VASCULAR RIGHT SAPHENOFEMORAL JUNCTION AUGMENT: NORMAL
BH CV LOWER VASCULAR RIGHT SAPHENOFEMORAL JUNCTION COMPETENT: NORMAL
BH CV LOWER VASCULAR RIGHT SAPHENOFEMORAL JUNCTION COMPRESS: NORMAL
BH CV LOWER VASCULAR RIGHT SAPHENOFEMORAL JUNCTION PHASIC: NORMAL
BH CV LOWER VASCULAR RIGHT SAPHENOFEMORAL JUNCTION SPONT: NORMAL
BILIRUB CONJ SERPL-MCNC: 0.3 MG/DL (ref 0–0.3)
BILIRUB INDIRECT SERPL-MCNC: 0.6 MG/DL
BILIRUB SERPL-MCNC: 0.9 MG/DL (ref 0–1.2)
BILIRUB UR QL STRIP: NEGATIVE
BUN SERPL-MCNC: 27 MG/DL (ref 8–23)
BUN SERPL-MCNC: 28 MG/DL (ref 8–23)
BUN SERPL-MCNC: 31 MG/DL (ref 8–23)
BUN SERPL-MCNC: 32 MG/DL (ref 8–23)
BUN SERPL-MCNC: 33 MG/DL (ref 8–23)
BUN SERPL-MCNC: 34 MG/DL (ref 8–23)
BUN SERPL-MCNC: 34 MG/DL (ref 8–23)
BUN/CREAT SERPL: 12.7 (ref 7–25)
BUN/CREAT SERPL: 12.7 (ref 7–25)
BUN/CREAT SERPL: 13.5 (ref 7–25)
BUN/CREAT SERPL: 15.4 (ref 7–25)
BUN/CREAT SERPL: 17.1 (ref 7–25)
BUN/CREAT SERPL: 17.1 (ref 7–25)
BUN/CREAT SERPL: 17.4 (ref 7–25)
CALCIUM SPEC-SCNC: 8.2 MG/DL (ref 8.6–10.5)
CALCIUM SPEC-SCNC: 8.6 MG/DL (ref 8.6–10.5)
CALCIUM SPEC-SCNC: 8.7 MG/DL (ref 8.6–10.5)
CALCIUM SPEC-SCNC: 8.7 MG/DL (ref 8.6–10.5)
CALCIUM SPEC-SCNC: 8.9 MG/DL (ref 8.6–10.5)
CALCIUM SPEC-SCNC: 9.1 MG/DL (ref 8.6–10.5)
CALCIUM SPEC-SCNC: 9.1 MG/DL (ref 8.6–10.5)
CHLORIDE SERPL-SCNC: 100 MMOL/L (ref 98–107)
CHLORIDE SERPL-SCNC: 95 MMOL/L (ref 98–107)
CHLORIDE SERPL-SCNC: 96 MMOL/L (ref 98–107)
CHLORIDE SERPL-SCNC: 96 MMOL/L (ref 98–107)
CHLORIDE SERPL-SCNC: 98 MMOL/L (ref 98–107)
CHLORIDE SERPL-SCNC: 98 MMOL/L (ref 98–107)
CHLORIDE SERPL-SCNC: 99 MMOL/L (ref 98–107)
CK SERPL-CCNC: 24 U/L (ref 20–180)
CLARITY UR: CLEAR
CO2 SERPL-SCNC: 27 MMOL/L (ref 22–29)
CO2 SERPL-SCNC: 28 MMOL/L (ref 22–29)
CO2 SERPL-SCNC: 28.7 MMOL/L (ref 22–29)
CO2 SERPL-SCNC: 30 MMOL/L (ref 22–29)
CO2 SERPL-SCNC: 31 MMOL/L (ref 22–29)
CO2 SERPL-SCNC: 36 MMOL/L (ref 22–29)
CO2 SERPL-SCNC: 40 MMOL/L (ref 22–29)
COLOR UR: YELLOW
CREAT SERPL-MCNC: 1.58 MG/DL (ref 0.57–1)
CREAT SERPL-MCNC: 1.64 MG/DL (ref 0.57–1)
CREAT SERPL-MCNC: 1.9 MG/DL (ref 0.57–1)
CREAT SERPL-MCNC: 2.21 MG/DL (ref 0.57–1)
CREAT SERPL-MCNC: 2.45 MG/DL (ref 0.57–1)
CREAT SERPL-MCNC: 2.51 MG/DL (ref 0.57–1)
CREAT SERPL-MCNC: 2.51 MG/DL (ref 0.57–1)
CREAT UR-MCNC: 53.5 MG/DL
DEPRECATED RDW RBC AUTO: 80.1 FL (ref 37–54)
DEPRECATED RDW RBC AUTO: 82.2 FL (ref 37–54)
EOSINOPHIL # BLD AUTO: 0.03 10*3/MM3 (ref 0–0.4)
EOSINOPHIL NFR BLD AUTO: 0.4 % (ref 0.3–6.2)
ERYTHROCYTE [DISTWIDTH] IN BLOOD BY AUTOMATED COUNT: 30.6 % (ref 12.3–15.4)
ERYTHROCYTE [DISTWIDTH] IN BLOOD BY AUTOMATED COUNT: 30.6 % (ref 12.3–15.4)
FUNGUS WND CULT: NORMAL
GFR SERPL CREATININE-BSD FRML MDRD: 18 ML/MIN/1.73
GFR SERPL CREATININE-BSD FRML MDRD: 18 ML/MIN/1.73
GFR SERPL CREATININE-BSD FRML MDRD: 19 ML/MIN/1.73
GFR SERPL CREATININE-BSD FRML MDRD: 21 ML/MIN/1.73
GFR SERPL CREATININE-BSD FRML MDRD: 25 ML/MIN/1.73
GFR SERPL CREATININE-BSD FRML MDRD: 30 ML/MIN/1.73
GFR SERPL CREATININE-BSD FRML MDRD: 31 ML/MIN/1.73
GLUCOSE SERPL-MCNC: 101 MG/DL (ref 65–99)
GLUCOSE SERPL-MCNC: 103 MG/DL (ref 65–99)
GLUCOSE SERPL-MCNC: 84 MG/DL (ref 65–99)
GLUCOSE SERPL-MCNC: 89 MG/DL (ref 65–99)
GLUCOSE SERPL-MCNC: 94 MG/DL (ref 65–99)
GLUCOSE SERPL-MCNC: 98 MG/DL (ref 65–99)
GLUCOSE SERPL-MCNC: 99 MG/DL (ref 65–99)
GLUCOSE UR STRIP-MCNC: NEGATIVE MG/DL
HCT VFR BLD AUTO: 34.1 % (ref 34–46.6)
HCT VFR BLD AUTO: 34.8 % (ref 34–46.6)
HGB BLD-MCNC: 9.9 G/DL (ref 12–15.9)
HGB BLD-MCNC: 9.9 G/DL (ref 12–15.9)
HGB UR QL STRIP.AUTO: NEGATIVE
HYALINE CASTS UR QL AUTO: NORMAL /LPF
HYPOCHROMIA BLD QL: NORMAL
IMM GRANULOCYTES # BLD AUTO: 0.03 10*3/MM3 (ref 0–0.05)
IMM GRANULOCYTES NFR BLD AUTO: 0.4 % (ref 0–0.5)
KETONES UR QL STRIP: NEGATIVE
LEUKOCYTE ESTERASE UR QL STRIP.AUTO: ABNORMAL
LYMPHOCYTES # BLD AUTO: 1.06 10*3/MM3 (ref 0.7–3.1)
LYMPHOCYTES NFR BLD AUTO: 14.5 % (ref 19.6–45.3)
MAGNESIUM SERPL-MCNC: 1.4 MG/DL (ref 1.6–2.4)
MAGNESIUM SERPL-MCNC: 1.8 MG/DL (ref 1.6–2.4)
MAXIMAL PREDICTED HEART RATE: 138 BPM
MCH RBC QN AUTO: 23.1 PG (ref 26.6–33)
MCH RBC QN AUTO: 23.3 PG (ref 26.6–33)
MCHC RBC AUTO-ENTMCNC: 28.4 G/DL (ref 31.5–35.7)
MCHC RBC AUTO-ENTMCNC: 29 G/DL (ref 31.5–35.7)
MCV RBC AUTO: 80.4 FL (ref 79–97)
MCV RBC AUTO: 81.1 FL (ref 79–97)
MONOCYTES # BLD AUTO: 1 10*3/MM3 (ref 0.1–0.9)
MONOCYTES NFR BLD AUTO: 13.6 % (ref 5–12)
MYCOBACTERIUM SPEC CULT: NORMAL
NEUTROPHILS NFR BLD AUTO: 5.18 10*3/MM3 (ref 1.7–7)
NEUTROPHILS NFR BLD AUTO: 70.7 % (ref 42.7–76)
NIGHT BLUE STAIN TISS: NORMAL
NITRITE UR QL STRIP: NEGATIVE
NRBC BLD AUTO-RTO: 0 /100 WBC (ref 0–0.2)
OVALOCYTES BLD QL SMEAR: NORMAL
PH UR STRIP.AUTO: <=5 [PH] (ref 5–8)
PHOSPHATE SERPL-MCNC: 2.7 MG/DL (ref 2.5–4.5)
PLAT MORPH BLD: NORMAL
PLATELET # BLD AUTO: 262 10*3/MM3 (ref 140–450)
PLATELET # BLD AUTO: 263 10*3/MM3 (ref 140–450)
PMV BLD AUTO: 9.1 FL (ref 6–12)
PMV BLD AUTO: 9.2 FL (ref 6–12)
POTASSIUM SERPL-SCNC: 3.4 MMOL/L (ref 3.5–5.2)
POTASSIUM SERPL-SCNC: 3.8 MMOL/L (ref 3.5–5.2)
POTASSIUM SERPL-SCNC: 4.2 MMOL/L (ref 3.5–5.2)
POTASSIUM SERPL-SCNC: 4.4 MMOL/L (ref 3.5–5.2)
POTASSIUM SERPL-SCNC: 4.5 MMOL/L (ref 3.5–5.2)
PROT ?TM UR-MCNC: 6.9 MG/DL
PROT SERPL-MCNC: 6.8 G/DL (ref 6–8.5)
PROT UR QL STRIP: ABNORMAL
RBC # BLD AUTO: 4.24 10*6/MM3 (ref 3.77–5.28)
RBC # BLD AUTO: 4.29 10*6/MM3 (ref 3.77–5.28)
RBC # UR STRIP: NORMAL /HPF
REF LAB TEST METHOD: NORMAL
SARS-COV-2 RNA NOSE QL NAA+PROBE: NOT DETECTED
SARS-COV-2 RNA PNL SPEC NAA+PROBE: NOT DETECTED
SODIUM SERPL-SCNC: 138 MMOL/L (ref 136–145)
SODIUM SERPL-SCNC: 138 MMOL/L (ref 136–145)
SODIUM SERPL-SCNC: 139 MMOL/L (ref 136–145)
SODIUM SERPL-SCNC: 139 MMOL/L (ref 136–145)
SODIUM SERPL-SCNC: 140 MMOL/L (ref 136–145)
SODIUM SERPL-SCNC: 141 MMOL/L (ref 136–145)
SODIUM SERPL-SCNC: 144 MMOL/L (ref 136–145)
SODIUM UR-SCNC: <20 MMOL/L
SP GR UR STRIP: 1.01 (ref 1–1.03)
SQUAMOUS #/AREA URNS HPF: NORMAL /HPF
STRESS TARGET HR: 117 BPM
UROBILINOGEN UR QL STRIP: ABNORMAL
UUN 24H UR-MCNC: 177 MG/DL
WBC # UR STRIP: NORMAL /HPF
WBC MORPH BLD: NORMAL
WBC NRBC COR # BLD: 7.33 10*3/MM3 (ref 3.4–10.8)
WBC NRBC COR # BLD: 7.48 10*3/MM3 (ref 3.4–10.8)

## 2021-01-01 PROCEDURE — 80048 BASIC METABOLIC PNL TOTAL CA: CPT | Performed by: PHYSICIAN ASSISTANT

## 2021-01-01 PROCEDURE — 63710000001 PROCHLORPERAZINE MALEATE PER 5 MG: Performed by: STUDENT IN AN ORGANIZED HEALTH CARE EDUCATION/TRAINING PROGRAM

## 2021-01-01 PROCEDURE — 25010000002 MAGNESIUM SULFATE IN D5W 1G/100ML (PREMIX) 1-5 GM/100ML-% SOLUTION: Performed by: PHYSICIAN ASSISTANT

## 2021-01-01 PROCEDURE — 85025 COMPLETE CBC W/AUTO DIFF WBC: CPT | Performed by: FAMILY MEDICINE

## 2021-01-01 PROCEDURE — 25010000002 FERRIC CARBOXYMALTOSE 750 MG/15ML SOLUTION 15 ML VIAL: Performed by: STUDENT IN AN ORGANIZED HEALTH CARE EDUCATION/TRAINING PROGRAM

## 2021-01-01 PROCEDURE — 80076 HEPATIC FUNCTION PANEL: CPT

## 2021-01-01 PROCEDURE — 82550 ASSAY OF CK (CPK): CPT | Performed by: STUDENT IN AN ORGANIZED HEALTH CARE EDUCATION/TRAINING PROGRAM

## 2021-01-01 PROCEDURE — 99232 SBSQ HOSP IP/OBS MODERATE 35: CPT | Performed by: INTERNAL MEDICINE

## 2021-01-01 PROCEDURE — 99232 SBSQ HOSP IP/OBS MODERATE 35: CPT | Performed by: PHYSICIAN ASSISTANT

## 2021-01-01 PROCEDURE — 97110 THERAPEUTIC EXERCISES: CPT

## 2021-01-01 PROCEDURE — 80069 RENAL FUNCTION PANEL: CPT | Performed by: INTERNAL MEDICINE

## 2021-01-01 PROCEDURE — 99232 SBSQ HOSP IP/OBS MODERATE 35: CPT | Performed by: STUDENT IN AN ORGANIZED HEALTH CARE EDUCATION/TRAINING PROGRAM

## 2021-01-01 PROCEDURE — 85027 COMPLETE CBC AUTOMATED: CPT | Performed by: STUDENT IN AN ORGANIZED HEALTH CARE EDUCATION/TRAINING PROGRAM

## 2021-01-01 PROCEDURE — 99233 SBSQ HOSP IP/OBS HIGH 50: CPT | Performed by: INTERNAL MEDICINE

## 2021-01-01 PROCEDURE — 63710000001 ONDANSETRON PER 8 MG: Performed by: FAMILY MEDICINE

## 2021-01-01 PROCEDURE — U0005 INFEC AGEN DETEC AMPLI PROBE: HCPCS | Performed by: FAMILY MEDICINE

## 2021-01-01 PROCEDURE — 80048 BASIC METABOLIC PNL TOTAL CA: CPT

## 2021-01-01 PROCEDURE — 63710000001 DIPHENHYDRAMINE PER 50 MG: Performed by: STUDENT IN AN ORGANIZED HEALTH CARE EDUCATION/TRAINING PROGRAM

## 2021-01-01 PROCEDURE — 97116 GAIT TRAINING THERAPY: CPT

## 2021-01-01 PROCEDURE — 93294 REM INTERROG EVL PM/LDLS PM: CPT | Performed by: INTERNAL MEDICINE

## 2021-01-01 PROCEDURE — 99214 OFFICE O/P EST MOD 30 MIN: CPT | Performed by: INTERNAL MEDICINE

## 2021-01-01 PROCEDURE — 81001 URINALYSIS AUTO W/SCOPE: CPT | Performed by: INTERNAL MEDICINE

## 2021-01-01 PROCEDURE — 99214 OFFICE O/P EST MOD 30 MIN: CPT | Performed by: STUDENT IN AN ORGANIZED HEALTH CARE EDUCATION/TRAINING PROGRAM

## 2021-01-01 PROCEDURE — 80048 BASIC METABOLIC PNL TOTAL CA: CPT | Performed by: FAMILY MEDICINE

## 2021-01-01 PROCEDURE — 36415 COLL VENOUS BLD VENIPUNCTURE: CPT

## 2021-01-01 PROCEDURE — 93970 EXTREMITY STUDY: CPT | Performed by: INTERNAL MEDICINE

## 2021-01-01 PROCEDURE — 84156 ASSAY OF PROTEIN URINE: CPT | Performed by: STUDENT IN AN ORGANIZED HEALTH CARE EDUCATION/TRAINING PROGRAM

## 2021-01-01 PROCEDURE — 94010 BREATHING CAPACITY TEST: CPT | Performed by: INTERNAL MEDICINE

## 2021-01-01 PROCEDURE — 63710000001 PROCHLORPERAZINE MALEATE PER 10 MG: Performed by: STUDENT IN AN ORGANIZED HEALTH CARE EDUCATION/TRAINING PROGRAM

## 2021-01-01 PROCEDURE — 85007 BL SMEAR W/DIFF WBC COUNT: CPT | Performed by: FAMILY MEDICINE

## 2021-01-01 PROCEDURE — 99211 OFF/OP EST MAY X REQ PHY/QHP: CPT | Performed by: INTERNAL MEDICINE

## 2021-01-01 PROCEDURE — 96374 THER/PROPH/DIAG INJ IV PUSH: CPT

## 2021-01-01 PROCEDURE — 97530 THERAPEUTIC ACTIVITIES: CPT

## 2021-01-01 PROCEDURE — U0004 COV-19 TEST NON-CDC HGH THRU: HCPCS | Performed by: INTERNAL MEDICINE

## 2021-01-01 PROCEDURE — 80048 BASIC METABOLIC PNL TOTAL CA: CPT | Performed by: INTERNAL MEDICINE

## 2021-01-01 PROCEDURE — U0005 INFEC AGEN DETEC AMPLI PROBE: HCPCS | Performed by: INTERNAL MEDICINE

## 2021-01-01 PROCEDURE — 84300 ASSAY OF URINE SODIUM: CPT | Performed by: FAMILY MEDICINE

## 2021-01-01 PROCEDURE — 83735 ASSAY OF MAGNESIUM: CPT | Performed by: INTERNAL MEDICINE

## 2021-01-01 PROCEDURE — 82570 ASSAY OF URINE CREATININE: CPT | Performed by: FAMILY MEDICINE

## 2021-01-01 PROCEDURE — 76775 US EXAM ABDO BACK WALL LIM: CPT

## 2021-01-01 PROCEDURE — 99223 1ST HOSP IP/OBS HIGH 75: CPT | Performed by: FAMILY MEDICINE

## 2021-01-01 PROCEDURE — 97162 PT EVAL MOD COMPLEX 30 MIN: CPT

## 2021-01-01 PROCEDURE — 99239 HOSP IP/OBS DSCHRG MGMT >30: CPT | Performed by: PHYSICIAN ASSISTANT

## 2021-01-01 PROCEDURE — U0004 COV-19 TEST NON-CDC HGH THRU: HCPCS | Performed by: FAMILY MEDICINE

## 2021-01-01 PROCEDURE — 71045 X-RAY EXAM CHEST 1 VIEW: CPT

## 2021-01-01 PROCEDURE — 93970 EXTREMITY STUDY: CPT

## 2021-01-01 PROCEDURE — 84540 ASSAY OF URINE/UREA-N: CPT | Performed by: STUDENT IN AN ORGANIZED HEALTH CARE EDUCATION/TRAINING PROGRAM

## 2021-01-01 PROCEDURE — 99215 OFFICE O/P EST HI 40 MIN: CPT | Performed by: STUDENT IN AN ORGANIZED HEALTH CARE EDUCATION/TRAINING PROGRAM

## 2021-01-01 PROCEDURE — 83735 ASSAY OF MAGNESIUM: CPT

## 2021-01-01 PROCEDURE — 93296 REM INTERROG EVL PM/IDS: CPT | Performed by: INTERNAL MEDICINE

## 2021-01-01 RX ORDER — ACETAMINOPHEN 325 MG/1
650 TABLET ORAL ONCE
Status: CANCELLED | OUTPATIENT
Start: 2021-01-01

## 2021-01-01 RX ORDER — PROCHLORPERAZINE MALEATE 5 MG/1
10 TABLET ORAL ONCE
Status: CANCELLED | OUTPATIENT
Start: 2021-01-01 | End: 2021-01-01

## 2021-01-01 RX ORDER — POLYETHYLENE GLYCOL 3350 17 G/17G
17 POWDER, FOR SOLUTION ORAL DAILY
Status: DISCONTINUED | OUTPATIENT
Start: 2021-01-01 | End: 2021-01-01 | Stop reason: HOSPADM

## 2021-01-01 RX ORDER — POTASSIUM CHLORIDE 750 MG/1
20 TABLET, FILM COATED, EXTENDED RELEASE ORAL DAILY
Qty: 90 TABLET | Refills: 3
Start: 2021-01-01

## 2021-01-01 RX ORDER — ACETAMINOPHEN 325 MG/1
650 TABLET ORAL ONCE
Status: COMPLETED | OUTPATIENT
Start: 2021-01-01 | End: 2021-01-01

## 2021-01-01 RX ORDER — METHYLPREDNISOLONE SODIUM SUCCINATE 125 MG/2ML
125 INJECTION, POWDER, LYOPHILIZED, FOR SOLUTION INTRAMUSCULAR; INTRAVENOUS AS NEEDED
Status: CANCELLED | OUTPATIENT
Start: 2021-01-01

## 2021-01-01 RX ORDER — AMOXICILLIN 250 MG
2 CAPSULE ORAL NIGHTLY
Status: DISCONTINUED | OUTPATIENT
Start: 2021-01-01 | End: 2021-01-01 | Stop reason: HOSPADM

## 2021-01-01 RX ORDER — PROCHLORPERAZINE MALEATE 5 MG/1
10 TABLET ORAL ONCE
Status: COMPLETED | OUTPATIENT
Start: 2021-01-01 | End: 2021-01-01

## 2021-01-01 RX ORDER — ASPIRIN 81 MG/1
81 TABLET ORAL NIGHTLY
Status: DISCONTINUED | OUTPATIENT
Start: 2021-01-01 | End: 2021-01-01 | Stop reason: HOSPADM

## 2021-01-01 RX ORDER — ACETAMINOPHEN 325 MG/1
650 TABLET ORAL EVERY 4 HOURS PRN
Status: DISCONTINUED | OUTPATIENT
Start: 2021-01-01 | End: 2021-01-01 | Stop reason: HOSPADM

## 2021-01-01 RX ORDER — ONDANSETRON 4 MG/1
4 TABLET, FILM COATED ORAL EVERY 6 HOURS PRN
Status: DISCONTINUED | OUTPATIENT
Start: 2021-01-01 | End: 2021-01-01 | Stop reason: HOSPADM

## 2021-01-01 RX ORDER — MAGNESIUM SULFATE HEPTAHYDRATE 40 MG/ML
2 INJECTION, SOLUTION INTRAVENOUS AS NEEDED
Status: DISCONTINUED | OUTPATIENT
Start: 2021-01-01 | End: 2021-01-01 | Stop reason: HOSPADM

## 2021-01-01 RX ORDER — BUMETANIDE 0.25 MG/ML
2 INJECTION INTRAMUSCULAR; INTRAVENOUS ONCE
Status: COMPLETED | OUTPATIENT
Start: 2021-01-01 | End: 2021-01-01

## 2021-01-01 RX ORDER — DIPHENHYDRAMINE HCL 25 MG
25 CAPSULE ORAL ONCE
Status: DISCONTINUED | OUTPATIENT
Start: 2021-01-01 | End: 2021-01-01 | Stop reason: HOSPADM

## 2021-01-01 RX ORDER — PROCHLORPERAZINE MALEATE 10 MG
10 TABLET ORAL ONCE
Status: CANCELLED | OUTPATIENT
Start: 2021-01-01 | End: 2021-01-01

## 2021-01-01 RX ORDER — DIPHENHYDRAMINE HCL 25 MG
25 CAPSULE ORAL ONCE
Status: CANCELLED | OUTPATIENT
Start: 2021-01-01

## 2021-01-01 RX ORDER — POTASSIUM CHLORIDE 750 MG/1
40 CAPSULE, EXTENDED RELEASE ORAL ONCE
Status: COMPLETED | OUTPATIENT
Start: 2021-01-01 | End: 2021-01-01

## 2021-01-01 RX ORDER — PAROXETINE HYDROCHLORIDE 20 MG/1
20 TABLET, FILM COATED ORAL DAILY
Status: DISCONTINUED | OUTPATIENT
Start: 2021-01-01 | End: 2021-01-01 | Stop reason: HOSPADM

## 2021-01-01 RX ORDER — PAROXETINE HYDROCHLORIDE 20 MG/1
TABLET, FILM COATED ORAL
Status: ON HOLD | COMMUNITY
Start: 2021-11-19 | End: 2021-01-01 | Stop reason: SDUPTHER

## 2021-01-01 RX ORDER — LOSARTAN POTASSIUM 100 MG/1
TABLET ORAL
COMMUNITY
Start: 2021-11-20 | End: 2021-01-01

## 2021-01-01 RX ORDER — NYSTATIN 100000 [USP'U]/G
POWDER TOPICAL 3 TIMES DAILY
Qty: 30 G | Refills: 1 | Status: SHIPPED | OUTPATIENT
Start: 2021-01-01 | End: 2021-01-01 | Stop reason: SDUPTHER

## 2021-01-01 RX ORDER — CETIRIZINE HYDROCHLORIDE 10 MG/1
10 TABLET ORAL ONCE
Status: CANCELLED | OUTPATIENT
Start: 2021-01-01 | End: 2021-01-01

## 2021-01-01 RX ORDER — FUROSEMIDE 20 MG/1
40 TABLET ORAL DAILY
Qty: 90 TABLET | Refills: 1
Start: 2021-01-01 | End: 2021-01-01 | Stop reason: HOSPADM

## 2021-01-01 RX ORDER — TRAMADOL HYDROCHLORIDE 50 MG/1
50 TABLET ORAL EVERY 6 HOURS PRN
COMMUNITY

## 2021-01-01 RX ORDER — CETIRIZINE HYDROCHLORIDE 10 MG/1
10 TABLET ORAL ONCE
Status: COMPLETED | OUTPATIENT
Start: 2021-01-01 | End: 2021-01-01

## 2021-01-01 RX ORDER — SODIUM CHLORIDE 9 MG/ML
250 INJECTION, SOLUTION INTRAVENOUS ONCE
Status: CANCELLED | OUTPATIENT
Start: 2021-01-01 | End: 2021-01-01

## 2021-01-01 RX ORDER — SODIUM CHLORIDE 9 MG/ML
250 INJECTION, SOLUTION INTRAVENOUS ONCE
Status: COMPLETED | OUTPATIENT
Start: 2021-01-01 | End: 2021-01-01

## 2021-01-01 RX ORDER — PROCHLORPERAZINE MALEATE 10 MG
10 TABLET ORAL ONCE
Status: COMPLETED | OUTPATIENT
Start: 2021-01-01 | End: 2021-01-01

## 2021-01-01 RX ORDER — SODIUM CHLORIDE 0.9 % (FLUSH) 0.9 %
10 SYRINGE (ML) INJECTION EVERY 12 HOURS SCHEDULED
Status: DISCONTINUED | OUTPATIENT
Start: 2021-01-01 | End: 2021-01-01 | Stop reason: HOSPADM

## 2021-01-01 RX ORDER — FUROSEMIDE 20 MG/1
TABLET ORAL
Qty: 90 TABLET | Refills: 1 | Status: SHIPPED | OUTPATIENT
Start: 2021-01-01 | End: 2021-01-01

## 2021-01-01 RX ORDER — ONDANSETRON 4 MG/1
4 TABLET, FILM COATED ORAL EVERY 8 HOURS PRN
Qty: 9 TABLET | Refills: 3 | Status: SHIPPED | OUTPATIENT
Start: 2021-01-01 | End: 2022-01-01 | Stop reason: SDUPTHER

## 2021-01-01 RX ORDER — BUMETANIDE 1 MG/1
1 TABLET ORAL EVERY OTHER DAY
Qty: 30 TABLET | Refills: 5 | Status: SHIPPED | OUTPATIENT
Start: 2021-01-01

## 2021-01-01 RX ORDER — PAROXETINE HYDROCHLORIDE 20 MG/1
20 TABLET, FILM COATED ORAL EVERY MORNING
Start: 2021-01-01

## 2021-01-01 RX ORDER — TRAZODONE HYDROCHLORIDE 50 MG/1
50 TABLET ORAL NIGHTLY PRN
Status: DISCONTINUED | OUTPATIENT
Start: 2021-01-01 | End: 2021-01-01 | Stop reason: HOSPADM

## 2021-01-01 RX ORDER — SODIUM CHLORIDE 0.9 % (FLUSH) 0.9 %
10 SYRINGE (ML) INJECTION AS NEEDED
Status: DISCONTINUED | OUTPATIENT
Start: 2021-01-01 | End: 2021-01-01 | Stop reason: HOSPADM

## 2021-01-01 RX ORDER — NYSTATIN 100000 [USP'U]/G
POWDER TOPICAL 3 TIMES DAILY
Qty: 30 G | Refills: 1 | Status: SHIPPED | OUTPATIENT
Start: 2021-01-01

## 2021-01-01 RX ORDER — BUSPIRONE HYDROCHLORIDE 5 MG/1
10 TABLET ORAL 2 TIMES DAILY
Qty: 120 TABLET | Refills: 2 | Status: SHIPPED | OUTPATIENT
Start: 2021-01-01 | End: 2022-01-01 | Stop reason: SDUPTHER

## 2021-01-01 RX ORDER — METOPROLOL SUCCINATE 50 MG/1
50 TABLET, EXTENDED RELEASE ORAL DAILY
Status: DISCONTINUED | OUTPATIENT
Start: 2021-01-01 | End: 2021-01-01 | Stop reason: HOSPADM

## 2021-01-01 RX ORDER — MAGNESIUM SULFATE 1 G/100ML
1 INJECTION INTRAVENOUS AS NEEDED
Status: DISCONTINUED | OUTPATIENT
Start: 2021-01-01 | End: 2021-01-01 | Stop reason: HOSPADM

## 2021-01-01 RX ORDER — ONDANSETRON 2 MG/ML
4 INJECTION INTRAMUSCULAR; INTRAVENOUS EVERY 6 HOURS PRN
Status: DISCONTINUED | OUTPATIENT
Start: 2021-01-01 | End: 2021-01-01 | Stop reason: HOSPADM

## 2021-01-01 RX ORDER — CETIRIZINE HYDROCHLORIDE 10 MG/1
10 TABLET ORAL ONCE
Status: DISCONTINUED | OUTPATIENT
Start: 2021-01-01 | End: 2021-01-01 | Stop reason: SDUPTHER

## 2021-01-01 RX ORDER — LEVOTHYROXINE SODIUM 0.05 MG/1
50 TABLET ORAL EVERY MORNING
Status: DISCONTINUED | OUTPATIENT
Start: 2021-01-01 | End: 2021-01-01 | Stop reason: HOSPADM

## 2021-01-01 RX ORDER — BUSPIRONE HYDROCHLORIDE 10 MG/1
10 TABLET ORAL 2 TIMES DAILY
Status: DISCONTINUED | OUTPATIENT
Start: 2021-01-01 | End: 2021-01-01

## 2021-01-01 RX ORDER — DIPHENHYDRAMINE HYDROCHLORIDE 50 MG/ML
50 INJECTION INTRAMUSCULAR; INTRAVENOUS AS NEEDED
Status: CANCELLED | OUTPATIENT
Start: 2021-01-01

## 2021-01-01 RX ORDER — BUMETANIDE 1 MG/1
1 TABLET ORAL EVERY OTHER DAY
Status: DISCONTINUED | OUTPATIENT
Start: 2021-01-01 | End: 2021-01-01 | Stop reason: HOSPADM

## 2021-01-01 RX ORDER — ROSUVASTATIN CALCIUM 20 MG/1
20 TABLET, COATED ORAL 3 TIMES WEEKLY
Status: DISCONTINUED | OUTPATIENT
Start: 2021-01-01 | End: 2021-01-01 | Stop reason: HOSPADM

## 2021-01-01 RX ORDER — MAGNESIUM SULFATE HEPTAHYDRATE 40 MG/ML
4 INJECTION, SOLUTION INTRAVENOUS AS NEEDED
Status: DISCONTINUED | OUTPATIENT
Start: 2021-01-01 | End: 2021-01-01 | Stop reason: HOSPADM

## 2021-01-01 RX ORDER — ACETAMINOPHEN 160 MG/5ML
650 SOLUTION ORAL EVERY 4 HOURS PRN
Status: DISCONTINUED | OUTPATIENT
Start: 2021-01-01 | End: 2021-01-01 | Stop reason: HOSPADM

## 2021-01-01 RX ORDER — ACETAMINOPHEN 650 MG/1
650 SUPPOSITORY RECTAL EVERY 4 HOURS PRN
Status: DISCONTINUED | OUTPATIENT
Start: 2021-01-01 | End: 2021-01-01 | Stop reason: HOSPADM

## 2021-01-01 RX ORDER — TRAMADOL HYDROCHLORIDE 50 MG/1
50 TABLET ORAL EVERY 6 HOURS PRN
Status: DISCONTINUED | OUTPATIENT
Start: 2021-01-01 | End: 2021-01-01 | Stop reason: HOSPADM

## 2021-01-01 RX ADMIN — ASPIRIN 81 MG: 81 TABLET, COATED ORAL at 20:03

## 2021-01-01 RX ADMIN — DOCUSATE SODIUM 50 MG AND SENNOSIDES 8.6 MG 2 TABLET: 8.6; 5 TABLET, FILM COATED ORAL at 11:12

## 2021-01-01 RX ADMIN — APIXABAN 2.5 MG: 2.5 TABLET, FILM COATED ORAL at 20:03

## 2021-01-01 RX ADMIN — ASPIRIN 81 MG: 81 TABLET, COATED ORAL at 20:16

## 2021-01-01 RX ADMIN — PAROXETINE HYDROCHLORIDE 20 MG: 20 TABLET, FILM COATED ORAL at 09:06

## 2021-01-01 RX ADMIN — ACETAMINOPHEN 650 MG: 325 TABLET, FILM COATED ORAL at 15:57

## 2021-01-01 RX ADMIN — PROCHLORPERAZINE MALEATE 10 MG: 5 TABLET ORAL at 14:01

## 2021-01-01 RX ADMIN — APIXABAN 2.5 MG: 2.5 TABLET, FILM COATED ORAL at 22:14

## 2021-01-01 RX ADMIN — ACETAMINOPHEN 650 MG: 325 TABLET ORAL at 14:01

## 2021-01-01 RX ADMIN — PAROXETINE HYDROCHLORIDE 20 MG: 20 TABLET, FILM COATED ORAL at 08:19

## 2021-01-01 RX ADMIN — CETIRIZINE HYDROCHLORIDE 10 MG: 10 TABLET, FILM COATED ORAL at 14:01

## 2021-01-01 RX ADMIN — PAROXETINE HYDROCHLORIDE 20 MG: 20 TABLET, FILM COATED ORAL at 10:32

## 2021-01-01 RX ADMIN — FERRIC CARBOXYMALTOSE INJECTION 750 MG: 50 INJECTION, SOLUTION INTRAVENOUS at 16:02

## 2021-01-01 RX ADMIN — APIXABAN 2.5 MG: 2.5 TABLET, FILM COATED ORAL at 21:41

## 2021-01-01 RX ADMIN — APIXABAN 2.5 MG: 2.5 TABLET, FILM COATED ORAL at 09:06

## 2021-01-01 RX ADMIN — FERRIC CARBOXYMALTOSE INJECTION 750 MG: 50 INJECTION, SOLUTION INTRAVENOUS at 14:06

## 2021-01-01 RX ADMIN — APIXABAN 2.5 MG: 2.5 TABLET, FILM COATED ORAL at 08:19

## 2021-01-01 RX ADMIN — SODIUM CHLORIDE, PRESERVATIVE FREE 10 ML: 5 INJECTION INTRAVENOUS at 10:32

## 2021-01-01 RX ADMIN — APIXABAN 2.5 MG: 2.5 TABLET, FILM COATED ORAL at 10:32

## 2021-01-01 RX ADMIN — METOPROLOL SUCCINATE 50 MG: 50 TABLET, EXTENDED RELEASE ORAL at 09:06

## 2021-01-01 RX ADMIN — PAROXETINE HYDROCHLORIDE 20 MG: 20 TABLET, FILM COATED ORAL at 08:48

## 2021-01-01 RX ADMIN — LEVOTHYROXINE SODIUM 50 MCG: 50 TABLET ORAL at 09:06

## 2021-01-01 RX ADMIN — METOPROLOL SUCCINATE 50 MG: 50 TABLET, EXTENDED RELEASE ORAL at 08:48

## 2021-01-01 RX ADMIN — METOPROLOL SUCCINATE 50 MG: 50 TABLET, EXTENDED RELEASE ORAL at 08:19

## 2021-01-01 RX ADMIN — ASPIRIN 81 MG: 81 TABLET, COATED ORAL at 20:41

## 2021-01-01 RX ADMIN — SODIUM CHLORIDE, PRESERVATIVE FREE 10 ML: 5 INJECTION INTRAVENOUS at 08:39

## 2021-01-01 RX ADMIN — PAROXETINE HYDROCHLORIDE 20 MG: 20 TABLET, FILM COATED ORAL at 08:38

## 2021-01-01 RX ADMIN — APIXABAN 2.5 MG: 2.5 TABLET, FILM COATED ORAL at 08:48

## 2021-01-01 RX ADMIN — MAGNESIUM SULFATE HEPTAHYDRATE 1 G: 1 INJECTION, SOLUTION INTRAVENOUS at 12:44

## 2021-01-01 RX ADMIN — ASPIRIN 81 MG: 81 TABLET, COATED ORAL at 21:41

## 2021-01-01 RX ADMIN — DOCUSATE SODIUM 50 MG AND SENNOSIDES 8.6 MG 2 TABLET: 8.6; 5 TABLET, FILM COATED ORAL at 20:41

## 2021-01-01 RX ADMIN — PROCHLORPERAZINE MALEATE 10 MG: 10 TABLET ORAL at 15:58

## 2021-01-01 RX ADMIN — TRAZODONE HYDROCHLORIDE 50 MG: 50 TABLET ORAL at 01:59

## 2021-01-01 RX ADMIN — TRAZODONE HYDROCHLORIDE 50 MG: 50 TABLET ORAL at 20:05

## 2021-01-01 RX ADMIN — POLYETHYLENE GLYCOL 3350 17 G: 17 POWDER, FOR SOLUTION ORAL at 11:12

## 2021-01-01 RX ADMIN — ONDANSETRON HYDROCHLORIDE 4 MG: 4 TABLET, FILM COATED ORAL at 07:23

## 2021-01-01 RX ADMIN — TRAZODONE HYDROCHLORIDE 50 MG: 50 TABLET ORAL at 23:15

## 2021-01-01 RX ADMIN — POTASSIUM CHLORIDE 40 MEQ: 750 CAPSULE, EXTENDED RELEASE ORAL at 12:07

## 2021-01-01 RX ADMIN — APIXABAN 2.5 MG: 2.5 TABLET, FILM COATED ORAL at 20:16

## 2021-01-01 RX ADMIN — SODIUM CHLORIDE 250 ML: 9 INJECTION, SOLUTION INTRAVENOUS at 16:02

## 2021-01-01 RX ADMIN — SODIUM CHLORIDE, PRESERVATIVE FREE 10 ML: 5 INJECTION INTRAVENOUS at 20:41

## 2021-01-01 RX ADMIN — BUMETANIDE 2 MG: 0.25 INJECTION, SOLUTION INTRAMUSCULAR; INTRAVENOUS at 16:07

## 2021-01-01 RX ADMIN — APIXABAN 2.5 MG: 2.5 TABLET, FILM COATED ORAL at 08:38

## 2021-01-01 RX ADMIN — BUMETANIDE 2 MG: 0.25 INJECTION, SOLUTION INTRAMUSCULAR; INTRAVENOUS at 12:07

## 2021-01-01 RX ADMIN — ROSUVASTATIN CALCIUM 20 MG: 20 TABLET, COATED ORAL at 08:19

## 2021-01-01 RX ADMIN — LEVOTHYROXINE SODIUM 50 MCG: 50 TABLET ORAL at 08:19

## 2021-01-01 RX ADMIN — BUMETANIDE 2 MG: 0.25 INJECTION, SOLUTION INTRAMUSCULAR; INTRAVENOUS at 12:17

## 2021-01-01 RX ADMIN — SODIUM CHLORIDE, PRESERVATIVE FREE 10 ML: 5 INJECTION INTRAVENOUS at 09:07

## 2021-01-01 RX ADMIN — MAGNESIUM SULFATE HEPTAHYDRATE 1 G: 1 INJECTION, SOLUTION INTRAVENOUS at 11:44

## 2021-01-01 RX ADMIN — LEVOTHYROXINE SODIUM 50 MCG: 50 TABLET ORAL at 08:48

## 2021-01-01 RX ADMIN — MAGNESIUM SULFATE HEPTAHYDRATE 1 G: 1 INJECTION, SOLUTION INTRAVENOUS at 13:45

## 2021-01-01 RX ADMIN — BUMETANIDE 2 MG: 0.25 INJECTION, SOLUTION INTRAMUSCULAR; INTRAVENOUS at 21:41

## 2021-01-01 RX ADMIN — ROSUVASTATIN CALCIUM 20 MG: 20 TABLET, COATED ORAL at 10:32

## 2021-01-01 RX ADMIN — METOPROLOL SUCCINATE 50 MG: 50 TABLET, EXTENDED RELEASE ORAL at 10:32

## 2021-01-01 RX ADMIN — SODIUM CHLORIDE, PRESERVATIVE FREE 10 ML: 5 INJECTION INTRAVENOUS at 20:19

## 2021-01-01 RX ADMIN — TRAMADOL HYDROCHLORIDE 50 MG: 50 TABLET, COATED ORAL at 13:39

## 2021-01-01 RX ADMIN — TRAZODONE HYDROCHLORIDE 50 MG: 50 TABLET ORAL at 00:32

## 2021-01-01 RX ADMIN — APIXABAN 2.5 MG: 2.5 TABLET, FILM COATED ORAL at 20:41

## 2021-01-01 RX ADMIN — ASPIRIN 81 MG: 81 TABLET, COATED ORAL at 22:14

## 2021-01-01 RX ADMIN — METOPROLOL SUCCINATE 50 MG: 50 TABLET, EXTENDED RELEASE ORAL at 08:39

## 2021-01-01 RX ADMIN — SODIUM CHLORIDE, PRESERVATIVE FREE 10 ML: 5 INJECTION INTRAVENOUS at 21:41

## 2021-01-01 RX ADMIN — LEVOTHYROXINE SODIUM 50 MCG: 50 TABLET ORAL at 08:38

## 2021-01-01 RX ADMIN — LEVOTHYROXINE SODIUM 50 MCG: 50 TABLET ORAL at 10:32

## 2021-01-01 RX ADMIN — SODIUM CHLORIDE, PRESERVATIVE FREE 10 ML: 5 INJECTION INTRAVENOUS at 22:14

## 2021-01-12 RX ORDER — LOSARTAN POTASSIUM 100 MG/1
TABLET ORAL
Qty: 90 TABLET | Refills: 1 | Status: SHIPPED | OUTPATIENT
Start: 2021-01-12 | End: 2021-08-26

## 2021-01-27 RX ORDER — SILDENAFIL CITRATE 20 MG/1
TABLET ORAL
Qty: 90 TABLET | Refills: 3 | Status: SHIPPED | OUTPATIENT
Start: 2021-01-27 | End: 2021-01-29 | Stop reason: SDUPTHER

## 2021-01-29 ENCOUNTER — OFFICE VISIT (OUTPATIENT)
Dept: CARDIOLOGY | Facility: CLINIC | Age: 81
End: 2021-01-29

## 2021-01-29 VITALS
DIASTOLIC BLOOD PRESSURE: 64 MMHG | OXYGEN SATURATION: 95 % | BODY MASS INDEX: 25.95 KG/M2 | HEART RATE: 101 BPM | HEIGHT: 64 IN | WEIGHT: 152 LBS | SYSTOLIC BLOOD PRESSURE: 110 MMHG

## 2021-01-29 DIAGNOSIS — I10 ESSENTIAL HYPERTENSION: ICD-10-CM

## 2021-01-29 DIAGNOSIS — I27.20 PULMONARY HTN (HCC): ICD-10-CM

## 2021-01-29 DIAGNOSIS — I48.0 PAROXYSMAL ATRIAL FIBRILLATION (HCC): ICD-10-CM

## 2021-01-29 DIAGNOSIS — I25.119 CORONARY ARTERY DISEASE INVOLVING NATIVE CORONARY ARTERY OF NATIVE HEART WITH ANGINA PECTORIS (HCC): Primary | ICD-10-CM

## 2021-01-29 DIAGNOSIS — I38 HEART VALVE DISEASE: ICD-10-CM

## 2021-01-29 PROCEDURE — 99214 OFFICE O/P EST MOD 30 MIN: CPT | Performed by: INTERNAL MEDICINE

## 2021-01-29 RX ORDER — SILDENAFIL CITRATE 20 MG/1
20 TABLET ORAL 3 TIMES DAILY
Qty: 90 TABLET | Refills: 3 | Status: SHIPPED | OUTPATIENT
Start: 2021-01-29 | End: 2021-10-04

## 2021-01-29 NOTE — PROGRESS NOTES
"[facility] Cardiology    Encounter Date: 2021    Patient ID: Kenisha Ruvalcaba is a 80 y.o. female.  : 1940     PCP: Lana Flores MD       Chief Complaint: pulmonary hyperstension      PROBLEM LIST:  1. Pulmonary hypertension  a. Echo 2019: LVEF normal, fibrocalcific AV, \"mild\" AS/AI, severe TR with RVSP 95mmHg   b. RHC. 2020: Severe mixed pulmonary hypertension. Mild response to inhaled 100% oxygen and significant response to nitrous oxide.  2. Paroxysmal atrial fibrillation:  a. Coumadin initiation, 2010.  b. Initiation of Tambocor and subsequent external cardioversion, 2010.  c. External cardioversion to sinus rhythm, 2010.  d. CHADS-VASc score equals 4 to 6, on Eliquis  e. Echocardiogram 10/2016: EF >70%, grade II diastolic dysfunction, LA cavity mildly dilated, mild MR, RVSP 45-55 mmHg  f. Recurrent atrial fibrillation despite Flecainide, flecainide discontinued 2018  g. Tikosyn initiation 2018, Metoprolol increased to 100mg BID   h. S/p successful RFA of the pulmonary veins, atrial tachycardia, right atrial isthmus dependent flutter, and RFA of focal AF and long highly fractionated LA signals, 19  3. Sick sinus syndrome:   a. Event recorder 16: 9% AF, 3.3 sec pauses, HR range from 37 bpm - 151 bpm  b. PM implant 17- BTK  4. Mild coronary artery disease on cardiac catheterization, 2013 and [facility] on 16, normal EF  a. Stress MPS 2019: no evidence of ischemia, normal LVEF  5. Carotid artery stenosis, status post left CEA  a. Carotid ultrasound, 2013, with no significant stenosis.  6. Valvular heart disease:  a. Echo 19: Fibrocalcific changes are noted in the aortic valve. There is \"mild\" AS/AI. Severe TR.  7. Multiple falls  8. Hypertension.   9. Dyslipidemia.   10. Vertigo.   11. Anxiety.   12. Surgical history:  a. Tubal ligation.   b. Left CEA, 2008.  c. Left Hip " replacement     History of Present Illness  Patient presents today for a 6 month follow-up with a history of pulmonary hypertension, paroxysmal atrial fibrillation, coronary artery disease, and cardiac risk factors. Since last visit, she is done well from a cardiac standpoint.  She has no complaint of exertional chest pain, she has minimal exertional dyspnea.  She denies orthopnea, PND, claudication.  She has minimal lower extremity edema which is self-limiting.  She has rare awareness of irregular heartbeats, denies dizziness or syncope.  States her blood pressure at home typically runs about 137 mmHg systolic.  She states that her lipids are managed by primary care and reported to be favorable on her current medical regimen.  She states compliance with current medical regimen and reports no significant adverse side effects.  She requests a refill of sildenafil.    Allergies   Allergen Reactions   • Beta Adrenergic Blockers Dizziness     DIZZINESS    • Sulfa Antibiotics Rash     RASH          Current Outpatient Medications:   •  acetaminophen (TYLENOL) 325 MG tablet, Take 2 tablets by mouth Every 4 (Four) Hours As Needed for Mild Pain ., Disp: , Rfl:   •  aspirin 81 MG EC tablet, Take 81 mg by mouth Every Night., Disp: , Rfl:   •  cholecalciferol (VITAMIN D3) 1000 UNITS tablet, Take 1,000 Units by mouth Daily., Disp: , Rfl:   •  coenzyme Q10 100 MG capsule, Take 100 mg by mouth Daily., Disp: , Rfl:   •  ELIQUIS 5 MG tablet tablet, TAKE 1 BY MOUTH TWICE A DAY, Disp: 60 tablet, Rfl: 11  •  furosemide (LASIX) 20 MG tablet, TAKE 1 TABLET BY MOUTH EVERY DAY AS NEEDED FOR EDEMA, Disp: 90 tablet, Rfl: 3  •  levothyroxine (SYNTHROID, LEVOTHROID) 25 MCG tablet, Take 50 mcg by mouth Every Morning., Disp: , Rfl:   •  losartan (COZAAR) 100 MG tablet, TAKE 1 TABLET BY MOUTH EVERY DAY, Disp: 90 tablet, Rfl: 1  •  meclizine (ANTIVERT) 25 MG tablet, Take 25 mg by mouth 3 (Three) Times a Day As Needed for dizziness., Disp: , Rfl:  "  •  metoprolol tartrate (LOPRESSOR) 100 MG tablet, Take 1.5 tabs PO BID, Disp: 270 tablet, Rfl: 3  •  nitroglycerin (NITROSTAT) 0.4 MG SL tablet, Place 0.4 mg under the tongue Every 5 (Five) Minutes As Needed for chest pain. Take no more than 3 doses in 15 minutes., Disp: , Rfl:   •  PARoxetine (PAXIL) 10 MG tablet, Take 10 mg by mouth Every Night., Disp: , Rfl:   •  potassium chloride (K-DUR) 10 MEQ CR tablet, TAKE 1 TABLET BY MOUTH DAILY. TAKE WITH LASIX, Disp: 90 tablet, Rfl: 3  •  rosuvastatin (CRESTOR) 20 MG tablet, Take 20 mg by mouth 3 (Three) Times a Week. MON, WEDS, FRI, Disp: , Rfl:   •  sildenafil (REVATIO) 20 MG tablet, TAKE ONE TABLET BY MOUTH THREE TIMES A DAY, Disp: 90 tablet, Rfl: 3  •  traZODone (DESYREL) 50 MG tablet, TAKE 1-2 TABLETS AT BEDTIME AS NEEDED INSOMNIA, Disp: , Rfl: 5    The following portions of the patient's history were reviewed and updated as appropriate: allergies, current medications, past family history, past medical history, past social history, past surgical history and problem list.        Objective:     /64 (BP Location: Right arm, Patient Position: Sitting)   Pulse 101   Ht 161.3 cm (63.5\")   Wt 68.9 kg (152 lb)   LMP  (LMP Unknown)   SpO2 95%   BMI 26.50 kg/m²      Constitutional:       Appearance: Well-developed.   Pulmonary:      Effort: Pulmonary effort is normal. No respiratory distress.      Breath sounds: Normal breath sounds. No wheezing. No rales.      Comments: Bases clear  Chest:      Chest wall: Not tender to palpatation.   Cardiovascular:      Normal rate. Regular rhythm.      No gallop.   Pulses:     Intact distal pulses.   Musculoskeletal: Normal range of motion.       Data Review:   Lab date: 06/12/2020  • FLP: , TG 80, HDL 55, LDL 77  • CMP: Glu 100, BUN 20, Creat 0.86, eGFR 63, Na 143, K 4.2, Cl 103, CO2 29, Ca 9.6, Alk Phos 118, AST 26, ALT 27  • CBC: WBC 4.8, RBC 4.2, HGB 11.8, HCT 34.8, MCV 83, MCH 28,     Lab Results "   Component Value Date    GLUCOSE 110 (H) 01/30/2020    BUN 18 01/30/2020    CREATININE 0.85 01/30/2020    EGFRIFNONA 64 01/30/2020    BCR 21.2 01/30/2020    K 3.6 01/30/2020    CO2 28.0 01/30/2020    CALCIUM 9.7 01/30/2020    ALBUMIN 4.65 01/30/2019    AST 31 01/30/2019    ALT 30 01/30/2019     Lab Results   Component Value Date    CHOL 108 01/30/2020    TRIG 73 01/30/2020    HDL 40 01/30/2020    LDL 53 01/30/2020      Lab Results   Component Value Date    WBC 5.10 01/30/2020    RBC 4.60 01/30/2020    HGB 11.1 (L) 01/30/2020    HCT 37.2 01/30/2020    MCV 80.9 01/30/2020     01/30/2020        Procedures       Assessment:      Diagnosis Plan   1. Coronary artery disease involving native coronary artery of native heart with angina pectoris (CMS/HCC)   no current anginal symptoms, continue current medical regimen   2. Essential hypertension   well managed on her current medical regimen   3. Pulmonary HTN (CMS/HCC)   stable, refilled her sildenafil at current dose   4. Heart valve disease   stable no current anginal or heart failure symptoms   5. Paroxysmal atrial fibrillation (CMS/HCC)   chronically anticoagulated.     Plan:     Continue current medications.   FU in 6 MO, sooner as needed.  Thank you for allowing us to participate in the care of your patient.         Electronically signed by JOSI Ortiz, 01/29/21, 12:30 PM EST.      Please note that portions of this note may have been completed with a voice recognition program. Efforts were made to edit the dictations, but occasionally words are mistranscribed.

## 2021-02-17 ENCOUNTER — OFFICE VISIT (OUTPATIENT)
Dept: CARDIOLOGY | Facility: CLINIC | Age: 81
End: 2021-02-17

## 2021-02-17 ENCOUNTER — TELEPHONE (OUTPATIENT)
Dept: CARDIOLOGY | Facility: CLINIC | Age: 81
End: 2021-02-17

## 2021-02-17 VITALS
DIASTOLIC BLOOD PRESSURE: 66 MMHG | HEIGHT: 64 IN | BODY MASS INDEX: 26.12 KG/M2 | WEIGHT: 153 LBS | SYSTOLIC BLOOD PRESSURE: 122 MMHG | HEART RATE: 93 BPM

## 2021-02-17 DIAGNOSIS — I10 ESSENTIAL HYPERTENSION: ICD-10-CM

## 2021-02-17 DIAGNOSIS — I49.5 TACHYCARDIA-BRADYCARDIA SYNDROME (HCC): ICD-10-CM

## 2021-02-17 DIAGNOSIS — I48.0 PAROXYSMAL ATRIAL FIBRILLATION (HCC): Primary | ICD-10-CM

## 2021-02-17 PROCEDURE — 93280 PM DEVICE PROGR EVAL DUAL: CPT | Performed by: INTERNAL MEDICINE

## 2021-02-17 PROCEDURE — 99213 OFFICE O/P EST LOW 20 MIN: CPT | Performed by: INTERNAL MEDICINE

## 2021-02-17 NOTE — PROGRESS NOTES
Kenisha ACOSTA Park  1940  [phone]    02/17/2021    [facility] CARDIOLOGY     Referring Provider: No ref. provider found     Lana Flores MD  [address] [facility] [address] [place], [place] [id number]    Chief Complaint   Patient presents with   • Paroxysmal atrial fibrillation (CMS/[facility])       Problem List:   1. Paroxysmal atrial fibrillation:  a. Echocardiogram, 09/10/2010: Left atrium 4.6. Moderate left atrial enlargement. Ejection fraction 55%. Severe right atrial enlargement and 2+ tricuspid regurgitation with RVSP 37 mmHg.  b. Coumadin initiation, 09/07/2010.  c. Initiation of Tambocor and subsequent external cardioversion, 12/03/2010.  d. Echocardiogram, 12/03/2010: Moderate mitral regurgitation, moderate tricuspid regurgitation, normal left ventricular size and function, negative bubble study.   e. External cardioversion to sinus rhythm, December 2010.  f. CHADS-VASc score equals 4 to 6, on Eliquis  g. Echocardiogram 10/2016: EF >70%, grade II diastolic dysfunction, LA cavity mildly dilated, mild MR, RVSP 45-55 mmHg  h. Recurrent atrial fibrillation despite Flecainide, flecainide discontinued 09/2018  i. Tikosyn initiation 09/2018, Metoprolol increased to 100mg BID 1/19  j.  S/p successful RFA of the pulmonary veins, atrial tachycardia, right atrial isthmus dependent flutter, and RFA of focal AF and long highly fractionated LA signals, 4/8/19  2. Sick sinus syndrome:   a. Patient was hospitalized at [facility], 04/26/2016 through 04/28/2016 with a 30-day Event monitor.   b. Event recorder 12/16/16: 9% AF, 3.3 sec pauses, HR range from 37 bpm - 151 bpm  c. PM implant 2/17/17- BTK   3. Mild coronary artery disease on cardiac catheterization, 02/06/2013. And [facility] on 6/6/16, normal EF  4. Carotid artery stenosis, status post left CEA:  a. Carotid ultrasound, March 2013, with no significant stenosis.  5. Valvular heart disease:  a. Echo, 01/19/2013: Left ventricular ejection  fraction 55% to 60%, mild to moderate mitral regurgitation, RVSP 46 mmHg.   6. Hypertension.   7. Dyslipidemia.   8. Vertigo.   9. Anxiety.   10. Surgical history:  a. Tubal ligation.   b. Left CEA, March 2008.      Allergies  Allergies   Allergen Reactions   • Beta Adrenergic Blockers Dizziness     DIZZINESS    • Sulfa Antibiotics Rash     RASH        Current Medications    Current Outpatient Medications:   •  acetaminophen (TYLENOL) 325 MG tablet, Take 2 tablets by mouth Every 4 (Four) Hours As Needed for Mild Pain ., Disp: , Rfl:   •  aspirin 81 MG EC tablet, Take 81 mg by mouth Every Night., Disp: , Rfl:   •  cholecalciferol (VITAMIN D3) 1000 UNITS tablet, Take 1,000 Units by mouth Daily., Disp: , Rfl:   •  coenzyme Q10 100 MG capsule, Take 100 mg by mouth Daily., Disp: , Rfl:   •  ELIQUIS 5 MG tablet tablet, TAKE 1 BY MOUTH TWICE A DAY, Disp: 60 tablet, Rfl: 11  •  furosemide (LASIX) 20 MG tablet, TAKE 1 TABLET BY MOUTH EVERY DAY AS NEEDED FOR EDEMA, Disp: 90 tablet, Rfl: 3  •  levothyroxine (SYNTHROID, LEVOTHROID) 25 MCG tablet, Take 50 mcg by mouth Every Morning., Disp: , Rfl:   •  losartan (COZAAR) 100 MG tablet, TAKE 1 TABLET BY MOUTH EVERY DAY, Disp: 90 tablet, Rfl: 1  •  meclizine (ANTIVERT) 25 MG tablet, Take 25 mg by mouth 3 (Three) Times a Day As Needed for dizziness., Disp: , Rfl:   •  metoprolol tartrate (LOPRESSOR) 100 MG tablet, Take 1.5 tabs PO BID, Disp: 270 tablet, Rfl: 3  •  nitroglycerin (NITROSTAT) 0.4 MG SL tablet, Place 0.4 mg under the tongue Every 5 (Five) Minutes As Needed for chest pain. Take no more than 3 doses in 15 minutes., Disp: , Rfl:   •  PARoxetine (PAXIL) 10 MG tablet, Take 10 mg by mouth Every Night., Disp: , Rfl:   •  potassium chloride (K-DUR) 10 MEQ CR tablet, TAKE 1 TABLET BY MOUTH DAILY. TAKE WITH LASIX, Disp: 90 tablet, Rfl: 3  •  rosuvastatin (CRESTOR) 20 MG tablet, Take 20 mg by mouth 3 (Three) Times a Week. MON, WEDS, FRI, Disp: , Rfl:   •  sildenafil (REVATIO) 20 MG  "tablet, Take 1 tablet by mouth 3 (Three) Times a Day., Disp: 90 tablet, Rfl: 3  •  traZODone (DESYREL) 50 MG tablet, TAKE 1-2 TABLETS AT BEDTIME AS NEEDED INSOMNIA, Disp: , Rfl: 5    History of Present Illness     Pt presents for follow up of atrial fibrillation, atrial flutter with previous ablation, SSS with PM check, and HTN. Since we last saw the pt, she has had a few episodes of atrial fibrillation, lasting a few minutes which makes her tired and she goes to lay down. Does not last long. She has some chronic SOB unchanged. She denies CP, LH, and dizziness, syncope. Denies any hospitalizations, ER visits, bleeding issues, or TIA/CVA symptoms. Overall feels well. She has had both COVID vaccine doses.     ROS:  General:  + fatigue, - weight gain or loss  Cardiovascular:  Denies CP, PND, syncope, near syncope, edema or palpitations.  Pulmonary:  Denies GARVEY, cough, or wheezing      Vitals:    02/17/21 1304   BP: 122/66   BP Location: Left arm   Patient Position: Sitting   Pulse: 93   Weight: 69.4 kg (153 lb)   Height: 161.3 cm (63.5\")     Body mass index is 26.68 kg/m².  PE:  General: NAD. A & O x 3   Neck: no JVD, no carotid bruits, no TM  Heart RRR, NL S1, S2, S4 present, no rubs, murmurs  Lungs: CTA, no wheezes, rhonchi, or rales  Abd: soft, non-tender, NL BS  Ext: No musculoskeletal deformities, no edema, cyanosis, or clubbing  Psych: normal mood and affect    Diagnostic Data:    Dual Chamber PPM: normal PM function. 80% AP, 4% . 4% AT/AFL. 6 years on battery.     Procedures    1. Paroxysmal atrial fibrillation (CMS/HCC)    2. Tachycardia-bradycardia syndrome (CMS/HCC)    3. Essential hypertension          Plan:    1) Paroxysmal atrial fibrillation:  - S/p PVA/AFL/AT/FAF 04/2019.  Overall maintaining NSR with 4% AT burden.   - Continue present medications.   2) Anticoagulation: multiple falls/chronic anemia. Have discussed Watchman Device in the past. She wants to pursue. She is not a good candidate for long " term anticoagulation but can tolerate short term.   - CHADSVASc = 5, on Eliquis  3) SSS:  - S/p PPM implant, device with normal function  4) HTN:  - Well controlled on amlodipine, losartan  - Wt loss, exercise, salt reduction    F/up in 6 months    Scribed for Darryl Swenson MD by Carie Marie PA-C. 2/17/2021  13:29 EST     I, Darryl Swenson MD, personally performed the services described in this documentation as scribed by the above named individual in my presence, and it is both accurate and complete.  2/17/2021  13:35 EST

## 2021-02-19 ENCOUNTER — TELEPHONE (OUTPATIENT)
Dept: CARDIOLOGY | Facility: CLINIC | Age: 81
End: 2021-02-19

## 2021-02-19 RX ORDER — METOPROLOL TARTRATE 100 MG/1
150 TABLET ORAL 2 TIMES DAILY
Qty: 270 TABLET | Refills: 3 | Status: SHIPPED | OUTPATIENT
Start: 2021-02-19 | End: 2021-08-25 | Stop reason: ALTCHOICE

## 2021-02-19 NOTE — TELEPHONE ENCOUNTER
Spoke with pt.  Discussed Watchman in detail.  Pt wishes to proceed.  Estimated date 5/2021.  Per pt SDM visit already completed with Dr Flores.  I will call and obtain a copy of the visit.    Dolly Castillo RN

## 2021-02-25 ENCOUNTER — TELEPHONE (OUTPATIENT)
Dept: CARDIOLOGY | Facility: CLINIC | Age: 81
End: 2021-02-25

## 2021-02-25 NOTE — TELEPHONE ENCOUNTER
Pt was scheduled for Watchman implant 5/2021. Phoned today and stated she has decided she does not wish to proceed with the Watchman. Encouraged her to call with any questions.    Dolly Castillo RN

## 2021-03-19 PROCEDURE — 93296 REM INTERROG EVL PM/IDS: CPT | Performed by: INTERNAL MEDICINE

## 2021-03-19 PROCEDURE — 93294 REM INTERROG EVL PM/LDLS PM: CPT | Performed by: INTERNAL MEDICINE

## 2021-04-22 RX ORDER — APIXABAN 5 MG/1
TABLET, FILM COATED ORAL
Qty: 60 TABLET | Refills: 5 | Status: SHIPPED | OUTPATIENT
Start: 2021-04-22 | End: 2021-10-14

## 2021-06-07 RX ORDER — POTASSIUM CHLORIDE 750 MG/1
TABLET, FILM COATED, EXTENDED RELEASE ORAL
Qty: 90 TABLET | Refills: 3 | Status: SHIPPED | OUTPATIENT
Start: 2021-06-07 | End: 2021-01-01

## 2021-08-06 ENCOUNTER — OFFICE VISIT (OUTPATIENT)
Dept: CARDIOLOGY | Facility: CLINIC | Age: 81
End: 2021-08-06

## 2021-08-06 VITALS
WEIGHT: 153 LBS | BODY MASS INDEX: 26.12 KG/M2 | SYSTOLIC BLOOD PRESSURE: 110 MMHG | OXYGEN SATURATION: 95 % | HEART RATE: 84 BPM | HEIGHT: 64 IN | DIASTOLIC BLOOD PRESSURE: 58 MMHG

## 2021-08-06 DIAGNOSIS — I25.119 CORONARY ARTERY DISEASE INVOLVING NATIVE CORONARY ARTERY OF NATIVE HEART WITH ANGINA PECTORIS (HCC): ICD-10-CM

## 2021-08-06 DIAGNOSIS — I27.20 PULMONARY HTN (HCC): ICD-10-CM

## 2021-08-06 DIAGNOSIS — I48.0 PAROXYSMAL ATRIAL FIBRILLATION (HCC): Primary | ICD-10-CM

## 2021-08-06 DIAGNOSIS — I10 ESSENTIAL HYPERTENSION: ICD-10-CM

## 2021-08-06 PROCEDURE — 93000 ELECTROCARDIOGRAM COMPLETE: CPT | Performed by: NURSE PRACTITIONER

## 2021-08-06 PROCEDURE — 99213 OFFICE O/P EST LOW 20 MIN: CPT | Performed by: NURSE PRACTITIONER

## 2021-08-06 NOTE — PROGRESS NOTES
"[facility] Cardiology    Encounter Date: 2021    Patient ID: Kenisha Ruvalcaba is a 81 y.o. female.  : 1940     PCP: Lana Flores MD       Chief Complaint: Coronary artery disease involving native coronary artery of     PROBLEM LIST:  1. Pulmonary hypertension  a. Echo 2019: LVEF normal, fibrocalcific AV, \"mild\" AS/AI, severe TR with RVSP 95mmHg   b. RHC. 2020: Severe mixed pulmonary hypertension. Mild response to inhaled 100% oxygen and significant response to nitrous oxide.  c. On Sildenafil  2. Paroxysmal atrial fibrillation:  a. Coumadin initiation, 2010.  b. Initiation of Tambocor and subsequent external cardioversion, 2010.  c. External cardioversion to sinus rhythm, 2010.  d. CHADS-VASc score equals 4 to 6, on Eliquis  e. Echocardiogram 10/2016: EF >70%, grade II diastolic dysfunction, LA cavity mildly dilated, mild MR, RVSP 45-55 mmHg  f. Recurrent atrial fibrillation despite Flecainide, flecainide discontinued 2018  g. Tikosyn initiation 2018, Metoprolol increased to 100mg BID   h. S/p successful RFA of the pulmonary veins, atrial tachycardia, right atrial isthmus dependent flutter, and RFA of focal AF and long highly fractionated LA signals, 19  3. Sick sinus syndrome:   a. Event recorder 16: 9% AF, 3.3 sec pauses, HR range from 37 bpm - 151 bpm  b. PM implant 17- BTK  4. Mild coronary artery disease on cardiac catheterization, 2013 and C on 16, normal EF  a. Stress MPS 2019: no evidence of ischemia, normal LVEF  5. Carotid artery stenosis, status post left CEA  a. Carotid ultrasound, 2013, with no significant stenosis.  6. Valvular heart disease:  a. Echo 19: Fibrocalcific changes are noted in the aortic valve. There is \"mild\" AS/AI. Severe TR.  7. Multiple falls  8. Hypertension.   9. Dyslipidemia.   10. Vertigo.   11. Anxiety.   12. Surgical history:  a. Tubal " ligation.   b. Left CEA, March 2008.  c. Left Hip replacement     History of Present Illness  Patient presents today for routine follow-up with a history of pulmonary hypertension, paroxysmal atrial fibrillation, coronary artery disease and cardiac risk factors. Since last visit, patient has done well overall. BP controlled. She denies lower extremity edema, chest pain, PND, orthopnea and shortness of breath. She denies any major bleeding or bruising on Eliquis. No TIA or stroke like symptoms. No palpitations.     Allergies   Allergen Reactions   • Beta Adrenergic Blockers Dizziness     DIZZINESS    • Sulfa Antibiotics Rash     RASH          Current Outpatient Medications:   •  acetaminophen (TYLENOL) 325 MG tablet, Take 2 tablets by mouth Every 4 (Four) Hours As Needed for Mild Pain ., Disp: , Rfl:   •  aspirin 81 MG EC tablet, Take 81 mg by mouth Every Night., Disp: , Rfl:   •  cholecalciferol (VITAMIN D3) 1000 UNITS tablet, Take 1,000 Units by mouth Daily., Disp: , Rfl:   •  coenzyme Q10 100 MG capsule, Take 100 mg by mouth 2 (Two) Times a Week., Disp: , Rfl:   •  Eliquis 5 MG tablet tablet, TAKE 1 TABLET BY MOUTH TWICE A DAY, Disp: 60 tablet, Rfl: 5  •  furosemide (LASIX) 20 MG tablet, TAKE 1 TABLET BY MOUTH EVERY DAY AS NEEDED FOR EDEMA, Disp: 90 tablet, Rfl: 3  •  levothyroxine (SYNTHROID, LEVOTHROID) 50 MCG tablet, Take 50 mcg by mouth Every Morning., Disp: , Rfl:   •  losartan (COZAAR) 100 MG tablet, TAKE 1 TABLET BY MOUTH EVERY DAY, Disp: 90 tablet, Rfl: 1  •  meclizine (ANTIVERT) 25 MG tablet, Take 25 mg by mouth 3 (Three) Times a Day As Needed for dizziness., Disp: , Rfl:   •  metoprolol tartrate (LOPRESSOR) 100 MG tablet, Take 1.5 tablets by mouth 2 (Two) Times a Day. Take 1.5 tabs PO BID (Patient taking differently: Take 100 mg by mouth 2 (Two) Times a Day. Take 1.5 tabs PO BID), Disp: 270 tablet, Rfl: 3  •  nitroglycerin (NITROSTAT) 0.4 MG SL tablet, Place 0.4 mg under the tongue Every 5 (Five) Minutes  "As Needed for chest pain. Take no more than 3 doses in 15 minutes., Disp: , Rfl:   •  PARoxetine (PAXIL) 10 MG tablet, Take 10 mg by mouth Every Night., Disp: , Rfl:   •  potassium chloride 10 MEQ CR tablet, TAKE 1 TABLET BY MOUTH EVERY DAY WITH FUROSEMIDE, Disp: 90 tablet, Rfl: 3  •  rosuvastatin (CRESTOR) 20 MG tablet, Take 20 mg by mouth 3 (Three) Times a Week. MON, WEDS, FRI, Disp: , Rfl:   •  sildenafil (REVATIO) 20 MG tablet, Take 1 tablet by mouth 3 (Three) Times a Day., Disp: 90 tablet, Rfl: 3  •  traZODone (DESYREL) 50 MG tablet, TAKE 1-2 TABLETS AT BEDTIME AS NEEDED INSOMNIA, Disp: , Rfl: 5    The following portions of the patient's history were reviewed and updated as appropriate: allergies, current medications, past family history, past medical history, past social history, past surgical history and problem list.    ROS  Review of Systems   Constitution: Negative for chills, fever, fatigue, generalized weakness.   Cardiovascular: Negative for chest pain, dyspnea on exertion, leg swelling, palpitations, orthopnea, and syncope.   Respiratory: Negative for cough, shortness of breath, and wheezing.  HENT: Negative for ear pain, nosebleeds, and tinnitus.  Gastrointestinal: Negative for abdominal pain, constipation, diarrhea, nausea and vomiting.   Genitourinary: No urinary symptoms.  Musculoskeletal: Negative for muscle cramps.  Neurological: Negative for dizziness, headaches, loss of balance, numbness, and symptoms of stroke.  Psychiatric: Normal mental status.     All other systems reviewed and are negative.        Objective:     /58 (BP Location: Right arm, Patient Position: Sitting, Cuff Size: Adult)   Pulse 84   Ht 161.3 cm (63.5\")   Wt 69.4 kg (153 lb)   LMP  (LMP Unknown)   SpO2 95%   BMI 26.68 kg/m²      Physical Exam  Constitutional: Patient appears well-developed and well-nourished.   HENT: HEENT exam unremarkable.   Neck: Neck supple. No JVD present. No carotid bruits.   Cardiovascular: " Normal rate, regular rhythm and normal heart sounds. No murmur heard.   2+ symmetric pulses.   Pulmonary/Chest: Breath sounds normal. Does not exhibit tenderness.   Abdominal: Abdomen benign.   Musculoskeletal: Does not exhibit edema.   Neurological: Neurological exam unremarkable.   Vitals reviewed.    Data Review:      Lab date: 06/12/2020  · FLP: , TG 80, HDL 55, LDL 77  · CMP: Glu 100, BUN 20, Creat 0.86, eGFR 63, Na 143, K 4.2, Cl 103, CO2 29, Ca 9.6, Alk Phos 118, AST 26, ALT 27  · CBC: WBC 4.8, RBC 4.2, HGB 11.8, HCT 34.8, MCV 83, MCH 28,        ECG 12 Lead    Date/Time: 8/6/2021 3:34 PM  Performed by: Silvia Celestin APRN  Authorized by: Silvia Celestin APRN   Comparison: compared with previous ECG from 12/6/2019  Rhythm: sinus rhythm and paced  BPM: 84                 Assessment:      Diagnosis Plan   1. Paroxysmal atrial fibrillation (CMS/HCC)     2. Essential hypertension     3. Coronary artery disease involving native coronary artery of native heart with angina pectoris (CMS/HCC)     4. Pulmonary HTN (CMS/HCC)       Plan:   F/u with EP for PPM checks  Continue ASA, statin for CAD. PCP to follow FLP.   Continue Eliquis and metoprolol for PAF.   Continue Losartan, metoprolol for HTN.   Continue Sildenafil for pulmonary hypertension  Continue current medications.   FU in 6 MO, sooner as needed.      Thank you for allowing us to participate in the care of your patient.     Electronically signed by PATSY Motley, 08/06/21, 3:34 PM EDT.      Please note that portions of this note may have been completed with a voice recognition program. Efforts were made to edit the dictations, but occasionally words are mistranscribed.

## 2021-08-25 ENCOUNTER — OFFICE VISIT (OUTPATIENT)
Dept: CARDIOLOGY | Facility: CLINIC | Age: 81
End: 2021-08-25

## 2021-08-25 VITALS
WEIGHT: 153 LBS | HEART RATE: 88 BPM | DIASTOLIC BLOOD PRESSURE: 82 MMHG | HEIGHT: 64 IN | BODY MASS INDEX: 26.12 KG/M2 | SYSTOLIC BLOOD PRESSURE: 128 MMHG | OXYGEN SATURATION: 92 %

## 2021-08-25 DIAGNOSIS — I48.0 PAROXYSMAL ATRIAL FIBRILLATION (HCC): ICD-10-CM

## 2021-08-25 DIAGNOSIS — I10 ESSENTIAL HYPERTENSION: Primary | ICD-10-CM

## 2021-08-25 DIAGNOSIS — I49.5 TACHYCARDIA-BRADYCARDIA SYNDROME (HCC): ICD-10-CM

## 2021-08-25 PROCEDURE — 99213 OFFICE O/P EST LOW 20 MIN: CPT | Performed by: PHYSICIAN ASSISTANT

## 2021-08-25 RX ORDER — METOPROLOL SUCCINATE 100 MG/1
50 TABLET, EXTENDED RELEASE ORAL DAILY
Qty: 30 TABLET | Refills: 6 | Status: SHIPPED | OUTPATIENT
Start: 2021-08-25

## 2021-08-25 NOTE — PROGRESS NOTES
Kenisha ACOSTA Gaston  1940  [phone]    02/17/2021    [facility] CARDIOLOGY     Referring Provider: No ref. provider found     Lana Flores MD  [address] [facility] [address] [place], [place] [id number]    Chief Complaint   Patient presents with   • Coronary Artery Disease   • Hypertension   • HVD   • PAF       Problem List:   1. Paroxysmal atrial fibrillation:  a. Echocardiogram, 09/10/2010: Left atrium 4.6. Moderate left atrial enlargement. Ejection fraction 55%. Severe right atrial enlargement and 2+ tricuspid regurgitation with RVSP 37 mmHg.  b. Coumadin initiation, 09/07/2010.  c. Initiation of Tambocor and subsequent external cardioversion, 12/03/2010.  d. Echocardiogram, 12/03/2010: Moderate mitral regurgitation, moderate tricuspid regurgitation, normal left ventricular size and function, negative bubble study.   e. External cardioversion to sinus rhythm, December 2010.  f. CHADS-VASc score equals 4 to 6, on Eliquis  g. Echocardiogram 10/2016: EF >70%, grade II diastolic dysfunction, LA cavity mildly dilated, mild MR, RVSP 45-55 mmHg  h. Recurrent atrial fibrillation despite Flecainide, flecainide discontinued 09/2018  i. Tikosyn initiation 09/2018, Metoprolol increased to 100mg BID 1/19  j.  S/p successful RFA of the pulmonary veins, atrial tachycardia, right atrial isthmus dependent flutter, and RFA of focal AF and long highly fractionated LA signals, 4/8/19  2. Sick sinus syndrome:   a. Patient was hospitalized at [facility], 04/26/2016 through 04/28/2016 with a 30-day Event monitor.   b. Event recorder 12/16/16: 9% AF, 3.3 sec pauses, HR range from 37 bpm - 151 bpm  c. PM implant 2/17/17- BTK   3. Mild coronary artery disease on cardiac catheterization, 02/06/2013. And [facility] on 6/6/16, normal EF  4. Carotid artery stenosis, status post left CEA:  a. Carotid ultrasound, March 2013, with no significant stenosis.  5. Valvular heart disease:  a. Echo, 01/19/2013: Left  ventricular ejection fraction 55% to 60%, mild to moderate mitral regurgitation, RVSP 46 mmHg.   6. Hypertension.   7. Dyslipidemia.   8. Vertigo.   9. Anxiety.   10. Surgical history:  a. Tubal ligation.   b. Left CEA, March 2008.      Allergies  Allergies   Allergen Reactions   • Beta Adrenergic Blockers Dizziness     DIZZINESS    • Sulfa Antibiotics Rash     RASH        Current Medications    Current Outpatient Medications:   •  acetaminophen (TYLENOL) 325 MG tablet, Take 2 tablets by mouth Every 4 (Four) Hours As Needed for Mild Pain ., Disp: , Rfl:   •  aspirin 81 MG EC tablet, Take 81 mg by mouth Every Night., Disp: , Rfl:   •  cholecalciferol (VITAMIN D3) 1000 UNITS tablet, Take 1,000 Units by mouth Daily., Disp: , Rfl:   •  coenzyme Q10 100 MG capsule, Take 100 mg by mouth 2 (Two) Times a Week., Disp: , Rfl:   •  Eliquis 5 MG tablet tablet, TAKE 1 TABLET BY MOUTH TWICE A DAY, Disp: 60 tablet, Rfl: 5  •  furosemide (LASIX) 20 MG tablet, TAKE 1 TABLET BY MOUTH EVERY DAY AS NEEDED FOR EDEMA, Disp: 90 tablet, Rfl: 3  •  levothyroxine (SYNTHROID, LEVOTHROID) 50 MCG tablet, Take 50 mcg by mouth Every Morning., Disp: , Rfl:   •  losartan (COZAAR) 100 MG tablet, TAKE 1 TABLET BY MOUTH EVERY DAY, Disp: 90 tablet, Rfl: 1  •  meclizine (ANTIVERT) 25 MG tablet, Take 25 mg by mouth 3 (Three) Times a Day As Needed for dizziness., Disp: , Rfl:   •  metoprolol tartrate (LOPRESSOR) 100 MG tablet, Take 1.5 tablets by mouth 2 (Two) Times a Day. Take 1.5 tabs PO BID (Patient taking differently: Take 100 mg by mouth 2 (Two) Times a Day. Take 1.5 tabs PO BID), Disp: 270 tablet, Rfl: 3  •  nitroglycerin (NITROSTAT) 0.4 MG SL tablet, Place 0.4 mg under the tongue Every 5 (Five) Minutes As Needed for chest pain. Take no more than 3 doses in 15 minutes., Disp: , Rfl:   •  PARoxetine (PAXIL) 10 MG tablet, Take 10 mg by mouth Every Night., Disp: , Rfl:   •  potassium chloride 10 MEQ CR tablet, TAKE 1 TABLET BY MOUTH EVERY DAY WITH  "FUROSEMIDE, Disp: 90 tablet, Rfl: 3  •  rosuvastatin (CRESTOR) 20 MG tablet, Take 20 mg by mouth 3 (Three) Times a Week. MON, WEDS, FRI, Disp: , Rfl:   •  sildenafil (REVATIO) 20 MG tablet, Take 1 tablet by mouth 3 (Three) Times a Day., Disp: 90 tablet, Rfl: 3  •  traZODone (DESYREL) 50 MG tablet, TAKE 1-2 TABLETS AT BEDTIME AS NEEDED INSOMNIA, Disp: , Rfl: 5    History of Present Illness     Pleasant 81-year-old female presents for follow up of atrial fibrillation, atrial flutter with previous ablation, SSS with PM check, and HTN.  She states since last office that she has been feeling fatigued more so than usual.  She is having difficult time taking all of her medications.  States her blood pressure well controlled.  She has not feeling any episodes of A. fib dizziness near syncope.  She denies orthopnea or worsening peripheral edema.  She does feel like fatigue has increased since she started Revatio.      ROS:  General:  + fatigue, - weight gain or loss  Cardiovascular:  Denies CP, PND, syncope, near syncope, edema or palpitations.  Pulmonary:  + GARVEY, cough, or wheezing      Vitals:    08/25/21 1303   BP: 128/82   Pulse: 88   SpO2: 92%   Weight: 69.4 kg (153 lb)   Height: 161.3 cm (63.5\")     Body mass index is 26.67 kg/m².  PE:  General: NAD. A & O x 3   Neck: no JVD, no carotid bruits, no TM  Heart RRR, NL S1, S2, S4 present, no rubs, murmurs  Lungs: CTA, no wheezes, rhonchi, or rales  Abd: soft, non-tender, NL BS  Ext: No musculoskeletal deformities, no edema, cyanosis, or clubbing  Psych: normal mood and affect    Diagnostic Data:    Dual Chamber PPM: normal PM function. Biotronik DDD. Rate 70. A paced 83%. RV Paced 3%.  Noraml P waves and R waves. Normal threshold and impedances. 4% Afib. Battery voltage 5 years.     Procedures    1. Essential hypertension    2. Paroxysmal atrial fibrillation (CMS/HCC)    3. Tachycardia-bradycardia syndrome (CMS/HCC)          Plan:    1) Paroxysmal atrial " fibrillation:  - S/p PVA/AFL/AT/FAF 04/2019.  4% afib, rate controlled.    - Continue present medications.   2) Anticoagulation: multiple falls/chronic anemia. Have discussed Watchman Device in the past. She wants to pursue. She is not a good candidate for long term anticoagulation but can tolerate short term.   - CHADSVASc = 5, on Eliquis  3) SSS:  - S/p PPM implant, device with normal function  4) HTN:  - Well controlled on amlodipine, losartan  -   5)Pulmonary HTN: Revatio started 1/2020 with Dr. Sosa    Change lopressor to Toprol XL 100mg daily  Follow up Echo regarding AS, Pulmonary HTN-Followed by Dr. Sosa  Labs from PCP regarding Anemia. She declines Watchman  F/up in 6 months  Electronically signed by JOSI Morataya, 08/25/21, 1:38 PM EDT.

## 2021-08-26 RX ORDER — LOSARTAN POTASSIUM 100 MG/1
TABLET ORAL
Qty: 90 TABLET | Refills: 3 | Status: SHIPPED | OUTPATIENT
Start: 2021-08-26 | End: 2021-10-22 | Stop reason: SDUPTHER

## 2021-09-17 PROCEDURE — 93294 REM INTERROG EVL PM/LDLS PM: CPT | Performed by: INTERNAL MEDICINE

## 2021-09-17 PROCEDURE — 93296 REM INTERROG EVL PM/IDS: CPT | Performed by: INTERNAL MEDICINE

## 2021-09-28 ENCOUNTER — HOSPITAL ENCOUNTER (OUTPATIENT)
Dept: CARDIOLOGY | Facility: HOSPITAL | Age: 81
Discharge: HOME | End: 2021-09-28
Admitting: PHYSICIAN ASSISTANT

## 2021-09-28 VITALS — HEIGHT: 63 IN | WEIGHT: 153 LBS | BODY MASS INDEX: 27.11 KG/M2

## 2021-09-28 DIAGNOSIS — I48.0 PAROXYSMAL ATRIAL FIBRILLATION (HCC): ICD-10-CM

## 2021-09-28 DIAGNOSIS — I49.5 TACHYCARDIA-BRADYCARDIA SYNDROME (HCC): ICD-10-CM

## 2021-09-28 DIAGNOSIS — I10 ESSENTIAL HYPERTENSION: ICD-10-CM

## 2021-09-28 PROCEDURE — 93306 TTE W/DOPPLER COMPLETE: CPT

## 2021-09-28 PROCEDURE — 93306 TTE W/DOPPLER COMPLETE: CPT | Performed by: INTERNAL MEDICINE

## 2021-09-29 LAB
ASCENDING AORTA: 3.5 CM
AV VENA CONTRACTA: 0.49 CM
BH CV ECHO MEAS - AI DEC SLOPE: 342 CM/SEC^2
BH CV ECHO MEAS - AI MAX PG: 65.5 MMHG
BH CV ECHO MEAS - AI MAX VEL: 404.5 CM/SEC
BH CV ECHO MEAS - AI P1/2T: 346.4 MSEC
BH CV ECHO MEAS - AO MAX PG (FULL): 13.8 MMHG
BH CV ECHO MEAS - AO MAX PG: 18 MMHG
BH CV ECHO MEAS - AO MEAN PG (FULL): 7 MMHG
BH CV ECHO MEAS - AO MEAN PG: 9 MMHG
BH CV ECHO MEAS - AO ROOT AREA (BSA CORRECTED): 1.5
BH CV ECHO MEAS - AO ROOT AREA: 5.3 CM^2
BH CV ECHO MEAS - AO ROOT DIAM: 2.6 CM
BH CV ECHO MEAS - AO V2 MAX: 212 CM/SEC
BH CV ECHO MEAS - AO V2 MEAN: 138 CM/SEC
BH CV ECHO MEAS - AO V2 VTI: 43.1 CM
BH CV ECHO MEAS - ASC AORTA: 3.5 CM
BH CV ECHO MEAS - AVA(I,A): 1.3 CM^2
BH CV ECHO MEAS - AVA(I,D): 1.3 CM^2
BH CV ECHO MEAS - AVA(V,A): 1.2 CM^2
BH CV ECHO MEAS - AVA(V,D): 1.2 CM^2
BH CV ECHO MEAS - BSA(HAYCOCK): 1.8 M^2
BH CV ECHO MEAS - BSA: 1.7 M^2
BH CV ECHO MEAS - BZI_BMI: 27.1 KILOGRAMS/M^2
BH CV ECHO MEAS - BZI_METRIC_HEIGHT: 160 CM
BH CV ECHO MEAS - BZI_METRIC_WEIGHT: 69.4 KG
BH CV ECHO MEAS - EDV(CUBED): 74.1 ML
BH CV ECHO MEAS - EDV(MOD-SP2): 36 ML
BH CV ECHO MEAS - EDV(MOD-SP4): 29 ML
BH CV ECHO MEAS - EDV(TEICH): 78.6 ML
BH CV ECHO MEAS - EF(CUBED): 77.1 %
BH CV ECHO MEAS - EF(MOD-BP): 69 %
BH CV ECHO MEAS - EF(MOD-SP2): 69.4 %
BH CV ECHO MEAS - EF(MOD-SP4): 65.5 %
BH CV ECHO MEAS - EF(TEICH): 69.6 %
BH CV ECHO MEAS - ESV(CUBED): 17 ML
BH CV ECHO MEAS - ESV(MOD-SP2): 11 ML
BH CV ECHO MEAS - ESV(MOD-SP4): 10 ML
BH CV ECHO MEAS - ESV(TEICH): 23.9 ML
BH CV ECHO MEAS - FS: 38.8 %
BH CV ECHO MEAS - IVS/LVPW: 1
BH CV ECHO MEAS - IVSD: 0.85 CM
BH CV ECHO MEAS - LA DIMENSION: 3 CM
BH CV ECHO MEAS - LA/AO: 1.2
BH CV ECHO MEAS - LAD MAJOR: 5.6 CM
BH CV ECHO MEAS - LAT PEAK E' VEL: 7 CM/SEC
BH CV ECHO MEAS - LATERAL E/E' RATIO: 19.7
BH CV ECHO MEAS - LV DIASTOLIC VOL/BSA (35-75): 16.8 ML/M^2
BH CV ECHO MEAS - LV IVRT: 0.07 SEC
BH CV ECHO MEAS - LV MASS(C)D: 109.1 GRAMS
BH CV ECHO MEAS - LV MASS(C)DI: 63.2 GRAMS/M^2
BH CV ECHO MEAS - LV MAX PG: 4.2 MMHG
BH CV ECHO MEAS - LV MEAN PG: 2 MMHG
BH CV ECHO MEAS - LV SYSTOLIC VOL/BSA (12-30): 5.8 ML/M^2
BH CV ECHO MEAS - LV V1 MAX: 103 CM/SEC
BH CV ECHO MEAS - LV V1 MEAN: 65.5 CM/SEC
BH CV ECHO MEAS - LV V1 VTI: 21.6 CM
BH CV ECHO MEAS - LVIDD: 4.2 CM
BH CV ECHO MEAS - LVIDS: 2.6 CM
BH CV ECHO MEAS - LVLD AP2: 6.6 CM
BH CV ECHO MEAS - LVLD AP4: 5.4 CM
BH CV ECHO MEAS - LVLS AP2: 5.2 CM
BH CV ECHO MEAS - LVLS AP4: 4.8 CM
BH CV ECHO MEAS - LVOT AREA (M): 2.5 CM^2
BH CV ECHO MEAS - LVOT AREA: 2.5 CM^2
BH CV ECHO MEAS - LVOT DIAM: 1.8 CM
BH CV ECHO MEAS - LVPWD: 0.85 CM
BH CV ECHO MEAS - MED PEAK E' VEL: 7.8 CM/SEC
BH CV ECHO MEAS - MEDIAL E/E' RATIO: 17.5
BH CV ECHO MEAS - MV A MAX VEL: 48.8 CM/SEC
BH CV ECHO MEAS - MV DEC SLOPE: 500 CM/SEC^2
BH CV ECHO MEAS - MV DEC TIME: 0.24 SEC
BH CV ECHO MEAS - MV E MAX VEL: 137 CM/SEC
BH CV ECHO MEAS - MV E/A: 2.8
BH CV ECHO MEAS - MV MAX PG: 8.8 MMHG
BH CV ECHO MEAS - MV MEAN PG: 3 MMHG
BH CV ECHO MEAS - MV P1/2T MAX VEL: 148 CM/SEC
BH CV ECHO MEAS - MV P1/2T: 86.7 MSEC
BH CV ECHO MEAS - MV V2 MAX: 148 CM/SEC
BH CV ECHO MEAS - MV V2 MEAN: 80.1 CM/SEC
BH CV ECHO MEAS - MV V2 VTI: 38 CM
BH CV ECHO MEAS - MVA P1/2T LCG: 1.5 CM^2
BH CV ECHO MEAS - MVA(P1/2T): 2.5 CM^2
BH CV ECHO MEAS - MVA(VTI): 1.4 CM^2
BH CV ECHO MEAS - PA ACC SLOPE: 353 CM/SEC^2
BH CV ECHO MEAS - PA ACC TIME: 0.1 SEC
BH CV ECHO MEAS - PA MAX PG: 5 MMHG
BH CV ECHO MEAS - PA PR(ACCEL): 33.1 MMHG
BH CV ECHO MEAS - PA V2 MAX: 112 CM/SEC
BH CV ECHO MEAS - PI END-D VEL: 102 CM/SEC
BH CV ECHO MEAS - RAP SYSTOLE: 8 MMHG
BH CV ECHO MEAS - RVSP: 91.9 MMHG
BH CV ECHO MEAS - SI(AO): 132.6 ML/M^2
BH CV ECHO MEAS - SI(CUBED): 33.1 ML/M^2
BH CV ECHO MEAS - SI(LVOT): 31.9 ML/M^2
BH CV ECHO MEAS - SI(MOD-SP2): 14.5 ML/M^2
BH CV ECHO MEAS - SI(MOD-SP4): 11 ML/M^2
BH CV ECHO MEAS - SI(TEICH): 31.7 ML/M^2
BH CV ECHO MEAS - SV(AO): 228.8 ML
BH CV ECHO MEAS - SV(CUBED): 57.1 ML
BH CV ECHO MEAS - SV(LVOT): 55 ML
BH CV ECHO MEAS - SV(MOD-SP2): 25 ML
BH CV ECHO MEAS - SV(MOD-SP4): 19 ML
BH CV ECHO MEAS - SV(TEICH): 54.7 ML
BH CV ECHO MEAS - TR MAX PG: 83.9 MMHG
BH CV ECHO MEAS - TR MAX VEL: 458 CM/SEC
BH CV ECHO MEASUREMENTS AVERAGE E/E' RATIO: 18.51
BH CV VAS BP RIGHT ARM: NORMAL MMHG
BH CV XLRA - RV BASE: 3 CM
BH CV XLRA - RV LENGTH: 5.3 CM
BH CV XLRA - RV MID: 2.7 CM
BH CV XLRA - TDI S': 9.84 CM/SEC
IVRT: 69 MSEC
LEFT ATRIUM VOLUME INDEX: 26.1 ML/M^2
LEFT ATRIUM VOLUME: 45 ML
LV EF 2D ECHO EST: 65 %
MAXIMAL PREDICTED HEART RATE: 138 BPM
STRESS TARGET HR: 117 BPM

## 2021-10-04 ENCOUNTER — TELEPHONE (OUTPATIENT)
Dept: CARDIOLOGY | Facility: CLINIC | Age: 81
End: 2021-10-04

## 2021-10-04 RX ORDER — SILDENAFIL CITRATE 20 MG/1
TABLET ORAL
Qty: 90 TABLET | Refills: 3 | Status: SHIPPED | OUTPATIENT
Start: 2021-10-04 | End: 2021-01-01 | Stop reason: ALTCHOICE

## 2021-10-05 NOTE — TELEPHONE ENCOUNTER
Patient called back and would like to know the results of her echo. I explained that Will is out for the week and I could ask another PA to look at it for her.

## 2021-10-06 ENCOUNTER — TELEPHONE (OUTPATIENT)
Dept: CARDIOLOGY | Facility: CLINIC | Age: 81
End: 2021-10-06

## 2021-10-14 RX ORDER — APIXABAN 5 MG/1
TABLET, FILM COATED ORAL
Qty: 60 TABLET | Refills: 5 | Status: SHIPPED | OUTPATIENT
Start: 2021-10-14 | End: 2021-01-01 | Stop reason: HOSPADM

## 2021-10-21 NOTE — PROGRESS NOTES
"[facility] Cardiology    Encounter Date: 10/22/2021    Patient ID: Kenihsa Ruvalcaba is a 81 y.o. female.  : 1940     PCP: Lana Flores MD       Chief Complaint: Atrial Fibrillation      PROBLEM LIST:  1. Pulmonary hypertension  a. Echo 2019: LVEF normal, fibrocalcific AV, \"mild\" AS/AI, severe TR with RVSP 95mmHg   b. RHC. 2020: Severe mixed pulmonary hypertension. Mild response to inhaled 100% oxygen and significant response to nitrous oxide.  c. Echo, 2021: EF 65%. Aortic sclerosis without stenosis. Moderate AI. Mild MR. Moderate to severe TR with severe PH. RVSP >55 mmHg. Mild PI.  d. On sildenafil  2. Paroxysmal atrial fibrillation:  a. Coumadin initiation, 2010.  b. Initiation of Tambocor and subsequent external cardioversion, 2010.  c. External cardioversion to sinus rhythm, 2010.  d. CHADS-VASc score equals 4 to 6, on Eliquis  e. Echocardiogram 10/2016: EF >70%, grade II diastolic dysfunction, LA cavity mildly dilated, mild MR, RVSP 45-55 mmHg  f. Recurrent atrial fibrillation despite Flecainide, flecainide discontinued 2018  g. Tikosyn initiation 2018, Metoprolol increased to 100mg BID   h. S/p successful RFA of the pulmonary veins, atrial tachycardia, right atrial isthmus dependent flutter, and RFA of focal AF and long highly fractionated LA signals, 19  3. Sick sinus syndrome:   a. Event recorder 16: 9% AF, 3.3 sec pauses, HR range from 37 bpm - 151 bpm  b. PM implant 17- BTK  4. Mild coronary artery disease on cardiac catheterization, 2013 and C on 16, normal EF  a. Stress MPS 2019: no evidence of ischemia, normal LVEF  5. Carotid artery stenosis, status post left CEA  a. Carotid ultrasound, 2013, with no significant stenosis.  6. Valvular heart disease:  a. Echo 19: Fibrocalcific changes are noted in the aortic valve. There is \"mild\" AS/AI. Severe TR.  7. Multiple " falls  8. Hypertension.   9. Dyslipidemia.   10. Vertigo.   11. Anxiety.   12. Surgical history:  a. Tubal ligation.   b. Left CEA, March 2008.  c. Left Hip replacement    History of Present Illness  Patient presents earlier than anticipated for 2-month follow-up with a history of pulmonary hypertension, atrial fibrillation, sick sinus syndrome, CAD, and cardiac risk factors. Since last visit, she has not been feeling well overall. She reports since her metoprolol was decreased to 50 mg daily she has been experiencing dyspnea on exertion and has not been sleeping well. She notes she experiences chest pressure in her bilateral chest which improves with leaning forward and she will often res her head on a table to alleviate this pain. Patient denies palpitations, edema, dizziness, and syncope. She does complain of epigastric abdominal pain.    Allergies   Allergen Reactions   • Beta Adrenergic Blockers Dizziness     DIZZINESS    • Sulfa Antibiotics Rash     RASH          Current Outpatient Medications:   •  acetaminophen (TYLENOL) 325 MG tablet, Take 2 tablets by mouth Every 4 (Four) Hours As Needed for Mild Pain ., Disp: , Rfl:   •  aspirin 81 MG EC tablet, Take 81 mg by mouth Every Night., Disp: , Rfl:   •  cholecalciferol (VITAMIN D3) 1000 UNITS tablet, Take 1,000 Units by mouth Daily., Disp: , Rfl:   •  coenzyme Q10 100 MG capsule, Take 100 mg by mouth 2 (Two) Times a Week., Disp: , Rfl:   •  Eliquis 5 MG tablet tablet, TAKE 1 TABLET BY MOUTH TWICE A DAY, Disp: 60 tablet, Rfl: 5  •  furosemide (LASIX) 20 MG tablet, TAKE 1 TABLET BY MOUTH EVERY DAY AS NEEDED FOR EDEMA, Disp: 90 tablet, Rfl: 3  •  levothyroxine (SYNTHROID, LEVOTHROID) 50 MCG tablet, Take 50 mcg by mouth Every Morning., Disp: , Rfl:   •  losartan (COZAAR) 100 MG tablet, TAKE 1 TABLET BY MOUTH EVERY DAY, Disp: 90 tablet, Rfl: 3  •  meclizine (ANTIVERT) 25 MG tablet, Take 25 mg by mouth 3 (Three) Times a Day As Needed for dizziness., Disp: , Rfl:   •   "metoprolol succinate XL (TOPROL-XL) 100 MG 24 hr tablet, Take 0.5 tablets by mouth Daily., Disp: 30 tablet, Rfl: 6  •  nitroglycerin (NITROSTAT) 0.4 MG SL tablet, Place 0.4 mg under the tongue Every 5 (Five) Minutes As Needed for chest pain. Take no more than 3 doses in 15 minutes., Disp: , Rfl:   •  PARoxetine (PAXIL) 10 MG tablet, Take 10 mg by mouth Every Night., Disp: , Rfl:   •  potassium chloride 10 MEQ CR tablet, TAKE 1 TABLET BY MOUTH EVERY DAY WITH FUROSEMIDE, Disp: 90 tablet, Rfl: 3  •  rosuvastatin (CRESTOR) 20 MG tablet, Take 20 mg by mouth 3 (Three) Times a Week. MON, WEDS, FRI, Disp: , Rfl:   •  sildenafil (REVATIO) 20 MG tablet, TAKE ONE TABLET BY MOUTH THREE TIMES A DAY, Disp: 90 tablet, Rfl: 3  •  traZODone (DESYREL) 50 MG tablet, TAKE 1-2 TABLETS AT BEDTIME AS NEEDED INSOMNIA, Disp: , Rfl: 5    The following portions of the patient's history were reviewed and updated as appropriate: allergies, current medications, past family history, past medical history, past social history, past surgical history and problem list.    ROS  Review of Systems   Constitution: Negative for chills, fever, fatigue, generalized weakness.   Cardiovascular: Negative for chest pain, dyspnea on exertion, leg swelling, palpitations, orthopnea, and syncope.   Respiratory: Negative for cough, shortness of breath, and wheezing.  HENT: Negative for ear pain, nosebleeds, and tinnitus.  Gastrointestinal: Negative for abdominal pain, constipation, diarrhea, nausea and vomiting.   Genitourinary: No urinary symptoms.  Musculoskeletal: Negative for muscle cramps.  Neurological: Negative for dizziness, headaches, loss of balance, numbness, and symptoms of stroke.  Psychiatric: Normal mental status.     All other systems reviewed and are negative.        Objective:     /60 (BP Location: Right arm, Patient Position: Sitting)   Pulse 80   Ht 161.3 cm (63.5\")   Wt 66.7 kg (147 lb)   LMP  (LMP Unknown)   SpO2 94%   BMI 25.63 " kg/m²      Physical Exam  Constitutional: Patient appears well-developed and well-nourished.   HENT: HEENT exam unremarkable.   Neck: Neck supple. No JVD present. No carotid bruits.   Cardiovascular: Normal rate, regular rhythm and normal heart sounds. 2/6 PREET RUSB  2+ symmetric pulses.   Pulmonary/Chest: Breath sounds normal. Does not exhibit tenderness.   Abdominal: Abdomen benign.   Musculoskeletal: Does not exhibit edema.   Neurological: Neurological exam unremarkable.   Vitals reviewed.    Data Review:     No recent laboratory studies available for review today.    Procedures       Assessment:      Diagnosis Plan   1. Paroxysmal atrial fibrillation (HCC)  No documented recurrence and asymptomatic. Increase metoprolol to 100 mg daily.   2. Pulmonary HTN (HCC)  Acceptable stable echocardiogram, September 2021. Continue sildenafil.    3. Coronary artery disease involving native coronary artery of native heart with angina pectoris (HCC)  No symptoms of angina pectoris or CHF on current activity schedule   4. Essential hypertension  Alter losartan to 25 mg nightly and increase metoprolol to 100 mg daily.   5. Hyperlipidemia LDL goal <70  No data to review.      Plan:   Alter losartan to 25 mg nightly.  Increase metoprolol to 100 mg daily.  We will refer her to pulmonology due to positional chest pressure and dyspnea.  Continue current medications.   FU in 3 MO, sooner as needed.  Thank you for allowing us to participate in the care of your patient.     I, William Laurent, attest that this documentation has been prepared under the direction and in the presence of Sang Sosa MD 10/22/2021    ISang MD, personally performed the services described in this documentation as scribed by the above named individual in my presence, and it is both accurate and complete.  10/22/2021  13:08 EDT        Please note that portions of this note may have been completed with a voice recognition program. Efforts were made to  edit the dictations, but occasionally words are mistranscribed.

## 2021-10-22 ENCOUNTER — OFFICE VISIT (OUTPATIENT)
Dept: CARDIOLOGY | Facility: CLINIC | Age: 81
End: 2021-10-22

## 2021-10-22 VITALS
HEART RATE: 80 BPM | DIASTOLIC BLOOD PRESSURE: 60 MMHG | BODY MASS INDEX: 25.1 KG/M2 | OXYGEN SATURATION: 94 % | HEIGHT: 64 IN | SYSTOLIC BLOOD PRESSURE: 102 MMHG | WEIGHT: 147 LBS

## 2021-10-22 DIAGNOSIS — I25.119 CORONARY ARTERY DISEASE INVOLVING NATIVE CORONARY ARTERY OF NATIVE HEART WITH ANGINA PECTORIS (HCC): ICD-10-CM

## 2021-10-22 DIAGNOSIS — I10 ESSENTIAL HYPERTENSION: ICD-10-CM

## 2021-10-22 DIAGNOSIS — I27.20 PULMONARY HTN (HCC): ICD-10-CM

## 2021-10-22 DIAGNOSIS — E78.5 HYPERLIPIDEMIA LDL GOAL <70: ICD-10-CM

## 2021-10-22 DIAGNOSIS — R06.02 SHORTNESS OF BREATH: ICD-10-CM

## 2021-10-22 DIAGNOSIS — I48.0 PAROXYSMAL ATRIAL FIBRILLATION (HCC): Primary | ICD-10-CM

## 2021-10-22 PROCEDURE — 99214 OFFICE O/P EST MOD 30 MIN: CPT | Performed by: INTERNAL MEDICINE

## 2021-10-22 RX ORDER — LOSARTAN POTASSIUM 25 MG/1
25 TABLET ORAL NIGHTLY
Qty: 90 TABLET | Refills: 3 | Status: SHIPPED | OUTPATIENT
Start: 2021-10-22 | End: 2021-01-01 | Stop reason: SDUPTHER

## 2021-10-27 ENCOUNTER — PREP FOR SURGERY (OUTPATIENT)
Dept: OTHER | Facility: HOSPITAL | Age: 81
End: 2021-10-27

## 2021-10-27 ENCOUNTER — OFFICE VISIT (OUTPATIENT)
Dept: PULMONOLOGY | Facility: CLINIC | Age: 81
End: 2021-10-27

## 2021-10-27 VITALS
HEIGHT: 64 IN | HEART RATE: 88 BPM | WEIGHT: 145 LBS | SYSTOLIC BLOOD PRESSURE: 120 MMHG | TEMPERATURE: 97 F | DIASTOLIC BLOOD PRESSURE: 68 MMHG | BODY MASS INDEX: 24.75 KG/M2 | OXYGEN SATURATION: 97 %

## 2021-10-27 DIAGNOSIS — J90 PLEURAL EFFUSION: Primary | ICD-10-CM

## 2021-10-27 DIAGNOSIS — I27.20 PULMONARY HTN (HCC): Primary | ICD-10-CM

## 2021-10-27 DIAGNOSIS — Z01.812 BLOOD TESTS PRIOR TO TREATMENT OR PROCEDURE: Primary | ICD-10-CM

## 2021-10-27 PROCEDURE — 99204 OFFICE O/P NEW MOD 45 MIN: CPT | Performed by: INTERNAL MEDICINE

## 2021-10-27 NOTE — PROGRESS NOTES
CC:    Shortness of breath    HPI:    82 y/o WF w/ h/o lifetime non-smoking, allergic rhinitis, GERD, Afib / flutter, prior ablation, PPM, HFpEF EF 65%, VHD - moderate AI / mild MR / moderate to severe TR, Group 2 Pulmonary HTN RHC 1/30/20:  RA 12 Mean PA 52, PCWP 36, CO 3.37, PVR 4.74 RIBEIRO.  Mean PA dropped to 37 w/ Leticia.  She was started on Revatio and did notice a subjective improvement.  Patient is referred by Dr. Sosa for the evaluation of increased shortness of breath.  She has had frequent falls.  Last year fell and likely broke some left sided ribs.  Mainly has GARVEY with minimal activity and chest pressure.  No PND / Orthopnea.  Trace LE edema.  No exertional light headedness or dizziness.    PMH:    Past Medical History:   Diagnosis Date   • Anesthesia complication     HARD TO WAKE, Hallucinations, Agitation    • Anxiety    • Broken arm 08/30/2019    left upper arm   • Broken nose 08/30/2019   • Chronic hip pain, left 1/7/2019   • Dyslipidemia    • Fall 08/30/2019   • History of cardioversion 12/03/2010    initiation of Tambocor and subsequent external cardioversion.   • ÁNGELA (obstructive sleep apnea)    • Paroxysmal atrial fibrillation (HCC)    • Vertigo    • Wears glasses      PSH:    Past Surgical History:   Procedure Laterality Date   • BREAST BIOPSY Left     MARKER IN PLACE   • CARDIAC CATHETERIZATION Left 6/16/2016    Procedure: Cardiac catheterization;  Surgeon: Chuck Clark MD;  Location:  [facility];  Service:    • CARDIAC CATHETERIZATION N/A 1/30/2020    Procedure: Right Heart Cath;  Surgeon: Sang Sosa MD;  Location:  [facility];  Service: Cardiology   • CARDIAC ELECTROPHYSIOLOGY PROCEDURE N/A 2/17/2017    Procedure: Pacemaker DC new;  Surgeon: Darryl Swenson MD;  Location:  [facility] EP INVASIVE LOCATION;  Service:    • CARDIAC ELECTROPHYSIOLOGY PROCEDURE N/A 4/8/2019    Procedure: Ablation atrial fibrillation, hold Tikosyn for 5 days stay on  metoprolol.;  Surgeon: Darryl Swenson MD;  Location: [facility] EP INVASIVE LOCATION;  Service: Cardiovascular   • CAROTID ENDARTERECTOMY Left    • CATARACT EXTRACTION Bilateral    • COLONOSCOPY      every 5-10 years. does not plan to have anymore   • PACEMAKER IMPLANTATION     • TOTAL HIP ARTHROPLASTY Left 1/30/2019    Procedure: TOTAL HIP ARTHROPLASTY LEFT;  Surgeon: Freddie Vale MD;  Location: [facility] OR;  Service: Orthopedics   • TUBAL ABDOMINAL LIGATION       FH:    Family History   Problem Relation Age of Onset   • Alzheimer's disease Mother    • Cancer Mother    • Aortic aneurysm Father    • Heart valve disorder Sister      SH:    Social History     Socioeconomic History   • Marital status:    Tobacco Use   • Smoking status: Never Smoker   • Smokeless tobacco: Never Used   Vaping Use   • Vaping Use: Never used   Substance and Sexual Activity   • Alcohol use: Yes     Alcohol/week: 1.0 standard drink     Types: 1 Glasses of wine per week     Comment: occas   • Drug use: No   • Sexual activity: Defer     ALLERGIES:    Allergies   Allergen Reactions   • Beta Adrenergic Blockers Dizziness     DIZZINESS    • Sulfa Antibiotics Rash     RASH      MEDICATIONS:      Current Outpatient Medications:   •  acetaminophen (TYLENOL) 325 MG tablet, Take 2 tablets by mouth Every 4 (Four) Hours As Needed for Mild Pain ., Disp: , Rfl:   •  aspirin 81 MG EC tablet, Take 81 mg by mouth Every Night., Disp: , Rfl:   •  cholecalciferol (VITAMIN D3) 1000 UNITS tablet, Take 1,000 Units by mouth Daily., Disp: , Rfl:   •  coenzyme Q10 100 MG capsule, Take 100 mg by mouth 2 (Two) Times a Week., Disp: , Rfl:   •  Eliquis 5 MG tablet tablet, TAKE 1 TABLET BY MOUTH TWICE A DAY, Disp: 60 tablet, Rfl: 5  •  furosemide (LASIX) 20 MG tablet, TAKE 1 TABLET BY MOUTH EVERY DAY AS NEEDED FOR EDEMA, Disp: 90 tablet, Rfl: 3  •  levothyroxine (SYNTHROID, LEVOTHROID) 50 MCG tablet, Take 50 mcg by mouth Every Morning., Disp: , Rfl:   •   losartan (COZAAR) 25 MG tablet, Take 1 tablet by mouth Every Night., Disp: 90 tablet, Rfl: 3  •  meclizine (ANTIVERT) 25 MG tablet, Take 25 mg by mouth 3 (Three) Times a Day As Needed for dizziness., Disp: , Rfl:   •  metoprolol succinate XL (TOPROL-XL) 100 MG 24 hr tablet, Take 0.5 tablets by mouth Daily., Disp: 30 tablet, Rfl: 6  •  nitroglycerin (NITROSTAT) 0.4 MG SL tablet, Place 0.4 mg under the tongue Every 5 (Five) Minutes As Needed for chest pain. Take no more than 3 doses in 15 minutes., Disp: , Rfl:   •  PARoxetine (PAXIL) 10 MG tablet, Take 10 mg by mouth Every Night., Disp: , Rfl:   •  potassium chloride 10 MEQ CR tablet, TAKE 1 TABLET BY MOUTH EVERY DAY WITH FUROSEMIDE, Disp: 90 tablet, Rfl: 3  •  rosuvastatin (CRESTOR) 20 MG tablet, Take 20 mg by mouth 3 (Three) Times a Week. MON, WEDS, FRI, Disp: , Rfl:   •  sildenafil (REVATIO) 20 MG tablet, TAKE ONE TABLET BY MOUTH THREE TIMES A DAY, Disp: 90 tablet, Rfl: 3  •  traZODone (DESYREL) 50 MG tablet, TAKE 1-2 TABLETS AT BEDTIME AS NEEDED INSOMNIA, Disp: , Rfl: 5  ROS:  Per HPI, otherwise all systems reviewed and negative.    DIAGNOSTIC DATA (Reviewed and interpreted by me unless otherwise specified):    CXR 10/27/21 - moderate to large left sided pleural effusion which is new from last CXR 6/3/20    Vitals:    10/27/21 1041   BP: 120/68   Pulse: 88   Temp: 97 °F (36.1 °C)   SpO2: 97%       Physical Exam   Constitutional: Oriented to person, place, and time. Appears well-developed and well-nourished.   Head: Normocephalic and atraumatic.   Nose: Nose normal.   Mouth/Throat: Oropharynx is clear and moist.   Eyes: Conjunctivae are normal.  Pupils normal.  Neck: No tracheal deviation present.   Cardiovascular: Normal rate, regular rhythm, normal heart sounds and intact distal pulses.  Exam reveals no gallop and no friction rub.  No thrill.  No JVD.  No edema.  No murmur heard.  Pulmonary/Chest: Effort normal and breath sounds diminished left base.  No  tenderness to palpation.  No clubbing.   Abdominal: Soft. Bowel sounds are normal. No distension. No tenderness. There is no guarding.   Musculoskeletal: Normal range of motion.  No tenderness.  Lymphadenopathy:  No cervical adenopathy.   Neurological:  No new focal neurological deficits observed   Skin: Skin is warm and dry. No rash noted.   Psychiatric: Normal mood and affect.  Behavior is normal. Judgment normal.    Assessment/Plan     Dyspnea on Exertion  New Left Pleural Effusion  Severe Group 2 Pulmonary HTN  HFpEF  VHD - mod AI / mild MR / sev TR  Afib / SSS / PPM on Eliquis  Pleural effusion is quite large and could explain her symptoms.  Status of underlying lung parenchyma beneath effusion unknown.  Ideally would like to tap the effusion dry, send pleural fluid studies, then do full CT Chest post procedure to evaluate underlying lung parenchyma / pleural surface.  Differential is broad.  She does have a small right effusion but clearly this is asymmetric.  No recent infectious complaints to suggest parapneumonic effusion.  Has been on AC - doubt PE.  She has had several falls, this could be a retained chronic hemothorax.  Malignancy also in differential but she has no personal history of malignancy, smoking, etc and is up to date on age appropriate cancer screening.      Would like to see her back ~2 weeks after thoracentesis with PFT, 6MW, and CXR.  If dyspnea not fully explained by effusion, it may be worth referral to Dr. Pierce Resenedz Pulmonary HTN specialist at  for repeat cath and potential titration of selective pulmonary vasodilators.    RTC ~1 month w/ Full PFT / 6MW / CXR    Marcos Shrestha MD  Pulmonology and Critical Care Medicine  10/27/21 12:24 EDT  Electronically Signed    C.C.:  Sang Sosa MD, Lana Flores MD

## 2021-11-02 ENCOUNTER — APPOINTMENT (OUTPATIENT)
Dept: PREADMISSION TESTING | Facility: HOSPITAL | Age: 81
End: 2021-11-02

## 2021-11-02 LAB — SARS-COV-2 RNA PNL SPEC NAA+PROBE: NOT DETECTED

## 2021-11-02 PROCEDURE — U0005 INFEC AGEN DETEC AMPLI PROBE: HCPCS | Performed by: NURSE PRACTITIONER

## 2021-11-02 PROCEDURE — U0004 COV-19 TEST NON-CDC HGH THRU: HCPCS | Performed by: NURSE PRACTITIONER

## 2021-11-03 ENCOUNTER — APPOINTMENT (OUTPATIENT)
Dept: PREADMISSION TESTING | Facility: HOSPITAL | Age: 81
End: 2021-11-03

## 2021-11-05 ENCOUNTER — HOSPITAL ENCOUNTER (OUTPATIENT)
Dept: CT IMAGING | Facility: HOSPITAL | Age: 81
Discharge: HOME | End: 2021-11-05

## 2021-11-05 ENCOUNTER — HOSPITAL ENCOUNTER (OUTPATIENT)
Dept: ULTRASOUND IMAGING | Facility: HOSPITAL | Age: 81
Discharge: HOME | End: 2021-11-05
Attending: INTERNAL MEDICINE | Admitting: INTERNAL MEDICINE

## 2021-11-05 VITALS
HEIGHT: 63 IN | SYSTOLIC BLOOD PRESSURE: 116 MMHG | WEIGHT: 146.4 LBS | OXYGEN SATURATION: 99 % | TEMPERATURE: 97.9 F | HEART RATE: 86 BPM | DIASTOLIC BLOOD PRESSURE: 45 MMHG | RESPIRATION RATE: 20 BRPM | BODY MASS INDEX: 25.94 KG/M2

## 2021-11-05 DIAGNOSIS — I27.20 PULMONARY HTN (HCC): Primary | ICD-10-CM

## 2021-11-05 DIAGNOSIS — I27.20 PULMONARY HTN (HCC): ICD-10-CM

## 2021-11-05 DIAGNOSIS — J90 PLEURAL EFFUSION: ICD-10-CM

## 2021-11-05 LAB
ANION GAP SERPL CALCULATED.3IONS-SCNC: 14 MMOL/L (ref 5–15)
APPEARANCE FLD: ABNORMAL
BASOPHILS # BLD AUTO: 0.04 10*3/MM3 (ref 0–0.2)
BASOPHILS NFR BLD AUTO: 0.6 % (ref 0–1.5)
BUN SERPL-MCNC: 16 MG/DL (ref 8–23)
BUN/CREAT SERPL: 18.4 (ref 7–25)
CALCIUM SPEC-SCNC: 9 MG/DL (ref 8.6–10.5)
CHLORIDE SERPL-SCNC: 97 MMOL/L (ref 98–107)
CO2 SERPL-SCNC: 26 MMOL/L (ref 22–29)
COLOR FLD: YELLOW
CREAT SERPL-MCNC: 0.87 MG/DL (ref 0.57–1)
DEPRECATED RDW RBC AUTO: 43.8 FL (ref 37–54)
EOSINOPHIL # BLD AUTO: 0.02 10*3/MM3 (ref 0–0.4)
EOSINOPHIL NFR BLD AUTO: 0.3 % (ref 0.3–6.2)
ERYTHROCYTE [DISTWIDTH] IN BLOOD BY AUTOMATED COUNT: 15.7 % (ref 12.3–15.4)
GFR SERPL CREATININE-BSD FRML MDRD: 62 ML/MIN/1.73
GLUCOSE FLD-MCNC: 82 MG/DL
GLUCOSE SERPL-MCNC: 107 MG/DL (ref 65–99)
HCT VFR BLD AUTO: 28.6 % (ref 34–46.6)
HGB BLD-MCNC: 8.3 G/DL (ref 12–15.9)
IMM GRANULOCYTES # BLD AUTO: 0.02 10*3/MM3 (ref 0–0.05)
IMM GRANULOCYTES NFR BLD AUTO: 0.3 % (ref 0–0.5)
INR PPP: 1.19 (ref 0.85–1.16)
LDH FLD-CCNC: 96 U/L
LYMPHOCYTES # BLD AUTO: 0.88 10*3/MM3 (ref 0.7–3.1)
LYMPHOCYTES NFR BLD AUTO: 13.2 % (ref 19.6–45.3)
LYMPHOCYTES NFR FLD MANUAL: 97 %
MCH RBC QN AUTO: 22.3 PG (ref 26.6–33)
MCHC RBC AUTO-ENTMCNC: 29 G/DL (ref 31.5–35.7)
MCV RBC AUTO: 76.9 FL (ref 79–97)
MONOCYTES # BLD AUTO: 0.73 10*3/MM3 (ref 0.1–0.9)
MONOCYTES NFR BLD AUTO: 10.9 % (ref 5–12)
MONOCYTES NFR FLD: 3 %
NEUTROPHILS NFR BLD AUTO: 4.98 10*3/MM3 (ref 1.7–7)
NEUTROPHILS NFR BLD AUTO: 74.7 % (ref 42.7–76)
NRBC BLD AUTO-RTO: 0 /100 WBC (ref 0–0.2)
PH FLD: 7.53 [PH]
PLATELET # BLD AUTO: 321 10*3/MM3 (ref 140–450)
PMV BLD AUTO: 9.6 FL (ref 6–12)
POTASSIUM SERPL-SCNC: 4 MMOL/L (ref 3.5–5.2)
PROT FLD-MCNC: 2.8 G/DL
PROTHROMBIN TIME: 14.8 SECONDS (ref 11.4–14.4)
RBC # BLD AUTO: 3.72 10*6/MM3 (ref 3.77–5.28)
RBC # FLD AUTO: 8000 /MM3
SODIUM SERPL-SCNC: 137 MMOL/L (ref 136–145)
WBC # BLD AUTO: 6.67 10*3/MM3 (ref 3.4–10.8)
WBC # FLD AUTO: 907 /MM3

## 2021-11-05 PROCEDURE — 84157 ASSAY OF PROTEIN OTHER: CPT | Performed by: INTERNAL MEDICINE

## 2021-11-05 PROCEDURE — 85610 PROTHROMBIN TIME: CPT | Performed by: RADIOLOGY

## 2021-11-05 PROCEDURE — 87206 SMEAR FLUORESCENT/ACID STAI: CPT | Performed by: INTERNAL MEDICINE

## 2021-11-05 PROCEDURE — 88112 CYTOPATH CELL ENHANCE TECH: CPT | Performed by: INTERNAL MEDICINE

## 2021-11-05 PROCEDURE — 85025 COMPLETE CBC W/AUTO DIFF WBC: CPT | Performed by: RADIOLOGY

## 2021-11-05 PROCEDURE — 25010000002 FENTANYL CITRATE (PF) 50 MCG/ML SOLUTION: Performed by: RADIOLOGY

## 2021-11-05 PROCEDURE — 80048 BASIC METABOLIC PNL TOTAL CA: CPT | Performed by: RADIOLOGY

## 2021-11-05 PROCEDURE — 87075 CULTR BACTERIA EXCEPT BLOOD: CPT | Performed by: INTERNAL MEDICINE

## 2021-11-05 PROCEDURE — 87116 MYCOBACTERIA CULTURE: CPT | Performed by: INTERNAL MEDICINE

## 2021-11-05 PROCEDURE — 87205 SMEAR GRAM STAIN: CPT | Performed by: INTERNAL MEDICINE

## 2021-11-05 PROCEDURE — 71250 CT THORAX DX C-: CPT

## 2021-11-05 PROCEDURE — 87070 CULTURE OTHR SPECIMN AEROBIC: CPT | Performed by: INTERNAL MEDICINE

## 2021-11-05 PROCEDURE — 82945 GLUCOSE OTHER FLUID: CPT | Performed by: INTERNAL MEDICINE

## 2021-11-05 PROCEDURE — 83615 LACTATE (LD) (LDH) ENZYME: CPT | Performed by: INTERNAL MEDICINE

## 2021-11-05 PROCEDURE — 83986 ASSAY PH BODY FLUID NOS: CPT | Performed by: INTERNAL MEDICINE

## 2021-11-05 PROCEDURE — 89051 BODY FLUID CELL COUNT: CPT | Performed by: INTERNAL MEDICINE

## 2021-11-05 PROCEDURE — C1729 CATH, DRAINAGE: HCPCS

## 2021-11-05 PROCEDURE — 88305 TISSUE EXAM BY PATHOLOGIST: CPT | Performed by: INTERNAL MEDICINE

## 2021-11-05 PROCEDURE — 87015 SPECIMEN INFECT AGNT CONCNTJ: CPT | Performed by: INTERNAL MEDICINE

## 2021-11-05 PROCEDURE — 76942 ECHO GUIDE FOR BIOPSY: CPT

## 2021-11-05 PROCEDURE — 87102 FUNGUS ISOLATION CULTURE: CPT | Performed by: INTERNAL MEDICINE

## 2021-11-05 RX ORDER — FENTANYL CITRATE 50 UG/ML
INJECTION, SOLUTION INTRAMUSCULAR; INTRAVENOUS
Status: COMPLETED | OUTPATIENT
Start: 2021-11-05 | End: 2021-11-05

## 2021-11-05 RX ORDER — SODIUM CHLORIDE 0.9 % (FLUSH) 0.9 %
3 SYRINGE (ML) INJECTION EVERY 12 HOURS SCHEDULED
Status: DISCONTINUED | OUTPATIENT
Start: 2021-11-05 | End: 2021-11-08 | Stop reason: HOSPADM

## 2021-11-05 RX ORDER — LIDOCAINE HYDROCHLORIDE 10 MG/ML
5 INJECTION, SOLUTION EPIDURAL; INFILTRATION; INTRACAUDAL; PERINEURAL ONCE
Status: COMPLETED | OUTPATIENT
Start: 2021-11-05 | End: 2021-11-05

## 2021-11-05 RX ORDER — SODIUM CHLORIDE 0.9 % (FLUSH) 0.9 %
10 SYRINGE (ML) INJECTION AS NEEDED
Status: DISCONTINUED | OUTPATIENT
Start: 2021-11-05 | End: 2021-11-08 | Stop reason: HOSPADM

## 2021-11-05 RX ORDER — FENTANYL CITRATE 50 UG/ML
INJECTION, SOLUTION INTRAMUSCULAR; INTRAVENOUS
Status: DISCONTINUED
Start: 2021-11-05 | End: 2021-11-06 | Stop reason: HOSPADM

## 2021-11-05 RX ADMIN — FENTANYL CITRATE 25 MCG: 50 INJECTION, SOLUTION INTRAMUSCULAR; INTRAVENOUS at 12:56

## 2021-11-05 RX ADMIN — FENTANYL CITRATE 25 MCG: 50 INJECTION, SOLUTION INTRAMUSCULAR; INTRAVENOUS at 12:52

## 2021-11-05 RX ADMIN — LIDOCAINE HYDROCHLORIDE 5 ML: 10 INJECTION, SOLUTION EPIDURAL; INFILTRATION; INTRACAUDAL; PERINEURAL at 13:17

## 2021-11-05 NOTE — NURSING NOTE
US guided left thoracentesis performed by MD Her. 1200 mls removed from pleural space. Patient tolerated well. Report called to nurse.

## 2021-11-05 NOTE — POST-PROCEDURE NOTE
Vascular Interventional Radiology  Procedure Note    Date: 09/07/21      Time: 11:23 EDT     Pre-op Diagnosis: Pleural Effusion     Post-op Diagnosis: Pleural Effusion    Procedure: US guided Thoracentesis. Via posterior ICS. Procedure terminated after 1.2 liter recovered.    Volume removed: See report.    Surgeon: Barrett Her MD     Assistants: NA    Sedation: None    Estimated Blood Loss (EBL): 0 cc    IVF: NA    Findings: Above    Specimens: To the lab.    Complications: None immediate. CT pending.    Disposition: IR Recovery.    Barrett Her MD    Vascular Interventional Radiology

## 2021-11-05 NOTE — PRE-PROCEDURE NOTE
[facility]   Vascular Interventional Radiology  History & Physicial    Patient Name:Kenisha Ruvalcaba  : 1940  MRN: [id number]    Primary Care Physician: Lana Flores MD    Referring Physician: Marcos Mcdermott*     Date of admission: 2021    Subjective   Subjective     Reason for Consult: L pleural effusion    History of Present Illness   Kenisha Ruvalcaba is a 81 y.o. female referred to IR as noted above.      Active Hospital Problems:  There are no active hospital problems to display for this patient.      Personal History     Past Medical History:   Diagnosis Date   • Anesthesia complication     HARD TO WAKE, Hallucinations, Agitation    • Anxiety    • Broken arm 2019    left upper arm   • Broken nose 2019   • Chronic hip pain, left 2019   • Dyslipidemia    • Fall 2019   • History of cardioversion 2010    initiation of Tambocor and subsequent external cardioversion.   • ÁNGELA (obstructive sleep apnea)    • Paroxysmal atrial fibrillation (HCC)    • Vertigo    • Wears glasses        Past Surgical History:   Procedure Laterality Date   • BREAST BIOPSY Left     MARKER IN PLACE   • CARDIAC CATHETERIZATION Left 2016    Procedure: Cardiac catheterization;  Surgeon: Chuck Clark MD;  Location:  [facility];  Service:    • CARDIAC CATHETERIZATION N/A 2020    Procedure: Right Heart Cath;  Surgeon: Sang Sosa MD;  Location:  [facility];  Service: Cardiology   • CARDIAC ELECTROPHYSIOLOGY PROCEDURE N/A 2017    Procedure: Pacemaker DC new;  Surgeon: Darryl Swenson MD;  Location:  [facility] EP INVASIVE [facility];  Service:    • CARDIAC ELECTROPHYSIOLOGY PROCEDURE N/A 2019    Procedure: Ablation atrial fibrillation, hold Tikosyn for 5 days stay on metoprolol.;  Surgeon: Darryl Swenson MD;  Location:  [facility] EP INVASIVE LOCATION;  Service: Cardiovascular   • CAROTID ENDARTERECTOMY Left    • CATARACT  "EXTRACTION Bilateral    • COLONOSCOPY      every 5-10 years. does not plan to have anymore   • PACEMAKER IMPLANTATION     • TOTAL HIP ARTHROPLASTY Left 1/30/2019    Procedure: TOTAL HIP ARTHROPLASTY LEFT;  Surgeon: Freddie Vale MD;  Location: [facility];  Service: Orthopedics   • TUBAL ABDOMINAL LIGATION         Family History: Her family history includes Alzheimer's disease in her mother; Aortic aneurysm in her father; Cancer in her mother; Heart valve disorder in her sister.     Social History: She  reports that she has never smoked. She has never used smokeless tobacco. She reports current alcohol use of about 1.0 standard drink of alcohol per week. She reports that she does not use drugs.    Home Medications:  PARoxetine, acetaminophen, apixaban, aspirin, cholecalciferol, coenzyme Q10, furosemide, levothyroxine, losartan, meclizine, metoprolol succinate XL, nitroglycerin, potassium chloride, rosuvastatin, sildenafil, and traZODone    Current Medications:  •  lidocaine PF 1%  •  sodium chloride  •  sodium chloride         Allergies:  She is allergic to beta adrenergic blockers and sulfa antibiotics.    Review of Systems    Objective    Objective     Vitals:      Visit Vitals  /71 (BP Location: Left arm)   Pulse 83   Temp 97.9 °F (36.6 °C) (Tympanic)   Resp 18   Ht 160 cm (63\")   Wt 66.4 kg (146 lb 6.4 oz)   LMP  (LMP Unknown)   SpO2 93%   BMI 25.93 kg/m²        Physical Exam    A&Ox3. Able to communicate  No Apparent Distress  Average physique    Result Review      I have personally reviewed the results from the time of this admission to 11/5/2021 12:33 EDT and agree with these findings.  [x]  Laboratory  []  Microbiology  [x]  Radiology  []  EKG/Telemetry   []  Cardiology/Vascular   []  Pathology  []  Old records  []  Other:    Most notable findings include: As noted:    Results from last 7 days   Lab Units 11/05/21  1005 11/05/21  1004   INR   --  1.19*   HEMOGLOBIN g/dL 8.3*  --    HEMATOCRIT % " 28.6*  --    PLATELETS 10*3/mm3 321  --        Estimated Creatinine Clearance: 45.6 mL/min (by C-G formula based on SCr of 0.87 mg/dL).   Creatinine   Date Value Ref Range Status   11/05/2021 0.87 0.57 - 1.00 mg/dL Final       Assessment/Plan   Assessment / Plan     Brief Patient Summary:  Kenisha Ruvalcaba is a 81 y.o. female referred to the IR service with above problem.    Plan:   USG L thoracentesis.    Barrett Her MD   Vascular Interventional Radiology  11/05/21   12:33 PM EDT

## 2021-11-08 ENCOUNTER — TELEPHONE (OUTPATIENT)
Dept: INFUSION THERAPY | Facility: HOSPITAL | Age: 81
End: 2021-11-08

## 2021-11-08 LAB
BACTERIA FLD CULT: NORMAL
GRAM STN SPEC: NORMAL
GRAM STN SPEC: NORMAL

## 2021-11-09 ENCOUNTER — LAB (OUTPATIENT)
Dept: LAB | Facility: HOSPITAL | Age: 81
End: 2021-11-09

## 2021-11-09 ENCOUNTER — TELEPHONE (OUTPATIENT)
Dept: PULMONOLOGY | Facility: CLINIC | Age: 81
End: 2021-11-09

## 2021-11-09 ENCOUNTER — OFFICE VISIT (OUTPATIENT)
Dept: INTERNAL MEDICINE | Facility: CLINIC | Age: 81
End: 2021-11-09

## 2021-11-09 VITALS
BODY MASS INDEX: 25.86 KG/M2 | TEMPERATURE: 97.8 F | HEART RATE: 147 BPM | OXYGEN SATURATION: 94 % | WEIGHT: 146 LBS | SYSTOLIC BLOOD PRESSURE: 124 MMHG | DIASTOLIC BLOOD PRESSURE: 62 MMHG

## 2021-11-09 DIAGNOSIS — E03.9 HYPOTHYROIDISM, UNSPECIFIED TYPE: ICD-10-CM

## 2021-11-09 DIAGNOSIS — D50.9 MICROCYTIC ANEMIA: ICD-10-CM

## 2021-11-09 DIAGNOSIS — I27.20 PULMONARY HTN (HCC): ICD-10-CM

## 2021-11-09 DIAGNOSIS — E55.9 VITAMIN D DEFICIENCY: ICD-10-CM

## 2021-11-09 DIAGNOSIS — D50.9 MICROCYTIC ANEMIA: Primary | ICD-10-CM

## 2021-11-09 DIAGNOSIS — F41.9 ANXIETY: ICD-10-CM

## 2021-11-09 DIAGNOSIS — I10 ESSENTIAL HYPERTENSION: ICD-10-CM

## 2021-11-09 DIAGNOSIS — I25.119 CORONARY ARTERY DISEASE INVOLVING NATIVE CORONARY ARTERY OF NATIVE HEART WITH ANGINA PECTORIS (HCC): ICD-10-CM

## 2021-11-09 DIAGNOSIS — J90 PLEURAL EFFUSION: ICD-10-CM

## 2021-11-09 DIAGNOSIS — E78.5 DYSLIPIDEMIA: ICD-10-CM

## 2021-11-09 DIAGNOSIS — I48.0 PAROXYSMAL ATRIAL FIBRILLATION (HCC): ICD-10-CM

## 2021-11-09 LAB
25(OH)D3 SERPL-MCNC: 23.5 NG/ML
ALBUMIN SERPL-MCNC: 3.9 G/DL (ref 3.5–5.2)
ALBUMIN/GLOB SERPL: 1.2 G/DL
ALP SERPL-CCNC: 110 U/L (ref 39–117)
ALT SERPL W P-5'-P-CCNC: 8 U/L (ref 1–33)
ANION GAP SERPL CALCULATED.3IONS-SCNC: 11.4 MMOL/L (ref 5–15)
AST SERPL-CCNC: 12 U/L (ref 1–32)
BILIRUB SERPL-MCNC: 0.4 MG/DL (ref 0–1.2)
BUN SERPL-MCNC: 15 MG/DL (ref 8–23)
BUN/CREAT SERPL: 22.1 (ref 7–25)
CALCIUM SPEC-SCNC: 9.7 MG/DL (ref 8.6–10.5)
CHLORIDE SERPL-SCNC: 101 MMOL/L (ref 98–107)
CO2 SERPL-SCNC: 27.6 MMOL/L (ref 22–29)
CREAT SERPL-MCNC: 0.68 MG/DL (ref 0.57–1)
FERRITIN SERPL-MCNC: 36.5 NG/ML (ref 13–150)
GFR SERPL CREATININE-BSD FRML MDRD: 83 ML/MIN/1.73
GIE STN SPEC: NORMAL
GLOBULIN UR ELPH-MCNC: 3.2 GM/DL
GLUCOSE SERPL-MCNC: 99 MG/DL (ref 65–99)
IRON 24H UR-MRATE: 19 MCG/DL (ref 37–145)
IRON SATN MFR SERPL: 4 % (ref 20–50)
LAB AP CASE REPORT: NORMAL
PATH REPORT.FINAL DX SPEC: NORMAL
POTASSIUM SERPL-SCNC: 4.2 MMOL/L (ref 3.5–5.2)
PROT SERPL-MCNC: 7.1 G/DL (ref 6–8.5)
SODIUM SERPL-SCNC: 140 MMOL/L (ref 136–145)
TIBC SERPL-MCNC: 483 MCG/DL (ref 298–536)
TRANSFERRIN SERPL-MCNC: 324 MG/DL (ref 200–360)
TSH SERPL DL<=0.05 MIU/L-ACNC: 1.09 UIU/ML (ref 0.27–4.2)

## 2021-11-09 PROCEDURE — 84466 ASSAY OF TRANSFERRIN: CPT

## 2021-11-09 PROCEDURE — 85025 COMPLETE CBC W/AUTO DIFF WBC: CPT

## 2021-11-09 PROCEDURE — 99204 OFFICE O/P NEW MOD 45 MIN: CPT | Performed by: STUDENT IN AN ORGANIZED HEALTH CARE EDUCATION/TRAINING PROGRAM

## 2021-11-09 PROCEDURE — 83540 ASSAY OF IRON: CPT

## 2021-11-09 PROCEDURE — 36415 COLL VENOUS BLD VENIPUNCTURE: CPT

## 2021-11-09 PROCEDURE — 84443 ASSAY THYROID STIM HORMONE: CPT

## 2021-11-09 PROCEDURE — 82728 ASSAY OF FERRITIN: CPT

## 2021-11-09 PROCEDURE — 82306 VITAMIN D 25 HYDROXY: CPT

## 2021-11-09 PROCEDURE — 80053 COMPREHEN METABOLIC PANEL: CPT

## 2021-11-09 NOTE — TELEPHONE ENCOUNTER
Called patient with results of thoracentesis.  Plan on seeing her back on 11/19 with repeat CXR to reassess.

## 2021-11-09 NOTE — PROGRESS NOTES
New Patient Office Visit      Date: 2021      Patient Name: Kenisha Ruvalcaba  : 1940   MRN: [id number]     Chief Complaint:    Chief Complaint   Patient presents with   • Transitional Care Management     All in CB, low O2 read       History of Present Illness: Kenisha Ruvalcaba is a 81 y.o. female who presents to clinic today to establish care.  She has pulmonary hypertension that is treated with sildenafil 20 mg 3 times daily.  He does not require supplemental oxygen for this.  She was recently found to have a left-sided pleural effusion (there is also a smaller pleural effusion on the right).  She had a thoracentesis recently with studies pending.  The thought is that this may be due to her pulmonary hypertension.  She has atrial fibrillation and is on Eliquis 5 mg twice daily and metoprolol succinate 50 mg once daily.  She has had an ablation and a pacemaker placed and has since been in normal sinus rhythm the majority of the time.  She denies shortness of breath at rest but has severe dyspnea with exertion and is fatigued very quickly.  She denies melena and hematochezia.  She notes an occasional bright red blood on the toilet paper related to a hemorrhoid.  Her last colonoscopy was about five or 6 years ago and was noted to be normal and told that she did not need any further colonoscopies due to age.    Subjective     Review of System: Review of Systems   Constitutional: Positive for fatigue. Negative for chills and fever.   Respiratory: Positive for shortness of breath. Negative for cough.    Cardiovascular: Negative for chest pain, palpitations and leg swelling.   Gastrointestinal: Positive for constipation and diarrhea.   Hematological: Bruises/bleeds easily.      I have reviewed the ROS documented by my clinical staff, updated appropriately and I agree. Nereida Webster MD    Past Medical History:   Past Medical History:   Diagnosis Date   • Anesthesia complication     HARD TO WAKE,  Hallucinations, Agitation    • Anxiety    • Broken arm 08/30/2019    left upper arm   • Broken nose 08/30/2019   • Chronic hip pain, left 1/7/2019   • Dyslipidemia    • Fall 08/30/2019   • History of cardioversion 12/03/2010    initiation of Tambocor and subsequent external cardioversion.   • ÁNGELA (obstructive sleep apnea)    • Paroxysmal atrial fibrillation (HCC)    • Vertigo    • Wears glasses        Past Surgical History:   Past Surgical History:   Procedure Laterality Date   • BREAST BIOPSY Left     MARKER IN PLACE   • CARDIAC CATHETERIZATION Left 6/16/2016    Procedure: Cardiac catheterization;  Surgeon: Chuck Clark MD;  Location:  [facility];  Service:    • CARDIAC CATHETERIZATION N/A 1/30/2020    Procedure: Right Heart Cath;  Surgeon: Sang Sosa MD;  Location:  [facility];  Service: Cardiology   • CARDIAC ELECTROPHYSIOLOGY PROCEDURE N/A 2/17/2017    Procedure: Pacemaker DC new;  Surgeon: Darryl Swenson MD;  Location:  [facility];  Service:    • CARDIAC ELECTROPHYSIOLOGY PROCEDURE N/A 4/8/2019    Procedure: Ablation atrial fibrillation, hold Tikosyn for 5 days stay on metoprolol.;  Surgeon: Darryl Swenson MD;  Location:  [facility];  Service: Cardiovascular   • CAROTID ENDARTERECTOMY Left    • CATARACT EXTRACTION Bilateral    • COLONOSCOPY      every 5-10 years. does not plan to have anymore   • PACEMAKER IMPLANTATION     • TOTAL HIP ARTHROPLASTY Left 1/30/2019    Procedure: TOTAL HIP ARTHROPLASTY LEFT;  Surgeon: Freddie Vale MD;  Location:  [facility];  Service: Orthopedics   • TUBAL ABDOMINAL LIGATION         Family History:   Family History   Problem Relation Age of Onset   • Alzheimer's disease Mother    • Cancer Mother    • Aortic aneurysm Father    • Heart valve disorder Sister        Social History:   Social History     Socioeconomic History   • Marital status:    Tobacco Use   • Smoking status: Never  Smoker   • Smokeless tobacco: Never Used   Vaping Use   • Vaping Use: Never used   Substance and Sexual Activity   • Alcohol use: Yes     Alcohol/week: 1.0 standard drink     Types: 1 Glasses of wine per week     Comment: occas   • Drug use: No   • Sexual activity: Defer       Medications:     Current Outpatient Medications:   •  acetaminophen (TYLENOL) 325 MG tablet, Take 2 tablets by mouth Every 4 (Four) Hours As Needed for Mild Pain ., Disp: , Rfl:   •  aspirin 81 MG EC tablet, Take 81 mg by mouth Every Night., Disp: , Rfl:   •  cholecalciferol (VITAMIN D3) 1000 UNITS tablet, Take 1,000 Units by mouth Daily., Disp: , Rfl:   •  coenzyme Q10 100 MG capsule, Take 100 mg by mouth 2 (Two) Times a Week., Disp: , Rfl:   •  Eliquis 5 MG tablet tablet, TAKE 1 TABLET BY MOUTH TWICE A DAY, Disp: 60 tablet, Rfl: 5  •  furosemide (LASIX) 20 MG tablet, TAKE 1 TABLET BY MOUTH EVERY DAY AS NEEDED FOR EDEMA, Disp: 90 tablet, Rfl: 3  •  levothyroxine (SYNTHROID, LEVOTHROID) 50 MCG tablet, Take 50 mcg by mouth Every Morning., Disp: , Rfl:   •  losartan (COZAAR) 25 MG tablet, Take 1 tablet by mouth Every Night., Disp: 90 tablet, Rfl: 3  •  meclizine (ANTIVERT) 25 MG tablet, Take 25 mg by mouth 3 (Three) Times a Day As Needed for dizziness., Disp: , Rfl:   •  metoprolol succinate XL (TOPROL-XL) 100 MG 24 hr tablet, Take 0.5 tablets by mouth Daily., Disp: 30 tablet, Rfl: 6  •  PARoxetine (PAXIL) 10 MG tablet, Take 10 mg by mouth Every Night., Disp: , Rfl:   •  potassium chloride 10 MEQ CR tablet, TAKE 1 TABLET BY MOUTH EVERY DAY WITH FUROSEMIDE, Disp: 90 tablet, Rfl: 3  •  rosuvastatin (CRESTOR) 20 MG tablet, Take 20 mg by mouth 3 (Three) Times a Week. MON, WEDS, FRI, Disp: , Rfl:   •  sildenafil (REVATIO) 20 MG tablet, TAKE ONE TABLET BY MOUTH THREE TIMES A DAY, Disp: 90 tablet, Rfl: 3  •  traZODone (DESYREL) 50 MG tablet, TAKE 1-2 TABLETS AT BEDTIME AS NEEDED INSOMNIA, Disp: , Rfl: 5  No current facility-administered medications for  this visit.    Allergies:   Allergies   Allergen Reactions   • Beta Adrenergic Blockers Dizziness     DIZZINESS    • Sulfa Antibiotics Rash     RASH        Objective     Physical Exam:   Vital Signs:   Vitals:    11/09/21 1046   BP: 124/62   Pulse: (!) 147   Temp: 97.8 °F (36.6 °C)   SpO2: 94%   Weight: 66.2 kg (146 lb)  Comment: pt reported   PainSc:   2     Body mass index is 25.86 kg/m².     Physical Exam  Vitals and nursing note reviewed.   Constitutional:       General: She is not in acute distress.     Appearance: Normal appearance.   HENT:      Head: Normocephalic and atraumatic.   Cardiovascular:      Rate and Rhythm: Regular rhythm. Tachycardia present.      Heart sounds: Murmur heard.        Comments: HR on exam ~ 95.   Pulmonary:      Effort: Pulmonary effort is normal. No respiratory distress.      Breath sounds: No wheezing, rhonchi or rales.      Comments: Decreased breath sounds over posterior left lower lobe  Musculoskeletal:      Right lower leg: No edema.      Left lower leg: No edema.   Skin:     General: Skin is warm and dry.   Neurological:      General: No focal deficit present.      Mental Status: She is alert and oriented to person, place, and time.   Psychiatric:         Mood and Affect: Mood normal.         Behavior: Behavior normal.         Assessment / Plan      Assessment/Plan:   Diagnoses and all orders for this visit:    1. Microcytic anemia (Primary)  Has a history of iron deficiency so this is the likely cause of current microcytic anemia.  May be due to occult bleeding which she would be more susceptible to being on Eliquis 5 mg twice daily and aspirin 81 mg daily.  Unknown source with most recent colonoscopy being 5-6 years ago and was reportedly normal and no further colonoscopies were recommended due to age.  She would not be interested in further evaluation that would involve anesthesia.  CBC, iron profile, ferritin ordered.  Discussed treatment for iron deficiency would be iron  replacement every other day.  If unable to tolerate, would recommend iron infusions.  This may be contributing to fatigue and dyspnea on exertion.    2. Pleural effusion  1.2 L of fluid removed on 11/05/2021.  Thought to be due to pulmonary hypertension.  Follow-up appointment to discuss lab results from fluid studies is 11/19/2021.    3. Pulmonary HTN (HCC)  Continue sildenafil 20 mg 3 times daily.  Discussed possibility of pulmonary or cardiac rehab to help build strength.  Patient and daughter to discuss this with cardiologist/pulmonologist.    4. Paroxysmal atrial fibrillation (HCC)  Continue metoprolol succinate 50 mg daily (recent decrease from 100 mg daily) and Eliquis 5 mg twice daily.  She does not currently meet criteria to reduce Eliquis dose to 2.5 mg twice daily.  She is >age 79 but is not < 60 kg and does not have a creatinine >1.5.     5. Essential hypertension  Continue losartan 25 mg daily.    6. Coronary artery disease involving native coronary artery of native heart with angina pectoris (HCC)  Continue aspirin 81 mg daily    7. Dyslipidemia  Continue rosuvastatin 20 mg three times weekly    8. Hypothyroidism, unspecified type   TSH ordered.  Currently on levothyroxine 50 mcg daily.  Will titrate if indicated based on lab results.    9. Anxiety  Continue Paroxetine 10 mg daily.     11. Vitamin D deficiency  Vitamin D level ordered.     12. Insomnia   Continue Trazodone 50 mg at night        Follow Up:   Return in about 2 weeks (around 11/23/2021) for Recheck.    Time:   I spent approximately 45 minutes providing clinical care for this patient; including review of patient's chart and provider documentation, face to face time spent with patient in examination room (obtaining history, performing physical exam, discussing diagnosis and management options), placing orders, and completing patient documentation.     Addendum (11/10/2021): Hemoglobin noted to be low at 9.2 (previous was 8.3 on 11/05/2021).  Anemia is microcytic.  Iron panel demonstrated low iron and saturation but normal transferrin and TIBC. Ferritin noted to be on the low side of normal.  These studies are consistent with a mixed picture of iron deficiency anemia with anemia of chronic disease.  A ferritin <100 mcg/L and a transferrin saturation <20 indicate that iron deficiency needs to be treated when anemia of chronic disease is present as well.  Will treat with ferrous gluconate every other day (could also recommend taking on Monday, Wednesday, Friday as this may be easier scheduling since this is when she takes her statin).     The cause of iron deficiency anemia needs to be evaluated and addressed if possible. Differential includes dietary deficiency, decreased absorption, and blood loss. For adults in resource-rich countries, dietary iron deficiency is exceedingly rare because of iron availability in many meats and vegetables, along with routine supplementation of grains with iron. Certain gastrointestinal conditions may lead to iron deficiency due to impaired absorption. Examples include inflammatory bowel disease, gastric bypass surgery, celiac disease, and autoimmune gastritis. Ms. De does not have a diagnoses or symptoms of any of these conditions that would impact iron absorption.  There are no obvious sources of blood loss, but Ms. De is on Eliquis 5 mg BID and ASA 81 mg daily which makes blood loss more likely. Occult GI blood loss would be the most likely source.  She denies melena and hematochezia (noted in the past seeing bright red blood on toilet paper related to hemorrhoid though).  When asked about bowel habits, she noted alternating between diarrhea and constipation - this has been chronic.  Last colonoscopy was reportedly in ~ 8 years ago and was reportedly normal.  She was told she would no longer need screening colonoscopies due to age.  Obtaining records of this from prior PCP.  Will discuss referral to GI at next  appointment in ~ 2 weeks. Approach to evaluation may include upper and lower endoscopy.  Given patient's pulmonary HTN, anesthesia is higher risk for her and should be considered prior to work up.     Nereida Webster MD  [facility]

## 2021-11-10 LAB
BACTERIA SPEC ANAEROBE CULT: NORMAL
BASOPHILS # BLD AUTO: 0.04 10*3/MM3 (ref 0–0.2)
BASOPHILS NFR BLD AUTO: 0.5 % (ref 0–1.5)
DEPRECATED RDW RBC AUTO: 41.5 FL (ref 37–54)
EOSINOPHIL # BLD AUTO: 0.07 10*3/MM3 (ref 0–0.4)
EOSINOPHIL NFR BLD AUTO: 0.9 % (ref 0.3–6.2)
ERYTHROCYTE [DISTWIDTH] IN BLOOD BY AUTOMATED COUNT: 14.8 % (ref 12.3–15.4)
HCT VFR BLD AUTO: 33 % (ref 34–46.6)
HGB BLD-MCNC: 9.2 G/DL (ref 12–15.9)
IMM GRANULOCYTES # BLD AUTO: 0.02 10*3/MM3 (ref 0–0.05)
IMM GRANULOCYTES NFR BLD AUTO: 0.3 % (ref 0–0.5)
LYMPHOCYTES # BLD AUTO: 0.96 10*3/MM3 (ref 0.7–3.1)
LYMPHOCYTES NFR BLD AUTO: 13 % (ref 19.6–45.3)
MCH RBC QN AUTO: 21.9 PG (ref 26.6–33)
MCHC RBC AUTO-ENTMCNC: 27.9 G/DL (ref 31.5–35.7)
MCV RBC AUTO: 78.6 FL (ref 79–97)
MONOCYTES # BLD AUTO: 1 10*3/MM3 (ref 0.1–0.9)
MONOCYTES NFR BLD AUTO: 13.6 % (ref 5–12)
NEUTROPHILS NFR BLD AUTO: 5.29 10*3/MM3 (ref 1.7–7)
NEUTROPHILS NFR BLD AUTO: 71.7 % (ref 42.7–76)
NRBC BLD AUTO-RTO: 0 /100 WBC (ref 0–0.2)
PLATELET # BLD AUTO: 355 10*3/MM3 (ref 140–450)
PMV BLD AUTO: 9.5 FL (ref 6–12)
RBC # BLD AUTO: 4.2 10*6/MM3 (ref 3.77–5.28)
WBC # BLD AUTO: 7.38 10*3/MM3 (ref 3.4–10.8)

## 2021-11-10 NOTE — PROGRESS NOTES
Call Ms. De and let her know that she does have low iron levels resulting in anemia.  This is likely contributing to some (but probably not all) of the fatigue and shortness of breath she is feeling.  We discussed plan for treatment during her appointment.She should take an iron supplementation every other day.  Iron supplementation can be bought over the counter at any pharmacy (ferrous gluconate is typically the better tolerated form and comes as 27 mg or 38 mg of elemental iron - take 1 tablet every other day). Iron can cause constipation (which can be treated with miralax), darkening of stools, nausea, stomach cramps, and heartburn.  If she is unable to tolerate the oral supplementation, she should let us know so we can try to set up iron infusions. She has a follow up appointment in a few weeks and we can discuss next steps further at that time unless she has any specific questions now.     Her kidney function, liver function, thyroid function, and electrolytes like potassium are normal.  Her vitamin D level is low so she needs to restart the vitamin D supplement she was on previously.     Let me know if you feel like another family member should be called and updated too. We don't have a number on record for one of her daughters but they come to most of her appointments with her so if she wants one of them called, ask for their number please. Thanks!    Cielo Webster MD

## 2021-11-19 ENCOUNTER — OFFICE VISIT (OUTPATIENT)
Dept: PULMONOLOGY | Facility: CLINIC | Age: 81
End: 2021-11-19

## 2021-11-19 VITALS
OXYGEN SATURATION: 96 % | TEMPERATURE: 97.7 F | BODY MASS INDEX: 25.87 KG/M2 | HEIGHT: 63 IN | SYSTOLIC BLOOD PRESSURE: 120 MMHG | DIASTOLIC BLOOD PRESSURE: 72 MMHG | WEIGHT: 146 LBS | HEART RATE: 78 BPM

## 2021-11-19 DIAGNOSIS — I27.20 PULMONARY HTN (HCC): ICD-10-CM

## 2021-11-19 DIAGNOSIS — J90 PLEURAL EFFUSION: Primary | ICD-10-CM

## 2021-11-19 PROCEDURE — 99215 OFFICE O/P EST HI 40 MIN: CPT | Performed by: NURSE PRACTITIONER

## 2021-11-19 NOTE — PROGRESS NOTES
"[facility] Pulmonary Follow up    CHIEF COMPLAINT    Dyspnea    HISTORY OF PRESENT ILLNESS    Kenisha Ruvalcaba is a 81 y.o.female here today for follow-up.  She was last seen in the office per Dr. Shrestha at the end of October.  She was referred to our office for pulmonary hypertension and dyspnea.  She was found to have a large left pleural effusion and had a CT-guided thoracentesis performed on 11/5.  She had a chest x-ray performed today and is here to discuss the results of her thoracentesis as well.    She does not feel that these thoracentesis improved her breathing.  She is still short of breath with any exertion.  She is not walking that often during the day.  She states her activity has declined over the last year.  She states when she is walking she has pressure in her neck and gets leg weakness.    She was 88% on room air at rest.      She does have difficulty going to sleep at night and will take some naps during the day.  She states that she does not sleep well and has not slept well in many years.    She follows up with her PCP next week for her anemia.  She is unable to tolerate iron tablets and they are going to discuss iron infusions.    She also notices that her heart rate will be elevated at times especially with exertion.  She states her oxygen level does drop when her heart rate is elevated.    She denies fever, chills, sputum production, hemoptysis, night sweats, weight loss, chest pain or palpitations.  She denies any lower extremity edema or calf tenderness.  She denies any sinus or allergy symptoms.  She has noticed worsening reflux symptoms over the last year.  She usually will does drink a \"sip of Coke\" and this goes away.    She is up-to-date on her current vaccinations.  She is accompanied today by her daughter.    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)   • Left-sided carotid artery disease (HCC)   • Vertigo   • Anxiety   • Dyslipidemia   • Tachycardia-bradycardia syndrome (HCC)   • Chronic hip pain, left   • Primary osteoarthritis of left hip   • Status post total replacement of left hip   • Hypothyroid   • Prediabetes   • Leukocytosis, mild, likely reactive   • Pulmonary HTN (HCC)   • Heart valve disease       Allergies   Allergen Reactions   • Beta Adrenergic Blockers Dizziness     DIZZINESS    • Sulfa Antibiotics Rash     RASH        Current Outpatient Medications:   •  acetaminophen (TYLENOL) 325 MG tablet, Take 2 tablets by mouth Every 4 (Four) Hours As Needed for Mild Pain ., Disp: , Rfl:   •  aspirin 81 MG EC tablet, Take 81 mg by mouth Every Night., Disp: , Rfl:   •  cholecalciferol (VITAMIN D3) 1000 UNITS tablet, Take 1,000 Units by mouth Daily., Disp: , Rfl:   •  coenzyme Q10 100 MG capsule, Take 100 mg by mouth 2 (Two) Times a Week., Disp: , Rfl:   •  Eliquis 5 MG tablet tablet, TAKE 1 TABLET BY MOUTH TWICE A DAY, Disp: 60 tablet, Rfl: 5  •  furosemide (LASIX) 20 MG tablet, TAKE 1 TABLET BY MOUTH EVERY DAY AS NEEDED FOR EDEMA, Disp: 90 tablet, Rfl: 3  •  levothyroxine (SYNTHROID, LEVOTHROID) 50 MCG tablet, Take 50 mcg by mouth Every Morning., Disp: , Rfl:   •  losartan (COZAAR) 25 MG tablet, Take 1 tablet by mouth Every Night., Disp: 90 tablet, Rfl: 3  •  meclizine (ANTIVERT) 25 MG tablet, Take 25 mg by mouth 3 (Three) Times a Day As Needed for dizziness., Disp: , Rfl:   •  metoprolol succinate XL (TOPROL-XL) 100 MG 24 hr tablet, Take 0.5 tablets by mouth Daily., Disp: 30 tablet, Rfl: 6  •  PARoxetine (PAXIL) 10 MG tablet, Take 10 mg by mouth Every Night., Disp: , Rfl:   •  potassium chloride 10 MEQ CR tablet, TAKE 1 TABLET BY MOUTH EVERY DAY WITH FUROSEMIDE, Disp: 90 tablet, Rfl: 3  •  rosuvastatin (CRESTOR) 20 MG tablet, Take 20 mg by mouth 3 (Three) Times a Week. MON, WEDS, FRI, Disp: , Rfl:   •  sildenafil (REVATIO)  "20 MG tablet, TAKE ONE TABLET BY MOUTH THREE TIMES A DAY, Disp: 90 tablet, Rfl: 3  •  traZODone (DESYREL) 50 MG tablet, TAKE 1-2 TABLETS AT BEDTIME AS NEEDED INSOMNIA, Disp: , Rfl: 5  MEDICATION LIST AND ALLERGIES REVIEWED.    Social History     Tobacco Use   • Smoking status: Never Smoker   • Smokeless tobacco: Never Used   Vaping Use   • Vaping Use: Never used   Substance Use Topics   • Alcohol use: Yes     Alcohol/week: 1.0 standard drink     Types: 1 Glasses of wine per week     Comment: occas   • Drug use: No       FAMILY AND SOCIAL HISTORY REVIEWED.    Review of Systems   Constitutional: Positive for fatigue. Negative for activity change, appetite change, fever and unexpected weight change.   HENT: Negative for congestion, postnasal drip, rhinorrhea, sinus pressure, sore throat and voice change.    Eyes: Negative for visual disturbance.   Respiratory: Positive for shortness of breath. Negative for cough, chest tightness and wheezing.    Cardiovascular: Positive for palpitations. Negative for chest pain and leg swelling.   Gastrointestinal: Negative for abdominal distention, abdominal pain, nausea and vomiting.   Endocrine: Negative for cold intolerance and heat intolerance.   Genitourinary: Negative for difficulty urinating and urgency.   Musculoskeletal: Negative for arthralgias, back pain and neck pain.   Skin: Negative for color change and pallor.   Allergic/Immunologic: Negative for environmental allergies and food allergies.   Neurological: Negative for dizziness, syncope, weakness and light-headedness.   Hematological: Negative for adenopathy. Does not bruise/bleed easily.   Psychiatric/Behavioral: Negative for agitation and behavioral problems.   .    /72   Pulse 78   Temp 97.7 °F (36.5 °C)   Ht 160 cm (62.99\")   Wt 66.2 kg (146 lb)   LMP  (LMP Unknown)   SpO2 96% Comment: room air at rest  BMI 25.87 kg/m²     Immunization History   Administered Date(s) Administered   • COVID-19 (PFIZER) " 01/21/2021, 02/12/2021, 10/01/2021   • Tdap 06/03/2020       Physical Exam  Vitals and nursing note reviewed.   Constitutional:       Appearance: She is well-developed. She is not diaphoretic.   HENT:      Head: Normocephalic and atraumatic.   Eyes:      Pupils: Pupils are equal, round, and reactive to light.   Neck:      Thyroid: No thyromegaly.   Cardiovascular:      Rate and Rhythm: Normal rate and regular rhythm.      Heart sounds: Murmur heard.   No friction rub. No gallop.    Pulmonary:      Effort: Pulmonary effort is normal. No respiratory distress.      Breath sounds: Normal breath sounds. No wheezing or rales.   Chest:      Chest wall: No tenderness.   Abdominal:      General: Bowel sounds are normal.      Palpations: Abdomen is soft.      Tenderness: There is no abdominal tenderness.   Musculoskeletal:         General: No swelling. Normal range of motion.      Cervical back: Normal range of motion and neck supple.   Lymphadenopathy:      Cervical: No cervical adenopathy.   Skin:     General: Skin is warm and dry.      Capillary Refill: Capillary refill takes less than 2 seconds.   Neurological:      Mental Status: She is alert and oriented to person, place, and time.   Psychiatric:         Mood and Affect: Mood normal.         Behavior: Behavior normal.           RESULTS    Chest PA/lateral 11/19/2021: Official report pending    CT Chest Without Contrast Diagnostic    Result Date: 11/5/2021  No suspicious pulmonary nodularity concerning for lung cancer. There is what may represent a chronic pleural collection containing air and fluid near the left lung base with adjacent round atelectasis of the left lower lobe.  Finding of pneumothorax discussed with Dr. Her by Elmer Crabtree in person at 2:59 PM 11/5/2021   This report was finalized on 11/5/2021 3:04 PM by Elmer Crabtree.      US Thoracentesis    Addendum Date: 11/5/2021    The CT showed round atelectasis of the left lower lung along with  another loculated hydropneumothorax on the left side. To my eye there is also suggestion of pleural thickening at least along part of the surface of the underexpanded lingular lobe. This could represent an ex vacuo apparent hydropneumothorax postthoracentesis instead of a true pneumothorax related to air leak. This is likely explained by the inability of the chronically compressed lung along with a thickened pleural rind.  I had a discussion about the patient's findings with Dr. Shrestha. We agreed to monitor this elderly patient for an extended period of time in the recovery area instead of placing a chest tube that would require admission to the hospital in the current Covid-19 environment.  The patient was monitored without supplemental oxygen and maintain her O2 sats and low 90s, her baseline. She was able to walk at least 50 steps without desaturation. I spoke with the patient and the patient's daughters one of them was present in person and the other was over the speakerphone. We all agreed with the plan and to discharge the patient in the care of her daughter. If the patient has any symptoms such as shortness of breath and/or chest pain there would call or return to the ER.  Additionally, the patient looks pale with a low hemoglobin of 8.3 with a downward trend over the past 2 years. The patient has been advised to contact her PCP Dr. Shrestha regarding the anemia.  I spoke with Dr. Shrestha this evening to give him a status update.  The patient was discharged home in stable condition this evening from the  recovery area. She has a follow-up appointment with Dr. Shrestha in the near future.  This report was finalized on 11/5/2021 5:23 PM by Barrett Her MD.      Result Date: 11/5/2021  Impression:   Successful ultrasound-guided thoracentesis of left pleural effusion with recovery of 1.2 liters of pleural fluid.           The patient had significant chest pain and coughing during the procedure that  required injection of 50 mcg of fentanyl IV for pain control. After discussion with Dr. Shrestha, it was decided to stop the procedure. The patient was then transferred to the CT scanner for a chest CT as planned.  Thank you for the opportunity to assist in the care of your patient.  This report was finalized on 11/5/2021 1:14 PM by Barrett Her MD.      PROBLEM LIST    Problem List Items Addressed This Visit        Pulmonary and Pneumonias    Pulmonary HTN (HCC)    Overview     1. Echo 11-13-19: . RVSP from tricuspid regurgitation is markedly elevated (>55 mmHg). Calculated right ventricular systolic pressure from tricuspid regurgitation is 95 mmHg.         Relevant Orders    Ambulatory Referral to Pulmonology (Completed)      Other Visit Diagnoses     Pleural effusion    -  Primary    Relevant Orders    XR Chest PA & Lateral            DISCUSSION    Ms. Ruvalcaba was here for follow-up after she had a thoracentesis performed earlier this month.  She has not noticed any significant improvement in her breathing since thoracentesis.  They removed 1.2 liters during the procedure.  We did review the chest x-ray that was performed today and the official report is pending.  I will notify her if there are any abnormalities.    She attempted to do a 6-minute walk test but was unable to walk but 2 laps in the hallway and had to quit due to neck pain and severe dyspnea.  Her oxygenation never dropped during the testing.    I would like to start her on oxygen due to her severe pulmonary hypertension and we will try to get this ordered through her local [employer] company    We reviewed her thoracentesis labs and cultures and everything is up-to-date negative.  We will continue to follow the fungal cultures for 6 weeks.    We did discuss that her pulmonary hypertension is probably contributing to her shortness of breath and she is agreeable to a referral to the pulmonary hypertension clinic at .  I did place this referral  today.    I do suspect that GERD could be playing a role in her symptoms as well.  I did encourage her to take the omeprazole over-the-counter once a day until her next appointment with Dr. Shrestha in December.    She is going to follow-up with her PCP next week to discuss treating her anemia and anxiety.  She feels that her anxiety has worsened and this also makes her more short of breath as well.    We did discuss doing some daily physical activity and she is going to purchase a NuStep and do this every day until seen by Dr. Shrestha in December.  She has become rather inactive at home.    I did discuss these plans with 1 daughter in the office today and one on the phone in the office today.  She is going to keep her follow-up appointment in December and do full pulmonary function testing at that time.    Level of service justified based on 48 minutes spent in patient care on this date of service including, but not limited to: preparing to see the patient, obtaining and/or reviewing history, performing medically appropriate examination, ordering tests/medicine/procedures, independently interpreting results, documenting clinical information in EHR, and counseling/education of patient/family/caregiver. (Level 4 30-39 minutes; Level 5 40-54 minutes)      Anu Guzman, APRN  11/19/202112:46 EST  Electronically signed     Please note that portions of this note were completed with a voice recognition program.        CC: Nereida Webster MD

## 2021-11-23 PROBLEM — F39 MOOD DISORDER (HCC): Status: ACTIVE | Noted: 2021-01-01

## 2021-11-23 PROBLEM — T45.4X5A ADVERSE EFFECT OF IRON: Status: ACTIVE | Noted: 2021-01-01

## 2021-11-23 PROBLEM — D50.0 IRON DEFICIENCY ANEMIA DUE TO CHRONIC BLOOD LOSS: Status: ACTIVE | Noted: 2021-01-01

## 2021-11-23 NOTE — PROGRESS NOTES
Internal Medicine Established Office Visit      Date: 2021     Patient Name: Kenisha Ruvalcaba  : 1940   MRN: [id number]     Chief Complaint:    Chief Complaint   Patient presents with   • Follow-up   • Anemia   • Nausea       History of Present Illness: Kenisha Ruvalcaba is a 81 y.o. female who presents to clinic today for follow-up for iron deficiency anemia.  There is no obvious cause of iron deficiency.  There does not seem to be an issue with dietary deficiency or decreased absorption.  The most likely cause is occult blood loss, most likely via the GI tract though her last colonoscopy was about 4 years ago and was reportedly normal.  She takes Eliquis 5 mg twice daily and ASA 81 mg daily.    She notes continued dyspnea which causes anxiety which results in more dyspnea.  She was seen by her pulmonologist recently who has decided to start oxygen and referred her to a pulmonary hypertension specialist.  She was unable to complete a 6-minute walk due to fatigue.    She was recently started on omeprazole 40 mg due to reflux symptoms.  She continues to have nausea believed to be due to this.  She denies melena and hematochezia but gastritis from GERD may be causing occult blood loss.    Subjective     I have reviewed and the following portions of the patient's history were updated as appropriate: past family history, past medical history, past social history, past surgical history and problem list.     Medications:     Current Outpatient Medications:   •  acetaminophen (TYLENOL) 325 MG tablet, Take 2 tablets by mouth Every 4 (Four) Hours As Needed for Mild Pain ., Disp: , Rfl:   •  aspirin 81 MG EC tablet, Take 81 mg by mouth Every Night., Disp: , Rfl:   •  cholecalciferol (VITAMIN D3) 1000 UNITS tablet, Take 1,000 Units by mouth Daily., Disp: , Rfl:   •  coenzyme Q10 100 MG capsule, Take 100 mg by mouth 2 (Two) Times a Week., Disp: , Rfl:   •  Eliquis 5 MG tablet tablet, TAKE 1 TABLET BY MOUTH TWICE A  "DAY, Disp: 60 tablet, Rfl: 5  •  furosemide (LASIX) 20 MG tablet, TAKE 1 TABLET BY MOUTH EVERY DAY AS NEEDED FOR EDEMA, Disp: 90 tablet, Rfl: 3  •  levothyroxine (SYNTHROID, LEVOTHROID) 50 MCG tablet, Take 50 mcg by mouth Every Morning., Disp: , Rfl:   •  meclizine (ANTIVERT) 25 MG tablet, Take 25 mg by mouth 3 (Three) Times a Day As Needed for dizziness., Disp: , Rfl:   •  metoprolol succinate XL (TOPROL-XL) 100 MG 24 hr tablet, Take 0.5 tablets by mouth Daily., Disp: 30 tablet, Rfl: 6  •  PARoxetine (PAXIL) 20 MG tablet, , Disp: , Rfl:   •  potassium chloride 10 MEQ CR tablet, TAKE 1 TABLET BY MOUTH EVERY DAY WITH FUROSEMIDE, Disp: 90 tablet, Rfl: 3  •  rosuvastatin (CRESTOR) 20 MG tablet, Take 20 mg by mouth 3 (Three) Times a Week. MON, WEDS, FRI, Disp: , Rfl:   •  sildenafil (REVATIO) 20 MG tablet, TAKE ONE TABLET BY MOUTH THREE TIMES A DAY, Disp: 90 tablet, Rfl: 3  •  traZODone (DESYREL) 50 MG tablet, TAKE 1-2 TABLETS AT BEDTIME AS NEEDED INSOMNIA, Disp: , Rfl: 5  •  busPIRone (BUSPAR) 5 MG tablet, Take 2 tablets by mouth 2 (Two) Times a Day., Disp: 120 tablet, Rfl: 2  •  ondansetron (Zofran) 4 MG tablet, Take 1 tablet by mouth Every 8 (Eight) Hours As Needed for Nausea or Vomiting., Disp: 9 tablet, Rfl: 3    Allergies:   Allergies   Allergen Reactions   • Beta Adrenergic Blockers Dizziness     DIZZINESS    • Sulfa Antibiotics Rash     RASH        Objective     Physical Exam:   Vital Signs:   Vitals:    11/23/21 0826   BP: 122/68   Pulse: 78   Temp: 97.9 °F (36.6 °C)   SpO2: 91%   Weight: 66.4 kg (146 lb 4.8 oz)   Height: 160 cm (62.99\")   PainSc: 0-No pain     Body mass index is 25.92 kg/m².     Physical Exam  Vitals and nursing note reviewed.   Constitutional:       General: She is not in acute distress.     Appearance: Normal appearance. She is not toxic-appearing.   HENT:      Head: Normocephalic and atraumatic.   Cardiovascular:      Rate and Rhythm: Normal rate and regular rhythm.   Pulmonary:      " Effort: Pulmonary effort is normal.      Breath sounds: Normal breath sounds.      Comments: No respiratory distress but does appear short of breath  Abdominal:      General: Abdomen is flat.      Palpations: Abdomen is soft.   Skin:     General: Skin is warm and dry.   Neurological:      Mental Status: She is alert.   Psychiatric:         Mood and Affect: Mood normal.         Behavior: Behavior normal.          Assessment / Plan      Assessment/Plan:   Diagnoses and all orders for this visit:    1. Iron deficiency anemia due to chronic blood loss (Primary)  Most likely cause is occult bleeding, possibly from UGI with reflux symptoms.  Will discuss referral to GI for further evaluation. She may not be a great candidate for procedures requiring sedation given her severe pHTN. As she could not tolerate PO iron, will order IV iron infusions.     2. Pulmonary HTN (HCC)  Referred to pHTN specialist at  to discuss more advanced therapies as Sildenafil is no longer improving symptoms.  Discussed severity of diagnosis and concerns regarding continued symptoms. Advanced care discussion initiated and she notes that should she require intubation in the future, this is not something she would want.  Her daughter is present in the room and understands this.  Ms. De will discuss this with remainder of family.  She has no formal advanced directive and was provided with information on how to begin this process. Will re-evaluate this at next appointment in 3 weeks for formal code status discussion after she has discussed with her family.     3. Advanced care planning/counseling discussion  See above.     4. Gastroesophageal reflux disease without esophagitis  Will discuss referral to GI to discuss this as possible cause of occult bleeding resulting in iron deficiency. Continue omeprazole 40 mg daily. Zofran prescription provided for nausea.     5. Mood disorder (HCC)  Anxiety worsened by shortness of breath. Discussed why as  needed medications like benzodiazepines are not a great choice with her level of dyspnea and hypoxia.  Buspirone 10 mg BID prescribed.  Recommended daily use for now and may be able to take on as needed basis in the future. Would also consider SSRI at next appointment    6. Paroxysmal atrial fibrillation (HCC)  Eliquis 5 mg BID for this which may be causing occult bleed resulting in iron deficiency.       Follow Up:   Return in about 3 weeks (around 12/14/2021) for Recheck.    Time:  I spent approximately 45 minutes providing clinical care for this patient; including review of patient's chart and provider documentation, face to face time spent with patient in examination room (obtaining history, performing physical exam, discussing diagnosis and management options), placing orders, and completing patient documentation.     Multiple chronic conditions assessed (both stable and uncontrolled) with prescription drug management completed consistent with moderate MDM.      Addendum (11/30/2021):  Informed that I do not have privileges for iron infusions at [facility]. Will refer to hematologist to have infusions set up.  Called and left voicemail with daughter Montana, and sent message via [employer] to Ms. De about this.     Nereida Webster MD  [facility] [place]

## 2021-11-29 NOTE — TELEPHONE ENCOUNTER
Caller: Roshan Lopez    Relationship: Emergency Contact    Best call back number: [phone]    Who are you requesting to speak with (clinical staff, provider,  specific staff member): CLINICAL STAFF    What was the call regarding: ROSHAN IS FOLLOWING UP ON THE PATIENT'S IRON INFUSIONS. ROSHAN WOULD LIKE TO KNOW IF THIS IS READY TO BE SCHEDULED.    Do you require a callback: YES

## 2021-12-01 NOTE — TELEPHONE ENCOUNTER
Pt daughter Montana called; stated she received Dr. Webster's messages & will have her phone w/ her & be ready when she calls back this afternoon.

## 2021-12-01 NOTE — TELEPHONE ENCOUNTER
"Left voicemail with Ms. De and with her daughter Montana to give the office a call back about iron infusions.  I have also sent a message via [employer]:   \"I was informed this morning that I do not have privileges to order iron infusions at our center. I think it took so long to get this information because of the holiday.  I'm sorry for the confusion about this.  I assumed that since I could do it at my last job, that it was something I could do at [facility] [place] as well. The next step will be to see a physician that can order the iron infusion so I will refer you to hematology (this is a blood doctor who deals with complex anemia). I will put this in as urgent  to see if I can hurry the referral along. I called Montana and left a message with her about this too. Please reach out to the office or send me a message back with any other questions or concerns ([facility] at [phone]).  Again, I am so sorry about the confusion and I will do what I can to get you in to see the hematologist in a timely manner. I hope you had a great Thanksgiving!\"     "

## 2021-12-02 NOTE — TELEPHONE ENCOUNTER
Spoke with Dr Downs she is willing to place order so that pt is able to complete. Per Bina order will need to be placed and then she will call to schedule with [facility]fusion [facility].

## 2021-12-15 NOTE — PROGRESS NOTES
Internal Medicine Established Office Visit      Date: 12/15/2021     Patient Name: Kenisha Ruvalcaba  : 1940   MRN: [id number]     Chief Complaint:    Chief Complaint   Patient presents with   • Follow-up     Anemia, fatigue       History of Present Illness: Kenisha Ruvalcaba is a 81 y.o. female who presents to clinic today for follow-up.  She has received 2 IV infusions of iron.  She does feel like she has had more energy and increased appetite after the infusions but no improvement in shortness of breath.  She has constipation with no bowel movement for 1 week.  This may be due to attempting oral iron replacement.  She has associated abdominal pain and nausea.  She is not currently taking anything for constipation.  After each iron infusion she noted some lower extremity swelling and orange urine.  Her oxygen level was low on arrival and she did not have her portable oxygen with her.  Oxygen level improved to 90% after rest.  She notes dry nose due to using oxygen.  She has a rash with satellite lesions beneath her left breast.  She also has a blister on her inner right thigh.    Subjective     I have reviewed and the following portions of the patient's history were updated as appropriate: past family history, past medical history, past social history, past surgical history and problem list.     Medications:     Current Outpatient Medications:   •  acetaminophen (TYLENOL) 325 MG tablet, Take 2 tablets by mouth Every 4 (Four) Hours As Needed for Mild Pain ., Disp: , Rfl:   •  aspirin 81 MG EC tablet, Take 81 mg by mouth Every Night., Disp: , Rfl:   •  busPIRone (BUSPAR) 5 MG tablet, Take 2 tablets by mouth 2 (Two) Times a Day., Disp: 120 tablet, Rfl: 2  •  cholecalciferol (VITAMIN D3) 1000 UNITS tablet, Take 1,000 Units by mouth Daily., Disp: , Rfl:   •  Eliquis 5 MG tablet tablet, TAKE 1 TABLET BY MOUTH TWICE A DAY, Disp: 60 tablet, Rfl: 5  •  furosemide (LASIX) 20 MG tablet, TAKE 1 TABLET BY MOUTH EVERY DAY  "AS NEEDED FOR EDEMA, Disp: 90 tablet, Rfl: 1  •  levothyroxine (SYNTHROID, LEVOTHROID) 50 MCG tablet, Take 50 mcg by mouth Every Morning., Disp: , Rfl:   •  meclizine (ANTIVERT) 25 MG tablet, Take 25 mg by mouth 3 (Three) Times a Day As Needed for dizziness., Disp: , Rfl:   •  metoprolol succinate XL (TOPROL-XL) 100 MG 24 hr tablet, Take 0.5 tablets by mouth Daily., Disp: 30 tablet, Rfl: 6  •  ondansetron (Zofran) 4 MG tablet, Take 1 tablet by mouth Every 8 (Eight) Hours As Needed for Nausea or Vomiting., Disp: 9 tablet, Rfl: 3  •  potassium chloride 10 MEQ CR tablet, TAKE 1 TABLET BY MOUTH EVERY DAY WITH FUROSEMIDE, Disp: 90 tablet, Rfl: 3  •  rosuvastatin (CRESTOR) 20 MG tablet, Take 20 mg by mouth 3 (Three) Times a Week. MON, WEDS, FRI, Disp: , Rfl:   •  sildenafil (REVATIO) 20 MG tablet, TAKE ONE TABLET BY MOUTH THREE TIMES A DAY, Disp: 90 tablet, Rfl: 3  •  traZODone (DESYREL) 50 MG tablet, TAKE 1-2 TABLETS AT BEDTIME AS NEEDED INSOMNIA, Disp: , Rfl: 5  •  coenzyme Q10 100 MG capsule, Take 100 mg by mouth 2 (Two) Times a Week., Disp: , Rfl:   •  nystatin (MYCOSTATIN) [id number] UNIT/GM powder, Apply  topically to the appropriate area as directed 3 (Three) Times a Day. Use until rash has improved., Disp: 30 g, Rfl: 1  •  PARoxetine (PAXIL) 20 MG tablet, , Disp: , Rfl:     Allergies:   Allergies   Allergen Reactions   • Beta Adrenergic Blockers Dizziness     DIZZINESS    • Sulfa Antibiotics Rash     RASH        Objective     Physical Exam:   Vital Signs:   Vitals:    12/15/21 1524   BP: 116/54   Pulse: 83   Resp: 16   Temp: 96.8 °F (36 °C)   SpO2: 90%   Weight: 71.6 kg (157 lb 12.8 oz)   Height: 157.5 cm (62.01\")   PainSc: 0-No pain     Body mass index is 28.85 kg/m².     Physical Exam  Vitals and nursing note reviewed.   Constitutional:       Appearance: Normal appearance.   Cardiovascular:      Rate and Rhythm: Normal rate and regular rhythm.      Heart sounds: Normal heart sounds.   Pulmonary:      Effort: " Pulmonary effort is normal.      Breath sounds: No wheezing, rhonchi or rales.      Comments: Decreased breath sounds over LLL.   Skin:     General: Skin is warm and dry.      Findings: Rash (Noted beneath left breast with satellite lesions) present.   Neurological:      Mental Status: She is alert.   Psychiatric:         Mood and Affect: Mood normal.         Behavior: Behavior normal.       Assessment / Plan      Assessment/Plan:   Diagnoses and all orders for this visit:    1. Iron deficiency anemia due to chronic blood loss (Primary)  Received 2 IV infusions of iron.  Will recheck CBC and iron panel at next appointment in 1 month.  She notes some improvement in appetite and energy level but no improvement in shortness of breath.  BLE edema likely due to fluid involved with IV infusions.  Orange urine that she noted after infusion is also likely due to iron as some of it is excreted through the kidneys.  Discussed causes of iron deficiency including dietary deficiency, malabsorption, and blood loss.  Unlikely to be dietary deficiency and no symptoms of malabsorption.  Likely due to occult blood cause most likely from GI source.  Due to severe pulmonary hypertension she is unlikely to tolerate endoscopy.  Will see how she is doing at her next appointment with CBC and iron panel.  If not adequately improved, will refer to GI to discuss likely source knowing that endoscopy may not be an option for her.  Currently on Eliquis 5 mg twice daily for paroxysmal atrial fibrillation.  Risks and benefits of continuing or stopping the medication can be discussed at that time too.    2. Candidal intertrigo  Beneath left breast.  Nystatin powder prescribed to use 3 times daily until rash improves.  Powder best choice because the area is damp.  Also recommended keeping this area dry and keeping a piece of cotton or gauze beneath her breast against the skin.    3. Paroxysmal atrial fibrillation (HCC)  Currently on Eliquis 5 mg  twice daily.  May be contributing to occult bleeding, possibly from the GI tract. Risks and benefits of continuing or stopping the medication can be discussed based on repeat CBC and iron panel.    4. Pulmonary HTN (HCC)  Continues to be short of breath.  Has oxygen at home and portable oxygen but did not bring it with her today.  She has an appointment with Dr. Resendez (pulmonary hypertension specialist at ) scheduled for 12/16/2021.    5. Drug-induced constipation  Likely due to trial of p.o. iron.  No bowel movement in 1 week likely resulting in abdominal pain and nausea/vomiting.  Recommended daily MiraLAX for the next few days.  Sometimes it takes up to 3 days to have an effect.  If no bowel movement after 3 days, recommended Dulcolax suppository.    6. Blister  Small blister on right inner thigh appears to be due to friction.  Recommended gauze or bandage over the area to prevent worsening due to friction.       Follow Up:   Return in about 4 weeks (around 1/12/2022) for Recheck.    Time:  I spent approximately 30 minutes providing clinical care for this patient; including review of patient's chart and provider documentation, face to face time spent with patient in examination room (obtaining history, performing physical exam, discussing diagnosis and management options), placing orders, and completing patient documentation.     Addendum (12/17/2021): Patient assessed by Dr. Resendez (pHTN specialist at ). Due to the type of pHTN she has, he is unable to provide treatment but recommended increasing diuresis to keep her volume negative. He increased her Lasix dose to 40 mg and potassium dose to 20 mg daily. Dosage was changed to reflect this on chart. He recommended BMP in 1 week after dose change. Will place order for BMP and magnesium to be obtained. Will also have a follow up appointment made to discuss results. Sildenafil was stopped. Will obtain records to review.     Nereida Webster MD  [facility]  [place]

## 2021-12-16 NOTE — TELEPHONE ENCOUNTER
THE PATIENTS DAUGHTER ROSHAN STATES THAT SHE WAS SEEN YESTERDAY AND THE DOCTOR WAS GOING TO CALL IN [facility] POWDER THE CALLER WOULD LIKE TO KNOW IF THAT WAS SENT TO [facility] IN [place] THE CALLER IS CONCERNED THAT IT MAY HAVE ACCIDENTALLY BEEN SENT TO [facility]. [facility] IS STILL SAYING THAT THEY DO NOT HAVE THE PRESCRIPTION.    CALL [phone]

## 2021-12-20 NOTE — PROGRESS NOTES
"CC:    Shortness of breath    HPI:    80 y/o WF w/ h/o lifetime non-smoking, allergic rhinitis, GERD, Afib / flutter, prior ablation, PPM, HFpEF EF 65%, VHD - moderate AI / mild MR / moderate to severe TR, Group 2 Pulmonary HTN [facility] 1/30/20:  RA 12 Mean PA 52, PCWP 36, CO 3.37, PVR 4.74 RIBEIRO.  Mean PA dropped to 37 w/ Leticia.  She was started on Revatio and did notice a subjective improvement.  Patient is referred by Dr. Sosa for the evaluation of increased shortness of breath.  She has had frequent falls.  Last year fell and likely broke some left sided ribs.  Mainly has GARVEY with minimal activity and chest pressure.  No PND / Orthopnea.  Trace LE edema.  No exertional light headedness or dizziness.    INTERVAL HISTORY:    Patient had thoracentesis on left side 11/5/21 with drainage of 1.2 liters of fluid.  Post procedure noted to have pneumothorax ex vacuo consistent with trapped lung.  Pleural fluid studies showed a bloody, lymphocytic transudate.  Cultures negative.  Cytology showed \"Negative for malignancy.  Chronic inflammatory cells and benign mesothelial cells within a serous background.\"    Patient was evaluated by Dr. Resendez at the  Pulmonary HTN clinic.  Taken off revatio and being diuresed for Group II Pulmonary HTN.    PMH:    Past Medical History:   Diagnosis Date   • Anesthesia complication     HARD TO WAKE, Hallucinations, Agitation    • Anxiety    • Broken arm 08/30/2019    left upper arm   • Broken nose 08/30/2019   • Chronic hip pain, left 1/7/2019   • Dyslipidemia    • Fall 08/30/2019   • History of cardioversion 12/03/2010    initiation of Tambocor and subsequent external cardioversion.   • ÁNGELA (obstructive sleep apnea)    • Paroxysmal atrial fibrillation (HCC)    • Vertigo    • Wears glasses      PSH:    Past Surgical History:   Procedure Laterality Date   • BREAST BIOPSY Left     MARKER IN PLACE   • CARDIAC CATHETERIZATION Left 6/16/2016    Procedure: Cardiac catheterization;  Surgeon: Chuck" Jose Clark MD;  Location:  [facility];  Service:    • CARDIAC CATHETERIZATION N/A 1/30/2020    Procedure: Right Heart Cath;  Surgeon: Sang Sosa MD;  Location:  [facility];  Service: Cardiology   • CARDIAC ELECTROPHYSIOLOGY PROCEDURE N/A 2/17/2017    Procedure: Pacemaker DC new;  Surgeon: Darryl Swenson MD;  Location:  [facility] EP INVASIVE LOCATION;  Service:    • CARDIAC ELECTROPHYSIOLOGY PROCEDURE N/A 4/8/2019    Procedure: Ablation atrial fibrillation, hold Tikosyn for 5 days stay on metoprolol.;  Surgeon: Darryl Swenson MD;  Location:  [facility] EP INVASIVE [facility];  Service: Cardiovascular   • CAROTID ENDARTERECTOMY Left    • CATARACT EXTRACTION Bilateral    • COLONOSCOPY      every 5-10 years. does not plan to have anymore   • OTHER SURGICAL HISTORY      left lung was drained on 11/5/21   • PACEMAKER IMPLANTATION     • TOTAL HIP ARTHROPLASTY Left 1/30/2019    Procedure: TOTAL HIP ARTHROPLASTY LEFT;  Surgeon: Freddie Vale MD;  Location: [facility] OR;  Service: Orthopedics   • TUBAL ABDOMINAL LIGATION       FH:    Family History   Problem Relation Age of Onset   • Alzheimer's disease Mother    • Cancer Mother    • Aortic aneurysm Father    • Heart valve disorder Sister      SH:    Social History     Socioeconomic History   • Marital status:    Tobacco Use   • Smoking status: Never Smoker   • Smokeless tobacco: Never Used   Vaping Use   • Vaping Use: Never used   Substance and Sexual Activity   • Alcohol use: Yes     Alcohol/week: 1.0 standard drink     Types: 1 Glasses of wine per week     Comment: occas   • Drug use: No   • Sexual activity: Defer     ALLERGIES:    Allergies   Allergen Reactions   • Beta Adrenergic Blockers Dizziness     DIZZINESS    • Sulfa Antibiotics Rash     RASH      MEDICATIONS:      Current Outpatient Medications:   •  acetaminophen (TYLENOL) 325 MG tablet, Take 2 tablets by mouth Every 4 (Four) Hours As Needed for Mild Pain .,  Disp: , Rfl:   •  aspirin 81 MG EC tablet, Take 81 mg by mouth Every Night., Disp: , Rfl:   •  busPIRone (BUSPAR) 5 MG tablet, Take 2 tablets by mouth 2 (Two) Times a Day., Disp: 120 tablet, Rfl: 2  •  Eliquis 5 MG tablet tablet, TAKE 1 TABLET BY MOUTH TWICE A DAY, Disp: 60 tablet, Rfl: 5  •  furosemide (LASIX) 20 MG tablet, Take 2 tablets by mouth Daily., Disp: 90 tablet, Rfl: 1  •  levothyroxine (SYNTHROID, LEVOTHROID) 50 MCG tablet, Take 50 mcg by mouth Every Morning., Disp: , Rfl:   •  meclizine (ANTIVERT) 25 MG tablet, Take 25 mg by mouth 3 (Three) Times a Day As Needed for dizziness., Disp: , Rfl:   •  metoprolol succinate XL (TOPROL-XL) 100 MG 24 hr tablet, Take 0.5 tablets by mouth Daily., Disp: 30 tablet, Rfl: 6  •  ondansetron (Zofran) 4 MG tablet, Take 1 tablet by mouth Every 8 (Eight) Hours As Needed for Nausea or Vomiting., Disp: 9 tablet, Rfl: 3  •  PARoxetine (PAXIL) 20 MG tablet, , Disp: , Rfl:   •  potassium chloride 10 MEQ CR tablet, Take 2 tablets by mouth Daily., Disp: 90 tablet, Rfl: 3  •  rosuvastatin (CRESTOR) 20 MG tablet, Take 20 mg by mouth 3 (Three) Times a Week. MON, WEDS, FRI, Disp: , Rfl:   •  traMADol (ULTRAM) 50 MG tablet, Take 50 mg by mouth Every 6 (Six) Hours As Needed., Disp: , Rfl:   •  traZODone (DESYREL) 50 MG tablet, TAKE 1-2 TABLETS AT BEDTIME AS NEEDED INSOMNIA, Disp: , Rfl: 5  •  cholecalciferol (VITAMIN D3) 1000 UNITS tablet, Take 1,000 Units by mouth Daily., Disp: , Rfl:   •  coenzyme Q10 100 MG capsule, Take 100 mg by mouth 2 (Two) Times a Week., Disp: , Rfl:   •  nystatin (MYCOSTATIN) [id number] UNIT/GM powder, Apply  topically to the appropriate area as directed 3 (Three) Times a Day. Use until rash has improved., Disp: 30 g, Rfl: 1  ROS:  Per HPI, otherwise all systems reviewed and negative.    DIAGNOSTIC DATA (Reviewed and interpreted by me unless otherwise specified):    CXR 10/27/21 - moderate to large left sided pleural effusion which is new from last CXR  6/3/20    CXR 12/20/21 - left sided effusion is smaller compared to 10/27/21.  Still has at least small bilateral effusions.    PFT 12/20/21 no obstruction, low FVC suggestive of restriction - poor effort    Vitals:    12/20/21 1045   BP: 116/68   Pulse: 54   Resp: 16   Temp: 97.5 °F (36.4 °C)   SpO2: (!) 87%       Physical Exam   Constitutional: Oriented to person, place, and time. Appears well-developed and well-nourished.   Head: Normocephalic and atraumatic.   Nose: Nose normal.   Mouth/Throat: Oropharynx is clear and moist.   Eyes: Conjunctivae are normal.  Pupils normal.  Neck: No tracheal deviation present.   Cardiovascular: Normal rate, regular rhythm, normal heart sounds and intact distal pulses.  Exam reveals no gallop and no friction rub.  No thrill.  No JVD. 3-4+ edema.  No murmur heard.  Pulmonary/Chest: Effort normal and breath sounds normal.  No tenderness to palpation.  No clubbing.   Abdominal: Soft. Bowel sounds are normal. No distension. No tenderness. There is no guarding.   Musculoskeletal: Normal range of motion.  No tenderness.  Lymphadenopathy:  No cervical adenopathy.   Neurological:  No new focal neurological deficits observed   Skin: Skin is warm and dry. No rash noted.   Psychiatric: Normal mood and affect.  Behavior is normal. Judgment normal.    Assessment/Plan     Dyspnea on Exertion  Left Pleural Effusion s/p Thoracentesis 11/5/21  Severe Group 2 Pulmonary HTN  HFpEF  VHD - mod AI / mild MR / sev TR  Afib / SSS / PPM on Eliquis  Had very short term minimal improvement after thoracentesis. Quite edematous today.  Pleural effusions are smaller and more symmetrical.  I think all of her symptoms are secondary to CHF / VHD / Group II Pulmonary HTN.  I agree with Dr. Resendez's assessment patient needs optimal diuresis to maintain euvolemia.  This is being managed by her PCP.  I don't think repeat thoracentesis or pleurx will improve her quality of life.  Not a good VATS / pleurodesis  candidate.  Continue O2 hs and prn.    RTC ~6 months w/ 6MW and CXR    Marcos Shrestha MD  Pulmonology and Critical Care Medicine  12/20/21 12:08 EST  Electronically Signed    C.C.:  No ref. provider found, Nereida Webster MD

## 2021-12-22 NOTE — PROGRESS NOTES
Internal Medicine Established Office Visit      Date: 2021     Patient Name: Kenisha Ruvalcaba  : 1940   MRN: [id number]     Chief Complaint:    Chief Complaint   Patient presents with   • Follow-up   • Anemia       History of Present Illness: Kenisha Ruvalcaba is a 81 y.o. female who presents to clinic today for follow up. She was evalauted by pulmonary HTN specialist to noted her pulmonary hypertension was due to left-sided heart failure (HFpEF). He increased her Lasix dose to 40 mg twice daily and potassium dose to 20 mEq/day. She has been doing better since this change about a week ago and feels that breathing has somewhat improved. She continues to have some lower extremity edema. This worsened after her last iron infusion but is improving.    The rash underneath her left breast resolved with nystatin powder.    She has been using MiraLAX daily and had several bowel movements that she describes as liquid. She continues to have some abdominal pain but notes that it has somewhat improved.    Subjective     I have reviewed and the following portions of the patient's history were updated as appropriate: past family history, past medical history, past social history, past surgical history and problem list.     Medications:     Current Outpatient Medications:   •  acetaminophen (TYLENOL) 325 MG tablet, Take 2 tablets by mouth Every 4 (Four) Hours As Needed for Mild Pain ., Disp: , Rfl:   •  aspirin 81 MG EC tablet, Take 81 mg by mouth Every Night., Disp: , Rfl:   •  busPIRone (BUSPAR) 5 MG tablet, Take 2 tablets by mouth 2 (Two) Times a Day., Disp: 120 tablet, Rfl: 2  •  cholecalciferol (VITAMIN D3) 1000 UNITS tablet, Take 1,000 Units by mouth Daily., Disp: , Rfl:   •  coenzyme Q10 100 MG capsule, Take 100 mg by mouth 2 (Two) Times a Week., Disp: , Rfl:   •  Eliquis 5 MG tablet tablet, TAKE 1 TABLET BY MOUTH TWICE A DAY, Disp: 60 tablet, Rfl: 5  •  furosemide (LASIX) 20 MG tablet, Take 2 tablets by mouth  "Daily., Disp: 90 tablet, Rfl: 1  •  levothyroxine (SYNTHROID, LEVOTHROID) 50 MCG tablet, Take 50 mcg by mouth Every Morning., Disp: , Rfl:   •  meclizine (ANTIVERT) 25 MG tablet, Take 25 mg by mouth 3 (Three) Times a Day As Needed for dizziness., Disp: , Rfl:   •  metoprolol succinate XL (TOPROL-XL) 100 MG 24 hr tablet, Take 0.5 tablets by mouth Daily., Disp: 30 tablet, Rfl: 6  •  nystatin (MYCOSTATIN) [id number] UNIT/GM powder, Apply  topically to the appropriate area as directed 3 (Three) Times a Day. Use until rash has improved., Disp: 30 g, Rfl: 1  •  ondansetron (Zofran) 4 MG tablet, Take 1 tablet by mouth Every 8 (Eight) Hours As Needed for Nausea or Vomiting., Disp: 9 tablet, Rfl: 3  •  PARoxetine (PAXIL) 20 MG tablet, , Disp: , Rfl:   •  potassium chloride 10 MEQ CR tablet, Take 2 tablets by mouth Daily., Disp: 90 tablet, Rfl: 3  •  rosuvastatin (CRESTOR) 20 MG tablet, Take 20 mg by mouth 3 (Three) Times a Week. MON, WEDS, FRI, Disp: , Rfl:   •  traMADol (ULTRAM) 50 MG tablet, Take 50 mg by mouth Every 6 (Six) Hours As Needed., Disp: , Rfl:   •  traZODone (DESYREL) 50 MG tablet, TAKE 1-2 TABLETS AT BEDTIME AS NEEDED INSOMNIA, Disp: , Rfl: 5    Allergies:   Allergies   Allergen Reactions   • Beta Adrenergic Blockers Dizziness     DIZZINESS    • Sulfa Antibiotics Rash     RASH        Objective     Physical Exam:   Vital Signs:   Vitals:    12/22/21 1312   BP: 118/62   Pulse: 62   Temp: 98.2 °F (36.8 °C)   SpO2: 95%   Weight: 72.7 kg (160 lb 3.2 oz)   Height: 157.5 cm (62.01\")   PainSc: 0-No pain     Body mass index is 29.29 kg/m².     Physical Exam  Vitals and nursing note reviewed.   Constitutional:       General: She is not in acute distress.     Appearance: Normal appearance. She is not ill-appearing or toxic-appearing.   Cardiovascular:      Rate and Rhythm: Normal rate.   Pulmonary:      Effort: Pulmonary effort is normal.   Musculoskeletal:      Right lower leg: Edema present.      Left lower leg: Edema " present.   Skin:     General: Skin is warm and dry.   Neurological:      Mental Status: She is alert.   Psychiatric:         Mood and Affect: Mood normal.         Behavior: Behavior normal.          Assessment / Plan      Assessment/Plan:   Diagnoses and all orders for this visit:    1. Pulmonary HTN (HCC) (Primary)  Chronic with exacerbation. Continue Lasix 40 mg twice daily with potassium supplement 20 mEq daily. BMP ordered to assess renal function and potassium with this new dose change. We will also obtain LFTs since abdominal pain could also be related to congestive hepatopathy. Patient to weigh herself daily so that we can monitor progress.    2. Drug-induced constipation  Likely due to previously taking PO iron. She has had small liquid bowel movements that may be paradoxical overflow around impacted stool. Abdominal x-ray ordered.    Follow Up:   Return for Next scheduled follow up.    Time:  I spent approximately 25 minutes providing clinical care for this patient; including review of patient's chart and provider documentation, face to face time spent with patient in examination room (obtaining history, performing physical exam, discussing diagnosis and management options), placing orders, and completing patient documentation.     One chronic condition with exacerbation assessed with prescription drug management completed consistent with moderate MDM.    Nereida Webster MD  [facility] [place]

## 2021-12-23 PROBLEM — N17.9 AKI (ACUTE KIDNEY INJURY) (HCC): Status: ACTIVE | Noted: 2021-01-01

## 2021-12-23 NOTE — PROGRESS NOTES
Ms. De is an 80 yo F with chronic hypoxic respiratory failure 2/2 WHO Group III pulmonary HTN with right sided heart failure due to HFpEF and iron deficiency anemia thought to be due to occult GI bleed while on Eliquis for Atrial fibrillation was seen in clinic on 12/22/2021. She was recently evaluated by pHTN specialist who increased her Furosamide dose to 40 mg BID with increase in potassium supplement to 20 mEq daily as well.  There is concern that iron infusions may have caused the hypervolemia (she was unable to tolerate oral iron).  She was advised to follow up with me one week after the increase in Furosamide dose to assess kidney function.  BMP was obtained which showed an increased in creatinine from 0.68 to 2.51.  The last BMP we have is from 11/09/2021 which is from before she began iron infusions. Her potassium level is currently normal.  ZARA etiology is pre-renal due to deydration in the setting of increased Furosamide dose OR cardiorenal with volume overload (it is possible that this creatinine is actually a downtrend from a cardiorenal ZARA that occurred after iron infusions). I feel the most likely cause is from dehydration in the setting of Lasix so my plan would be to hold the Furosamide. Given the fact clinic will be closed until Monday, I think it is advisable to go to the ED to have kidney function repeated. This may mean admission to the hospital.     I spoke with Ms De about options moving forward. The conservative option and my initial recommendation would be evaluation at the ER but given it's [place] and after discussion with Ms. De and her daughter Montana, we will repeat the BMP stat. They were told that results may be back this evening but it's also possible that we don't get them back until tomorrow since they were collected later in the afternoon.  As soon as I see results, I will call and let Ms. De and Montana know. If kidney function is worse, she will go to the ER for  evaluation. If improving, we will continue to hold Furosamide and repeat the BMP on Monday.  Ms. De and Montana were both informed that with this option, we will not be able to check her kidneys over the weekend and it's possible they get worse over that time and could result in more permanent damage which with understand. We discussed that chronic kidney damage will make it difficult to treat her pHTN and heart failure which could lead to death which she understands. She notes that she thought this may happen and is willing to be evaluated in the ER even on Christmas Eve if her kidney function is worse. Family is in agreement with this plan.      Cielo Webster MD

## 2021-12-28 PROBLEM — J96.01 ACUTE RESPIRATORY FAILURE WITH HYPOXIA (HCC): Status: ACTIVE | Noted: 2021-01-01

## 2021-12-28 PROBLEM — J93.83: Status: ACTIVE | Noted: 2021-01-01

## 2021-12-28 PROBLEM — J96.01 ACUTE RESPIRATORY FAILURE WITH HYPOXIA (HCC): Status: RESOLVED | Noted: 2021-01-01 | Resolved: 2021-01-01

## 2021-12-28 PROBLEM — I50.33 ACUTE ON CHRONIC DIASTOLIC CHF (CONGESTIVE HEART FAILURE) (HCC): Status: ACTIVE | Noted: 2021-01-01

## 2021-12-29 NOTE — OUTREACH NOTE
Call Center TCM Note      Responses   [facility] [place] patient discharged from? [place]   Does the patient have one of the following disease processes/diagnoses(primary or secondary)? CHF   TCM attempt successful? Yes   Call start time 1257   Call end time 1308   Discharge diagnosis Group II pulmonary hypertension secondary to chronic diastolic CHF   Is patient permission given to speak with other caregiver? Yes   List who call center can speak with Daughter   Person spoke with today (if not patient) and relationship Pt and daughter on speaker phone.    Meds reviewed with patient/caregiver? Yes   Is the patient having any side effects they believe may be caused by any medication additions or changes? No   Does the patient have all medications ordered at discharge? Yes   Is the patient taking all medications as directed (includes completed medication regime)? Yes   Does the patient have a primary care provider?  Yes   Comments regarding PCP [facility] appt    What DME was ordered? Has O2 in the home only wears when laying down. Discussed wearing O2 if sats drop   Pulse Ox monitoring Intermittent   Pulse Ox device source Patient   O2 Sat: education provided Sat levels,  Monitoring frequency,  When to seek care   Psychosocial issues? No   Did the patient receive a copy of their discharge instructions? Yes   Nursing interventions Reviewed instructions with patient   What is the patient's perception of their health status since discharge? Improving   Nursing interventions Nurse provided patient education   Is the patient weighing daily? Yes   Does the patient have scales? Yes   Daily weight interventions Education provided on importance of daily weight   Is the patient able to teach back Heart Failure diet management? Yes   Is the patient able to teach back Heart Failure Zones? Yes   Is the patient able to teach back signs and symptoms of worsening condition? (i.e. weight gain, shortness of air, etc.) Yes   If the  patient is a current smoker, are they able to teach back resources for cessation? Not a smoker   Is the patient/caregiver able to teach back the hierarchy of who to call/visit for symptoms/problems? PCP, Specialist, Home health nurse, Urgent Care, ED, 911 Yes   TCM call completed? Yes   Wrap up additional comments Pt reports she is doing well and has no needs at this time.            Amy Lemus RN    12/29/2021, 13:08 EST

## 2021-12-29 NOTE — OUTREACH NOTE
Prep Survey      Responses   [facility] patient discharged from? [place]   Is LACE score < 7 ? No   Emergency Room discharge w/ pulse ox? No   Eligibility [facility]   Date of Admission 12/23/21   Date of Discharge 12/28/21   Discharge Disposition Home or Self Care   Discharge diagnosis Group II pulmonary hypertension secondary to chronic diastolic CHF   Does the patient have one of the following disease processes/diagnoses(primary or secondary)? CHF   Does the patient have Home health ordered? No   Is there a DME ordered? No   Prep survey completed? Yes          Chey Nunez RN

## 2022-01-01 ENCOUNTER — READMISSION MANAGEMENT (OUTPATIENT)
Dept: CALL CENTER | Facility: HOSPITAL | Age: 82
End: 2022-01-01

## 2022-01-01 ENCOUNTER — PATIENT MESSAGE (OUTPATIENT)
Dept: CARDIOLOGY | Facility: CLINIC | Age: 82
End: 2022-01-01

## 2022-01-01 ENCOUNTER — OFFICE VISIT (OUTPATIENT)
Dept: INTERNAL MEDICINE | Facility: CLINIC | Age: 82
End: 2022-01-01

## 2022-01-01 ENCOUNTER — TELEPHONE (OUTPATIENT)
Dept: INTERNAL MEDICINE | Facility: CLINIC | Age: 82
End: 2022-01-01

## 2022-01-01 ENCOUNTER — HOME HEALTH ADMISSION (OUTPATIENT)
Dept: HOME HEALTH SERVICES | Facility: HOME HEALTHCARE | Age: 82
End: 2022-01-01

## 2022-01-01 ENCOUNTER — TELEPHONE (OUTPATIENT)
Dept: CARDIOLOGY | Facility: CLINIC | Age: 82
End: 2022-01-01

## 2022-01-01 ENCOUNTER — OFFICE VISIT (OUTPATIENT)
Dept: CARDIOLOGY | Facility: HOSPITAL | Age: 82
End: 2022-01-01

## 2022-01-01 ENCOUNTER — HOME CARE VISIT (OUTPATIENT)
Dept: HOME HEALTH SERVICES | Facility: HOME HEALTHCARE | Age: 82
End: 2022-01-01

## 2022-01-01 ENCOUNTER — TELEMEDICINE (OUTPATIENT)
Dept: INTERNAL MEDICINE | Facility: CLINIC | Age: 82
End: 2022-01-01

## 2022-01-01 ENCOUNTER — OFFICE VISIT (OUTPATIENT)
Dept: PULMONOLOGY | Facility: CLINIC | Age: 82
End: 2022-01-01

## 2022-01-01 ENCOUNTER — LAB (OUTPATIENT)
Dept: LAB | Facility: HOSPITAL | Age: 82
End: 2022-01-01

## 2022-01-01 VITALS
RESPIRATION RATE: 16 BRPM | HEART RATE: 72 BPM | OXYGEN SATURATION: 93 % | TEMPERATURE: 97.6 F | DIASTOLIC BLOOD PRESSURE: 60 MMHG | WEIGHT: 144 LBS | HEIGHT: 63 IN | SYSTOLIC BLOOD PRESSURE: 110 MMHG | BODY MASS INDEX: 25.52 KG/M2

## 2022-01-01 VITALS
HEART RATE: 82 BPM | TEMPERATURE: 97 F | HEIGHT: 63 IN | WEIGHT: 137 LBS | OXYGEN SATURATION: 95 % | DIASTOLIC BLOOD PRESSURE: 60 MMHG | BODY MASS INDEX: 24.27 KG/M2 | SYSTOLIC BLOOD PRESSURE: 110 MMHG

## 2022-01-01 VITALS
TEMPERATURE: 97.5 F | HEART RATE: 76 BPM | DIASTOLIC BLOOD PRESSURE: 60 MMHG | SYSTOLIC BLOOD PRESSURE: 120 MMHG | BODY MASS INDEX: 24.1 KG/M2 | OXYGEN SATURATION: 95 % | WEIGHT: 136 LBS | HEIGHT: 63 IN

## 2022-01-01 VITALS
WEIGHT: 144.13 LBS | RESPIRATION RATE: 18 BRPM | HEIGHT: 63 IN | DIASTOLIC BLOOD PRESSURE: 63 MMHG | HEART RATE: 76 BPM | OXYGEN SATURATION: 93 % | TEMPERATURE: 96.3 F | BODY MASS INDEX: 25.54 KG/M2 | SYSTOLIC BLOOD PRESSURE: 133 MMHG

## 2022-01-01 VITALS
RESPIRATION RATE: 18 BRPM | SYSTOLIC BLOOD PRESSURE: 124 MMHG | HEART RATE: 124 BPM | OXYGEN SATURATION: 94 % | TEMPERATURE: 97.7 F | DIASTOLIC BLOOD PRESSURE: 70 MMHG

## 2022-01-01 VITALS
TEMPERATURE: 98.2 F | SYSTOLIC BLOOD PRESSURE: 140 MMHG | DIASTOLIC BLOOD PRESSURE: 84 MMHG | RESPIRATION RATE: 20 BRPM | HEART RATE: 90 BPM | OXYGEN SATURATION: 92 %

## 2022-01-01 VITALS
OXYGEN SATURATION: 95 % | DIASTOLIC BLOOD PRESSURE: 70 MMHG | SYSTOLIC BLOOD PRESSURE: 134 MMHG | TEMPERATURE: 97 F | RESPIRATION RATE: 18 BRPM | HEART RATE: 122 BPM

## 2022-01-01 VITALS
RESPIRATION RATE: 18 BRPM | SYSTOLIC BLOOD PRESSURE: 118 MMHG | DIASTOLIC BLOOD PRESSURE: 60 MMHG | TEMPERATURE: 97.3 F | HEART RATE: 82 BPM

## 2022-01-01 VITALS
SYSTOLIC BLOOD PRESSURE: 122 MMHG | RESPIRATION RATE: 18 BRPM | DIASTOLIC BLOOD PRESSURE: 67 MMHG | HEART RATE: 78 BPM | TEMPERATURE: 97.9 F | OXYGEN SATURATION: 90 %

## 2022-01-01 DIAGNOSIS — J90 PLEURAL EFFUSION: Primary | ICD-10-CM

## 2022-01-01 DIAGNOSIS — F51.04 PSYCHOPHYSIOLOGICAL INSOMNIA: ICD-10-CM

## 2022-01-01 DIAGNOSIS — R53.1 WEAKNESS: ICD-10-CM

## 2022-01-01 DIAGNOSIS — D50.0 IRON DEFICIENCY ANEMIA DUE TO CHRONIC BLOOD LOSS: ICD-10-CM

## 2022-01-01 DIAGNOSIS — Z78.9 POOR TOLERANCE FOR AMBULATION: ICD-10-CM

## 2022-01-01 DIAGNOSIS — Z71.89 COUNSELING REGARDING ADVANCED CARE PLANNING AND GOALS OF CARE: Primary | ICD-10-CM

## 2022-01-01 DIAGNOSIS — R11.0 NAUSEA: ICD-10-CM

## 2022-01-01 DIAGNOSIS — K21.9 GASTROESOPHAGEAL REFLUX DISEASE WITHOUT ESOPHAGITIS: ICD-10-CM

## 2022-01-01 DIAGNOSIS — N17.9 AKI (ACUTE KIDNEY INJURY): ICD-10-CM

## 2022-01-01 DIAGNOSIS — E87.6 HYPOKALEMIA: Primary | ICD-10-CM

## 2022-01-01 DIAGNOSIS — N35.92 STRICTURE OF FEMALE URETHRA, UNSPECIFIED STRICTURE TYPE: ICD-10-CM

## 2022-01-01 DIAGNOSIS — I50.33 ACUTE ON CHRONIC DIASTOLIC CHF (CONGESTIVE HEART FAILURE): Primary | ICD-10-CM

## 2022-01-01 DIAGNOSIS — I27.20 PULMONARY HTN: ICD-10-CM

## 2022-01-01 DIAGNOSIS — M62.81 ABNORMAL GAIT DUE TO MUSCLE WEAKNESS: ICD-10-CM

## 2022-01-01 DIAGNOSIS — I50.32 CHRONIC DIASTOLIC (CONGESTIVE) HEART FAILURE: Primary | ICD-10-CM

## 2022-01-01 DIAGNOSIS — Z71.89 ADVANCED CARE PLANNING/COUNSELING DISCUSSION: ICD-10-CM

## 2022-01-01 DIAGNOSIS — R11.2 NAUSEA AND VOMITING, INTRACTABILITY OF VOMITING NOT SPECIFIED, UNSPECIFIED VOMITING TYPE: ICD-10-CM

## 2022-01-01 DIAGNOSIS — J96.11 CHRONIC RESPIRATORY FAILURE WITH HYPOXIA: ICD-10-CM

## 2022-01-01 DIAGNOSIS — E83.42 HYPOMAGNESEMIA: ICD-10-CM

## 2022-01-01 DIAGNOSIS — Z91.81 AT HIGH RISK FOR FALLS: ICD-10-CM

## 2022-01-01 DIAGNOSIS — E87.6 HYPOKALEMIA: ICD-10-CM

## 2022-01-01 DIAGNOSIS — I50.32 CHRONIC DIASTOLIC (CONGESTIVE) HEART FAILURE: ICD-10-CM

## 2022-01-01 DIAGNOSIS — I10 ESSENTIAL HYPERTENSION: ICD-10-CM

## 2022-01-01 DIAGNOSIS — K59.04 CHRONIC IDIOPATHIC CONSTIPATION: ICD-10-CM

## 2022-01-01 DIAGNOSIS — H93.8X3 CONGESTION OF BOTH EARS: ICD-10-CM

## 2022-01-01 DIAGNOSIS — R26.9 ABNORMAL GAIT DUE TO MUSCLE WEAKNESS: ICD-10-CM

## 2022-01-01 DIAGNOSIS — N18.32 STAGE 3B CHRONIC KIDNEY DISEASE: ICD-10-CM

## 2022-01-01 DIAGNOSIS — M62.81 MUSCLE WEAKNESS (GENERALIZED): Primary | ICD-10-CM

## 2022-01-01 DIAGNOSIS — F39 MOOD DISORDER: ICD-10-CM

## 2022-01-01 DIAGNOSIS — F51.02 ADJUSTMENT INSOMNIA: ICD-10-CM

## 2022-01-01 LAB
ANION GAP SERPL CALCULATED.3IONS-SCNC: 15.2 MMOL/L (ref 5–15)
ANION GAP SERPL CALCULATED.3IONS-SCNC: 15.2 MMOL/L (ref 5–15)
ANION GAP SERPL CALCULATED.3IONS-SCNC: 15.8 MMOL/L (ref 5–15)
BUN SERPL-MCNC: 11 MG/DL (ref 8–23)
BUN SERPL-MCNC: 12 MG/DL (ref 8–23)
BUN SERPL-MCNC: 17 MG/DL (ref 8–23)
BUN/CREAT SERPL: 11.6 (ref 7–25)
BUN/CREAT SERPL: 14.2 (ref 7–25)
BUN/CREAT SERPL: 15.4 (ref 7–25)
CALCIUM SPEC-SCNC: 10.1 MG/DL (ref 8.6–10.5)
CALCIUM SPEC-SCNC: 9.2 MG/DL (ref 8.6–10.5)
CALCIUM SPEC-SCNC: 9.3 MG/DL (ref 8.6–10.5)
CHLORIDE SERPL-SCNC: 85 MMOL/L (ref 98–107)
CHLORIDE SERPL-SCNC: 90 MMOL/L (ref 98–107)
CHLORIDE SERPL-SCNC: 90 MMOL/L (ref 98–107)
CO2 SERPL-SCNC: 32.2 MMOL/L (ref 22–29)
CO2 SERPL-SCNC: 36.8 MMOL/L (ref 22–29)
CO2 SERPL-SCNC: 36.8 MMOL/L (ref 22–29)
CREAT SERPL-MCNC: 0.78 MG/DL (ref 0.57–1)
CREAT SERPL-MCNC: 0.95 MG/DL (ref 0.57–1)
CREAT SERPL-MCNC: 1.2 MG/DL (ref 0.57–1)
FERRITIN SERPL-MCNC: 437 NG/ML (ref 13–150)
GFR SERPL CREATININE-BSD FRML MDRD: 43 ML/MIN/1.73
GFR SERPL CREATININE-BSD FRML MDRD: 56 ML/MIN/1.73
GFR SERPL CREATININE-BSD FRML MDRD: 71 ML/MIN/1.73
GLUCOSE SERPL-MCNC: 110 MG/DL (ref 65–99)
GLUCOSE SERPL-MCNC: 148 MG/DL (ref 65–99)
GLUCOSE SERPL-MCNC: 92 MG/DL (ref 65–99)
HCT VFR BLD AUTO: 40.7 % (ref 34–46.6)
HGB BLD-MCNC: 12 G/DL (ref 12–15.9)
IRON 24H UR-MRATE: 49 MCG/DL (ref 37–145)
IRON SATN MFR SERPL: 14 % (ref 20–50)
MAGNESIUM SERPL-MCNC: 1.4 MG/DL (ref 1.6–2.4)
MAGNESIUM SERPL-MCNC: 1.5 MG/DL (ref 1.6–2.4)
MAGNESIUM SERPL-MCNC: 1.8 MG/DL (ref 1.6–2.4)
MCH RBC QN AUTO: 25.3 PG (ref 26.6–33)
MCHC RBC AUTO-ENTMCNC: 29.5 G/DL (ref 31.5–35.7)
MCV RBC AUTO: 85.7 FL (ref 79–97)
PLATELET # BLD AUTO: 303 10*3/MM3 (ref 140–450)
PMV BLD AUTO: 10 FL (ref 6–12)
POTASSIUM SERPL-SCNC: 2.6 MMOL/L (ref 3.5–5.2)
POTASSIUM SERPL-SCNC: 3.3 MMOL/L (ref 3.5–5.2)
POTASSIUM SERPL-SCNC: 3.7 MMOL/L (ref 3.5–5.2)
RBC # BLD AUTO: 4.75 10*6/MM3 (ref 3.77–5.28)
SODIUM SERPL-SCNC: 137 MMOL/L (ref 136–145)
SODIUM SERPL-SCNC: 138 MMOL/L (ref 136–145)
SODIUM SERPL-SCNC: 142 MMOL/L (ref 136–145)
TIBC SERPL-MCNC: 344 MCG/DL (ref 298–536)
TRANSFERRIN SERPL-MCNC: 231 MG/DL (ref 200–360)
WBC NRBC COR # BLD: 6.91 10*3/MM3 (ref 3.4–10.8)

## 2022-01-01 PROCEDURE — 83735 ASSAY OF MAGNESIUM: CPT

## 2022-01-01 PROCEDURE — 93296 REM INTERROG EVL PM/IDS: CPT | Performed by: INTERNAL MEDICINE

## 2022-01-01 PROCEDURE — G0180 MD CERTIFICATION HHA PATIENT: HCPCS | Performed by: STUDENT IN AN ORGANIZED HEALTH CARE EDUCATION/TRAINING PROGRAM

## 2022-01-01 PROCEDURE — 99214 OFFICE O/P EST MOD 30 MIN: CPT | Performed by: STUDENT IN AN ORGANIZED HEALTH CARE EDUCATION/TRAINING PROGRAM

## 2022-01-01 PROCEDURE — 80048 BASIC METABOLIC PNL TOTAL CA: CPT

## 2022-01-01 PROCEDURE — G0151 HHCP-SERV OF PT,EA 15 MIN: HCPCS

## 2022-01-01 PROCEDURE — 93294 REM INTERROG EVL PM/LDLS PM: CPT | Performed by: INTERNAL MEDICINE

## 2022-01-01 PROCEDURE — 99215 OFFICE O/P EST HI 40 MIN: CPT | Performed by: STUDENT IN AN ORGANIZED HEALTH CARE EDUCATION/TRAINING PROGRAM

## 2022-01-01 PROCEDURE — 99214 OFFICE O/P EST MOD 30 MIN: CPT | Performed by: NURSE PRACTITIONER

## 2022-01-01 PROCEDURE — 84466 ASSAY OF TRANSFERRIN: CPT

## 2022-01-01 PROCEDURE — 99495 TRANSJ CARE MGMT MOD F2F 14D: CPT | Performed by: STUDENT IN AN ORGANIZED HEALTH CARE EDUCATION/TRAINING PROGRAM

## 2022-01-01 PROCEDURE — 85027 COMPLETE CBC AUTOMATED: CPT

## 2022-01-01 PROCEDURE — 36415 COLL VENOUS BLD VENIPUNCTURE: CPT

## 2022-01-01 PROCEDURE — 82728 ASSAY OF FERRITIN: CPT

## 2022-01-01 PROCEDURE — 83540 ASSAY OF IRON: CPT

## 2022-01-01 PROCEDURE — 1111F DSCHRG MED/CURRENT MED MERGE: CPT | Performed by: STUDENT IN AN ORGANIZED HEALTH CARE EDUCATION/TRAINING PROGRAM

## 2022-01-01 RX ORDER — FLUTICASONE PROPIONATE 50 MCG
2 SPRAY, SUSPENSION (ML) NASAL DAILY
Qty: 15.8 ML | Refills: 2 | Status: SHIPPED | OUTPATIENT
Start: 2022-01-01

## 2022-01-01 RX ORDER — BUSPIRONE HYDROCHLORIDE 5 MG/1
15 TABLET ORAL 2 TIMES DAILY
Qty: 180 TABLET | Refills: 3 | Status: SHIPPED | OUTPATIENT
Start: 2022-01-01 | End: 2022-01-01

## 2022-01-01 RX ORDER — MAGNESIUM OXIDE 400 MG/1
400 TABLET ORAL DAILY
Qty: 14 TABLET | Refills: 0 | Status: SHIPPED | OUTPATIENT
Start: 2022-01-01

## 2022-01-01 RX ORDER — ONDANSETRON 4 MG/1
4 TABLET, FILM COATED ORAL EVERY 8 HOURS PRN
Qty: 30 TABLET | Refills: 3 | Status: SHIPPED | OUTPATIENT
Start: 2022-01-01

## 2022-01-03 NOTE — PROGRESS NOTES
Transitional Care Management Visit     Date: 2022      Patient Name: Kenisha Ruvalcaba  : 1940   MRN: [id number]     Chief Complaint:    Chief Complaint   Patient presents with   • Transitional Care Management     CHF       History of Present Illness: Kenisha Ruvalcaba is a 81 y.o. female who presents to clinic today for transitional care management. She initially felt great after being discharged from the hospital. She is now having difficulty sleeping at night and finds herself sleeping more during the day.  She does not get out of bed much.  She eats multiple small meals during the day. She is having soft bowel movements. She reports almost daily nausea that improves after vomiting. She was not discharged with home PT/OT.  She has a history of urethral stricture requiring dilation. She noticed decreased urination and dribbling low volume of urine. She also notes though that she weighs before and after urinating and that the weight may drop by 0.2-0.3 lbs after urinating.     I reviewed and discussed the details of that call along with the discharge summary, hospital problems, inpatient lab results, inpatient diagnostic studies, and consultation reports with Kenisha.     Current outpatient and discharge medications have been reconciled for the patient.  Reviewed by: Nereida Webster MD      Date of TCM Phone Call 2021   [facility]   Date of Admission 2021   Date of Discharge 2021   Discharge Disposition Home or Self Care     Risk for Readmission (LACE) Score: 9 (2021  6:01 AM)    Course During Hospital Stay:   Kenisha was admitted on 2021 for ZARA secondary to cardiorenal syndrome.  She was diuresed with IV Bumex throughout hospitalization with improvement in kidney function.  She initially required 4 L nasal cannula and was weaned to room air.  Chest x-ray performed showed moderate left pleural effusion and renal ultrasound had signs suggestive of mild  medical renal disease but no other acute findings.  She was discharged with reduced dose of Eliquis at 2.5 mg twice daily and Bumex 1 mg every other day.    Pending diagnostic testing: none     The following portions of the patient's history were reviewed and updated as appropriate: allergies, current medications, past family history, past medical history, past social history, past surgical history and problem list.    Subjective     Review of System: Review of Systems   Constitutional: Positive for appetite change and fatigue.   Gastrointestinal: Positive for constipation.   Genitourinary: Positive for difficulty urinating.   Psychiatric/Behavioral: Positive for sleep disturbance. The patient is nervous/anxious.       I have reviewed the ROS documented by my clinical staff, updated appropriately and I agree. Nereida Webster MD    Past Medical History:   Past Medical History:   Diagnosis Date   • Anesthesia complication     HARD TO WAKE, Hallucinations, Agitation    • Anxiety    • Broken arm 08/30/2019    left upper arm   • Broken nose 08/30/2019   • Chronic hip pain, left 1/7/2019   • Dyslipidemia    • Fall 08/30/2019   • History of cardioversion 12/03/2010    initiation of Tambocor and subsequent external cardioversion.   • ÁNGELA (obstructive sleep apnea)    • Paroxysmal atrial fibrillation (HCC)    • Vertigo    • Wears glasses        Past Surgical History:   Past Surgical History:   Procedure Laterality Date   • BREAST BIOPSY Left     MARKER IN PLACE   • CARDIAC CATHETERIZATION Left 6/16/2016    Procedure: Cardiac catheterization;  Surgeon: Chuck Clark MD;  Location:  [facility];  Service:    • CARDIAC CATHETERIZATION N/A 1/30/2020    Procedure: Right Heart Cath;  Surgeon: Sang Sosa MD;  Location:  [facility];  Service: Cardiology   • CARDIAC ELECTROPHYSIOLOGY PROCEDURE N/A 2/17/2017    Procedure: Pacemaker DC new;  Surgeon: Darryl Swenson MD;  Location:   [facility] EP INVASIVE LOCATION;  Service:    • CARDIAC ELECTROPHYSIOLOGY PROCEDURE N/A 4/8/2019    Procedure: Ablation atrial fibrillation, hold Tikosyn for 5 days stay on metoprolol.;  Surgeon: Darryl Swenson MD;  Location:  [facility] EP INVASIVE LOCATION;  Service: Cardiovascular   • CAROTID ENDARTERECTOMY Left    • CATARACT EXTRACTION Bilateral    • COLONOSCOPY      every 5-10 years. does not plan to have anymore   • OTHER SURGICAL HISTORY      left lung was drained on 11/5/21   • PACEMAKER IMPLANTATION     • TOTAL HIP ARTHROPLASTY Left 1/30/2019    Procedure: TOTAL HIP ARTHROPLASTY LEFT;  Surgeon: Freddie Vale MD;  Location:  [facility] OR;  Service: Orthopedics   • TUBAL ABDOMINAL LIGATION         Family History:   Family History   Problem Relation Age of Onset   • Alzheimer's disease Mother    • Cancer Mother    • Aortic aneurysm Father    • Heart valve disorder Sister        Social History:   Social History     Socioeconomic History   • Marital status:    Tobacco Use   • Smoking status: Never Smoker   • Smokeless tobacco: Never Used   Vaping Use   • Vaping Use: Never used   Substance and Sexual Activity   • Alcohol use: Yes     Alcohol/week: 1.0 standard drink     Types: 1 Glasses of wine per week     Comment: occas   • Drug use: No   • Sexual activity: Defer       Medications:     Current Outpatient Medications:   •  acetaminophen (TYLENOL) 325 MG tablet, Take 2 tablets by mouth Every 4 (Four) Hours As Needed for Mild Pain ., Disp: , Rfl:   •  apixaban (ELIQUIS) 2.5 MG tablet tablet, Take 1 tablet by mouth 2 (Two) Times a Day. Indications: Atrial Fibrillation, Disp: 60 tablet, Rfl: 5  •  aspirin 81 MG EC tablet, Take 81 mg by mouth Every Night., Disp: , Rfl:   •  bumetanide (BUMEX) 1 MG tablet, Take 1 tablet by mouth Every Other Day., Disp: 30 tablet, Rfl: 5  •  busPIRone (BUSPAR) 5 MG tablet, Take 3 tablets by mouth 2 (Two) Times a Day., Disp: 180 tablet, Rfl: 3  •  cholecalciferol (VITAMIN D3) 1000  "UNITS tablet, Take 1,000 Units by mouth Daily., Disp: , Rfl:   •  coenzyme Q10 100 MG capsule, Take 100 mg by mouth 2 (Two) Times a Week., Disp: , Rfl:   •  levothyroxine (SYNTHROID, LEVOTHROID) 50 MCG tablet, Take 50 mcg by mouth Every Morning., Disp: , Rfl:   •  meclizine (ANTIVERT) 25 MG tablet, Take 25 mg by mouth 3 (Three) Times a Day As Needed for dizziness., Disp: , Rfl:   •  metoprolol succinate XL (TOPROL-XL) 100 MG 24 hr tablet, Take 0.5 tablets by mouth Daily., Disp: 30 tablet, Rfl: 6  •  nystatin (MYCOSTATIN) 415576 UNIT/GM powder, Apply  topically to the appropriate area as directed 3 (Three) Times a Day. Use until rash has improved., Disp: 30 g, Rfl: 1  •  ondansetron (Zofran) 4 MG tablet, Take 1 tablet by mouth Every 8 (Eight) Hours As Needed for Nausea or Vomiting., Disp: 30 tablet, Rfl: 3  •  PARoxetine (PAXIL) 20 MG tablet, Take 1 tablet by mouth Every Morning., Disp: , Rfl:   •  potassium chloride 10 MEQ CR tablet, Take 2 tablets by mouth Daily. (Patient taking differently: Take 20 mEq by mouth Daily. Takes on days taking bumex), Disp: 90 tablet, Rfl: 3  •  rosuvastatin (CRESTOR) 20 MG tablet, Take 20 mg by mouth 3 (Three) Times a Week. MON, WEDS, FRI, Disp: , Rfl:   •  traMADol (ULTRAM) 50 MG tablet, Take 50 mg by mouth Every 6 (Six) Hours As Needed., Disp: , Rfl:   •  traZODone (DESYREL) 50 MG tablet, TAKE 1-2 TABLETS AT BEDTIME AS NEEDED INSOMNIA, Disp: , Rfl: 5  •  fluticasone (Flonase) 50 MCG/ACT nasal spray, 2 sprays into the nostril(s) as directed by provider Daily., Disp: 15.8 mL, Rfl: 2    Allergies:   Allergies   Allergen Reactions   • Sulfa Antibiotics Rash     RASH        Objective     Physical Exam:   Vital Signs:   Vitals:    01/03/22 0952   BP: 110/60   Pulse: 72   Resp: 16   Temp: 97.6 °F (36.4 °C)   SpO2: 93%   Weight: 65.3 kg (144 lb)   Height: 160 cm (63\")   PainSc: 0-No pain     Body mass index is 25.51 kg/m².     Physical Exam  Vitals and nursing note reviewed. "   Constitutional:       General: She is not in acute distress.     Appearance: Normal appearance. She is not ill-appearing or toxic-appearing.   HENT:      Right Ear: Tympanic membrane, ear canal and external ear normal. There is no impacted cerumen.      Left Ear: Tympanic membrane, ear canal and external ear normal. There is no impacted cerumen.   Cardiovascular:      Rate and Rhythm: Normal rate. Rhythm irregular.      Heart sounds: Murmur heard.       Pulmonary:      Effort: Pulmonary effort is normal. No respiratory distress.      Breath sounds: Normal breath sounds. No wheezing, rhonchi or rales.   Musculoskeletal:      Right lower leg: Edema (mild) present.      Left lower leg: Edema (mild) present.   Skin:     General: Skin is warm and dry.   Neurological:      Mental Status: She is alert.   Psychiatric:         Mood and Affect: Mood normal.         Behavior: Behavior normal.       Assessment / Plan      Assessment/Plan:   Diagnoses and all orders for this visit:    1. Acute on chronic diastolic CHF (congestive heart failure) (HCC) (Primary)  Continue Bumex every other day. Weighing daily and will let clinic know if weight increases by 3 lbs in 1 day or 5 lbs in 3 days so that dose may be adjusted. BMP and magnesium level ordered today.     2. ZARA (acute kidney injury) ([facility])  2/2 cardiorenal syndrome. Continue Bumex every other day. Weighing daily and will let clinic know if weight increases by 3 lbs in 1 day or 5 lbs in 3 days so that dose may be adjusted. BMP and magnesium level ordered today.     3. Pulmonary HTN (HCC)  Treatment as above.     4. Insomnia  Anxiety likely contributing to insomnia. Increasing buspirone dose to 15 mg BID. Continue Paxil at same dose. If no improvement by next appointment, may transition to a different SSRI. Recommended taking in the morning as this may be contributing to insomnia. Also recommended Melatonin at night.  May also try CBD gummy at night if melatonin does not  help.  We also discussed reading a book to distract her thoughts if unable to sleep at night until feeling tired then going to sleep.     5. Mood disorder (HCC)  Anxiety likely contributing to insomnia. Increasing buspirone dose to 15 mg BID. Continue Paxil at same dose. If no improvement by next appointment, may transition to a different SSRI. Recommended taking in the morning as this may be contributing to insomnia.     6. Congestion of both ears  fluticasone (Flonase) 50 MCG/ACT nasal spray; 2 sprays into the nostril(s) as directed by provider Daily.  Dispense: 15.8 mL; Refill: 2    7. Nausea  Ondansetron prescribed.     8. Stricture of female urethra, unspecified stricture type  Reports previously requiring urethral dilation which she notes she would not want to do again.  She is having decreased urinary flow and dribbling. Will refer to Urology to assess if stricture could be contributing to this as could impact treatment of heart failure.    9. Abnormal gait due to muscle weakness  Very weak after hospitalization. Difficulty with ambulation and transfer. Would benefit from home health PT.     Follow Up:   Return in about 4 weeks (around 1/31/2022) for Recheck, 30 minute visit .    Time:   I spent approximately 45 minutes providing clinical care for this patient; including review of patient's chart and provider documentation (specifically H&P, discharge summary, and other pertinent documents as well as labs and imaging pertaining to hospitalization), face to face time spent with patient in examination room (obtaining history, performing physical exam, discussing diagnosis and management options), medication reconciliation and management, placing orders, and completing patient documentation.     Nereida Webster MD  [facility]

## 2022-01-05 NOTE — OUTREACH NOTE
CHF Week 2 Survey      Responses   [facility] patient discharged from? [place]   Does the patient have one of the following disease processes/diagnoses(primary or secondary)? CHF   Week 2 attempt successful? Yes   Call start time 1838   Call end time 1853   Discharge diagnosis Group II pulmonary hypertension secondary to chronic diastolic CHF   Is the patient taking all medications as directed (includes completed medication regime)? Yes   Does the patient have a primary care provider?  Yes   Does the patient have an appointment with their PCP within 7 days of discharge? Greater than 7 days   Has the patient kept scheduled appointments due by today? N/A   Pulse Ox monitoring Intermittent   Pulse Ox device source Patient   O2 Sat comments 93-88% on 4L   O2 Sat: education provided Sat levels,  When to seek care,  Monitoring frequency   Psychosocial issues? No   Did the patient receive a copy of their discharge instructions? Yes   Nursing interventions Reviewed instructions with patient   What is the patient's perception of their health status since discharge? Improving   Is the patient weighing daily? Yes   Does the patient have scales? Yes   Daily weight interventions Education provided on importance of daily weight   Is the patient able to teach back Heart Failure diet management? Yes   Is the patient able to teach back Heart Failure Zones? Yes   Is the patient able to teach back signs and symptoms of worsening condition? (i.e. weight gain, shortness of air, etc.) Yes   If the patient is a current smoker, are they able to teach back resources for cessation? Not a smoker   Is the patient/caregiver able to teach back the hierarchy of who to call/visit for symptoms/problems? PCP, Specialist, Home health nurse, Urgent Care, ED, 911 Yes   Additional teach back comments States she is doing well.  Felt better yesterday but isn't feeling bad today just not as good as yesterday.     CHF Week 2 call completed? Yes    Wrap up additional comments Denies questions or needs at this time          Allegra Menon, SAVITAN

## 2022-01-13 NOTE — OUTREACH NOTE
CHF Week 3 Survey      Responses   [facility] patient discharged from? [place]   Does the patient have one of the following disease processes/diagnoses(primary or secondary)? CHF   Week 3 attempt successful? Yes   Call start time 1211   Call end time 1214   Discharge diagnosis Group II pulmonary hypertension secondary to chronic diastolic CHF   Is the patient taking all medications as directed (includes completed medication regime)? Yes   Comments regarding appointments saw cardiology on 1/3   Does the patient have a primary care provider?  Yes   Comments regarding PCP saw PCP on 1/3   Has the patient kept scheduled appointments due by today? Yes   Psychosocial issues? No   What is the patient's perception of their health status since discharge? New symptoms unrelated to diagnosis  [says she has some nausea/dry heaves]   Nursing interventions Nurse provided patient education   Is the patient weighing daily? Yes   Does the patient have scales? Yes   Daily weight interventions Education provided on importance of daily weight   Is the patient able to teach back signs and symptoms of worsening condition? (i.e. weight gain, shortness of air, etc.) Yes   Is the patient/caregiver able to teach back the hierarchy of who to call/visit for symptoms/problems? PCP, Specialist, Home health nurse, Urgent Care, ED, 911 Yes   CHF Week 3 call completed? Yes   Wrap up additional comments Says she is sick to her stomach today, advised her to keep hydrated and let her PCP know if she does not improve.          Angelique Dick RN

## 2022-01-13 NOTE — TELEPHONE ENCOUNTER
Caller: CHRISTINA     Relationship: [facility]     Best call back number: [phone]    What was the call regarding:   CHRISTINA FROM [facility] THAT PATIENT REFUSED PHYSICAL THERAPY AT THAT LAST VISIT BUT TODAY RECEVIED A CALL FROM PATIENT'S DAUGHTER AND NOW PATIENT WOULD LIKE FOR A NEW ORDER FOR HOME HEALTH PHYSICAL THERAPY TO COME OUT TODAY 01/13/2022    CHRISTINA STATED THAT THEY NEED A NEW ORDER TO BE FAXED OVER TO BE ABLE TO SEE THE PATIENT TODAY 01/13/2022    FAX: [phone]    Do you require a callback: YES

## 2022-01-18 PROBLEM — M25.552 CHRONIC HIP PAIN, LEFT: Status: RESOLVED | Noted: 2019-01-07 | Resolved: 2022-01-01

## 2022-01-18 PROBLEM — J96.11 CHRONIC RESPIRATORY FAILURE WITH HYPOXIA (HCC): Status: ACTIVE | Noted: 2022-01-01

## 2022-01-18 PROBLEM — G89.29 CHRONIC HIP PAIN, LEFT: Status: RESOLVED | Noted: 2019-01-07 | Resolved: 2022-01-01

## 2022-01-18 PROBLEM — D72.829 LEUKOCYTOSIS: Status: RESOLVED | Noted: 2019-01-31 | Resolved: 2022-01-01

## 2022-01-18 PROBLEM — F51.02 ADJUSTMENT INSOMNIA: Status: ACTIVE | Noted: 2022-01-01

## 2022-01-18 PROBLEM — N17.9 AKI (ACUTE KIDNEY INJURY) (HCC): Status: RESOLVED | Noted: 2021-01-01 | Resolved: 2022-01-01

## 2022-01-18 PROBLEM — J93.83: Status: RESOLVED | Noted: 2021-01-01 | Resolved: 2022-01-01

## 2022-01-18 PROBLEM — I50.32 CHRONIC DIASTOLIC (CONGESTIVE) HEART FAILURE (HCC): Status: ACTIVE | Noted: 2021-01-01

## 2022-01-18 NOTE — PROGRESS NOTES
Internal Medicine Established Office Visit      Date: 2022     Patient Name: Kenisha Ruvalcaba  : 1940   MRN: [id number]     Chief Complaint:    Chief Complaint   Patient presents with   • Hypothyroidism     follow up   • Prediabetes       History of Present Illness: Kenisha Ruvalcaba is a 81 y.o. female who presents to clinic today for follow up.     Chronic Hypoxic Respiratory Failure 2/2 HFpEF resulting in pHTN  She has been having good days and bad days. She and her daughters find that good days tend to coincide with the day after taking Bumex. She has been recording her weights and has lost roughly 13 lbs since her last appointment a little over 2 weeks ago. She does not wear her oxygen during the day and only wears it while sleeping or if she is going to doctors appointments. She has a pulse oximeter at home and finds her O2 is less than 88% without oxygen.     Subjective     I have reviewed and the following portions of the patient's history were updated as appropriate: past family history, past medical history, past social history, past surgical history and problem list.     Medications:     Current Outpatient Medications:   •  acetaminophen (TYLENOL) 325 MG tablet, Take 2 tablets by mouth Every 4 (Four) Hours As Needed for Mild Pain ., Disp: , Rfl:   •  apixaban (ELIQUIS) 2.5 MG tablet tablet, Take 1 tablet by mouth 2 (Two) Times a Day. Indications: Atrial Fibrillation, Disp: 60 tablet, Rfl: 5  •  bumetanide (BUMEX) 1 MG tablet, Take 1 tablet by mouth Every Other Day., Disp: 30 tablet, Rfl: 5  •  busPIRone (BUSPAR) 5 MG tablet, Take 3 tablets by mouth 2 (Two) Times a Day., Disp: 180 tablet, Rfl: 3  •  cholecalciferol (VITAMIN D3) 1000 UNITS tablet, Take 1,000 Units by mouth Daily., Disp: , Rfl:   •  coenzyme Q10 100 MG capsule, Take 100 mg by mouth 2 (Two) Times a Week., Disp: , Rfl:   •  fluticasone (Flonase) 50 MCG/ACT nasal spray, 2 sprays into the nostril(s) as directed by provider Daily.,  "Disp: 15.8 mL, Rfl: 2  •  levothyroxine (SYNTHROID, LEVOTHROID) 50 MCG tablet, Take 50 mcg by mouth Every Morning., Disp: , Rfl:   •  meclizine (ANTIVERT) 25 MG tablet, Take 25 mg by mouth 3 (Three) Times a Day As Needed for dizziness., Disp: , Rfl:   •  metoprolol succinate XL (TOPROL-XL) 100 MG 24 hr tablet, Take 0.5 tablets by mouth Daily., Disp: 30 tablet, Rfl: 6  •  nystatin (MYCOSTATIN) 388907 UNIT/GM powder, Apply  topically to the appropriate area as directed 3 (Three) Times a Day. Use until rash has improved., Disp: 30 g, Rfl: 1  •  ondansetron (Zofran) 4 MG tablet, Take 1 tablet by mouth Every 8 (Eight) Hours As Needed for Nausea or Vomiting., Disp: 30 tablet, Rfl: 3  •  PARoxetine (PAXIL) 20 MG tablet, Take 1 tablet by mouth Every Morning., Disp: , Rfl:   •  potassium chloride 10 MEQ CR tablet, Take 2 tablets by mouth Daily. (Patient taking differently: Take 20 mEq by mouth Daily. Takes on days taking bumex), Disp: 90 tablet, Rfl: 3  •  rosuvastatin (CRESTOR) 20 MG tablet, Take 20 mg by mouth 3 (Three) Times a Week. MON, WEDS, FRI, Disp: , Rfl:   •  traMADol (ULTRAM) 50 MG tablet, Take 50 mg by mouth Every 6 (Six) Hours As Needed., Disp: , Rfl:   •  traZODone (DESYREL) 50 MG tablet, TAKE 1-2 TABLETS AT BEDTIME AS NEEDED INSOMNIA, Disp: , Rfl: 5    Allergies:   Allergies   Allergen Reactions   • Sulfa Antibiotics Rash     RASH        Objective     Physical Exam:   Vital Signs:   Vitals:    01/18/22 1202   BP: 110/60   Pulse: 82   Temp: 97 °F (36.1 °C)   SpO2: 95%   Weight: 62.1 kg (137 lb)   Height: 160 cm (63\")   PainSc: 0-No pain     Body mass index is 24.27 kg/m².     Physical Exam  Vitals and nursing note reviewed.   Constitutional:       General: She is not in acute distress.     Appearance: Normal appearance. She is not ill-appearing or toxic-appearing.   HENT:      Head: Normocephalic and atraumatic.   Cardiovascular:      Rate and Rhythm: Normal rate. Rhythm irregular.      Heart sounds: Murmur " heard.       Pulmonary:      Effort: Pulmonary effort is normal. No respiratory distress.      Breath sounds: Normal breath sounds. No wheezing, rhonchi or rales.   Abdominal:      General: Abdomen is flat.      Palpations: Abdomen is soft.      Tenderness: There is no abdominal tenderness.   Musculoskeletal:      Right lower leg: Edema present.      Left lower leg: Edema present.   Skin:     General: Skin is warm and dry.   Neurological:      Mental Status: She is alert.       Assessment / Plan      Assessment/Plan:   Diagnoses and all orders for this visit:    1. Chronic diastolic (congestive) heart failure (HCC) (Primary)  Chronic, stable. Weight is down from 145 -> 132 since 01/03/2022. Continue Bumex 1 mg every other day. BMP and magnesium level ordered today. Ongoing nausea could be due to edema around GI tract.     2. Chronic respiratory failure with hypoxia (HCC)  Chronic, stable. 2/2 chronic HFpEF resulting in pHTN. She has not been wearing oxygen during the day so encouraged her to wear it at all times.  Pulse oximeter at home is less than 88% when not wearing oxygen.     3. Pulmonary HTN (HCC)  Treatment as above with HFpEF and respiratory failure.     4. Iron deficiency anemia due to chronic blood loss  Iron infusion ultimately caused heart failure exacerbation with associated cardiorenal syndrome. Iron panel, CBC, and ferritin ordered today. While there is likely a component of anemia of chronic disease, I am concerned that there could be another process driving iron deficiency within the GI tract. This could be contributing to ongoing nausea.     5. Adjustment insomnia  Sleeping throughout the day and awake at night. Reports watching HGTV until 3-4 am every night. Melatonin has not helped. Discussed trying to stay on a schedule, getting up at the same time every morning as she currently stays in bed until quite late in the morning.     6. Nausea  Currently the most impactful symptom. Makes it difficult  to eat. Zofran helps. Finds symptoms are better the day after Bumex. Likely multifactorial: may be related to edema around GI tract which is why it is better the day after taking Bumex; medication-induced, GI pathology that may be driving iron deficiency anemia.     6. Chronic idiopathic constipation  Now having bowel movement every other day.     7. Weakness  Home health PT re-ordered.    8. Advanced Care Planning   Discussed expected course of her current chronic conditions. She and her family understand that current conditions cannot be reversed but managed. They also understand that because of these conditions, life expectancy is not years. Goals are to be more comfortable and enjoy what time she has left with family.  At prior appointment, discussed code status and provided patient with information about advanced care planning. During recent hospitalization, she elected to be DNR/DNI. Currently, she spends most of her time in bed.  Not ready to discuss hospice but interested in palliative care to help provide comfort. Referral placed to palliative care.     Follow Up:   Return in about 6 weeks (around 3/1/2022) for Recheck, 30 minute visit. PRefer last appointment on tuesday.    Time:  I spent approximately 60 minutes providing clinical care for this patient; including review of patient's chart and provider documentation, face to face time spent with patient in examination room (obtaining history, performing physical exam, discussing diagnosis and management options), placing orders, and completing patient documentation. The majority of this time was spent face-to-face discussing current symptoms and management with the patient and her two daughters, Aide and [place].     Multiple chronic, stable conditions assessed with 3 unique tests ordered consistent with moderate MDM.     Addendum (01/19/2022): Labs reviewed. Great news is that kidney function, CBC, iron studies look great. Potassium is quite low at 2.6. She  has been taking potassium 40 mEq every other day when she takes Bumex. Spoke with patient and recommended taking Potassium 40 mEq twice daily starting today. Her magnesium level is also slightly low so she will take magnesium oxide 400 mg once daily. Counseled patient that this may cause diarrhea but this may not be an issue since she has mild constipation. If she has high volume diarrhea, she will stop taking this medication.     Nereida Webster MD  [facility] [place], [place] [facility]

## 2022-01-21 NOTE — TELEPHONE ENCOUNTER
Caller: Kenisha Ruvalcaba    Relationship to patient: Self    Best call back number:[phone]    Patient is needing: PATIENT WANTED TO KNOW IF SHE COULD SKIP TODAY FROM COMING TO DO BLOOD WORK BECAUSE SHE IS NOT FEELING THE BEST    PLEASE ADVISE

## 2022-01-21 NOTE — TELEPHONE ENCOUNTER
Spoke with patients  and he stated she was not feeling well enough to come in today. Stated she has taken three of the potassium pills but would like to wait until Monday for her labs.

## 2022-01-23 NOTE — HOME HEALTH
Other history: L THR   30 second sit to stand test: 3 reps  TUG test: 39 seconds  next visit 1/27 @ 10am

## 2022-01-24 NOTE — OUTREACH NOTE
CHF Week 4 Survey      Responses   [facility] patient discharged from? [place]   Does the patient have one of the following disease processes/diagnoses(primary or secondary)? CHF   Week 4 attempt successful? Yes   Call start time 1528   Call end time 1530   Discharge diagnosis Group II pulmonary hypertension secondary to chronic diastolic CHF   Meds reviewed with patient/caregiver? Yes   Is the patient having any side effects they believe may be caused by any medication additions or changes? No   Is the patient taking all medications as directed (includes completed medication regime)? Yes   Has the patient kept scheduled appointments due by today? Yes   Is the patient still receiving Home Health Services? Yes   Pulse Ox monitoring Intermittent   Pulse Ox device source Patient   O2 Sat: education provided Sat levels   Psychosocial issues? No   What is the patient's perception of their health status since discharge? Improving   Nursing interventions Nurse provided patient education   Is the patient weighing daily? Yes   Does the patient have scales? Yes   Daily weight interventions Education provided on importance of daily weight   Is the patient able to teach back Heart Failure diet management? Yes   Is the patient able to teach back Heart Failure Zones? Yes   Is the patient able to teach back signs and symptoms of worsening condition? (i.e. weight gain, shortness of air, etc.) Yes   If the patient is a current smoker, are they able to teach back resources for cessation? Not a smoker   Is the patient/caregiver able to teach back the hierarchy of who to call/visit for symptoms/problems? PCP, Specialist, Home health nurse, Urgent Care, ED, 911 Yes   Week 4 Call Completed? Yes   Would the patient like one additional call? No   Graduated Yes   Is the patient interested in additional calls from an ambulatory ?  NOTE:  applies to high risk patients requiring additional follow-up. No   Did the patient  feel the follow up calls were helpful during their recovery period? Yes   Was the number of calls appropriate? Yes          Jo Black LPN

## 2022-01-31 NOTE — PROGRESS NOTES
[facility] Pulmonary Follow up    CHIEF COMPLAINT    Dyspnea/fatigue    HISTORY OF PRESENT ILLNESS    Kenisha Ruvalcaba is a 81 y.o.female here today for follow-up.  She was last seen in the office by Dr. Marcos Shrestha in December.  She was then admitted to [facility] on 12/23 for acute kidney injury.  She is here today for hospital follow-up.    She was originally referred to our office in October for shortness of breath.  She was found to have a moderate to large left pleural effusion and had a thoracentesis performed on 11/5 per Dr. Shrestha.  1.2 L of fluid was removed, she was noted to have a pneumothorax ex vacuo consistent with a trapped lung.  Fluid revealed a lymphocytic transudate and cytology was negative for malignancy.  She was then referred to the  pulmonary hypertension clinic and saw Dr. Resendez.  She had been placed on revatio originally but this was changed to Lasix.  She developed acute kidney injury from the Lasix and was hospitalized at [facility] on 12/23.    She has now been transitioned over to Bumex every other day and follows up with a nephrologist in 2 weeks.    She also has a follow-up with Dr. Sosa in March.    She states that she is tired all day long.  She also does have shortness of breath with any exertion.    She is currently on 4 L pulse dose and is using 4 L continuously at home.  She has not been checking her oxygenation at home.  She was originally on 2 L prior to this last hospitalization.    She is trying to work with PT at home since she has been discharged from the hospital.    She denies fever, chills, sputum production, hemoptysis, night sweats, weight loss, chest pain or palpitations.  She denies any lower extremity edema or calf tenderness.  She denies any sinus or allergy symptoms.  She denies reflux symptoms.  She states that she does have a fair appetite and is eating well.    She is up-to-date on her current vaccinations.  She is accompanied  today by her daughter.    Patient Active Problem List   Diagnosis   • Paroxysmal atrial fibrillation (HCC)   • Essential hypertension   • Coronary artery disease involving native coronary artery of native heart with angina pectoris (HCC)   • Left-sided carotid artery disease (HCC)   • Vertigo   • Anxiety   • Dyslipidemia   • Tachycardia-bradycardia syndrome (HCC)   • Primary osteoarthritis of left hip   • Status post total replacement of left hip   • Hypothyroid   • Prediabetes   • Pulmonary HTN (HCC)   • Valvular heart disease   • Mood disorder (HCC)   • Iron deficiency anemia due to chronic blood loss   • Adverse effect of iron   • Chronic diastolic (congestive) heart failure (HCC)   • Adjustment insomnia   • Chronic respiratory failure with hypoxia (HCC)       Allergies   Allergen Reactions   • Sulfa Antibiotics Rash     RASH        Current Outpatient Medications:   •  acetaminophen (TYLENOL) 325 MG tablet, Take 2 tablets by mouth Every 4 (Four) Hours As Needed for Mild Pain ., Disp: , Rfl:   •  apixaban (ELIQUIS) 2.5 MG tablet tablet, Take 1 tablet by mouth 2 (Two) Times a Day. Indications: Atrial Fibrillation, Disp: 60 tablet, Rfl: 5  •  bumetanide (BUMEX) 1 MG tablet, Take 1 tablet by mouth Every Other Day., Disp: 30 tablet, Rfl: 5  •  busPIRone (BUSPAR) 5 MG tablet, Take 3 tablets by mouth 2 (Two) Times a Day., Disp: 180 tablet, Rfl: 3  •  cholecalciferol (VITAMIN D3) 1000 UNITS tablet, Take 1,000 Units by mouth Daily., Disp: , Rfl:   •  coenzyme Q10 100 MG capsule, Take 100 mg by mouth 2 (Two) Times a Week., Disp: , Rfl:   •  fluticasone (Flonase) 50 MCG/ACT nasal spray, 2 sprays into the nostril(s) as directed by provider Daily., Disp: 15.8 mL, Rfl: 2  •  levothyroxine (SYNTHROID, LEVOTHROID) 50 MCG tablet, Take 50 mcg by mouth Every Morning., Disp: , Rfl:   •  magnesium oxide (MAG-OX) 400 MG tablet, Take 1 tablet by mouth Daily., Disp: 14 tablet, Rfl: 0  •  meclizine (ANTIVERT) 25 MG tablet, Take 25 mg by  mouth 3 (Three) Times a Day As Needed for dizziness., Disp: , Rfl:   •  metoprolol succinate XL (TOPROL-XL) 100 MG 24 hr tablet, Take 0.5 tablets by mouth Daily., Disp: 30 tablet, Rfl: 6  •  nystatin (MYCOSTATIN) 714192 UNIT/GM powder, Apply  topically to the appropriate area as directed 3 (Three) Times a Day. Use until rash has improved., Disp: 30 g, Rfl: 1  •  O2 (OXYGEN), Inhale 2 L/min Daily As Needed., Disp: , Rfl:   •  ondansetron (Zofran) 4 MG tablet, Take 1 tablet by mouth Every 8 (Eight) Hours As Needed for Nausea or Vomiting., Disp: 30 tablet, Rfl: 3  •  PARoxetine (PAXIL) 20 MG tablet, Take 1 tablet by mouth Every Morning., Disp: , Rfl:   •  potassium chloride 10 MEQ CR tablet, Take 2 tablets by mouth Daily. (Patient taking differently: Take 20 mEq by mouth Daily. Takes on days taking bumex), Disp: 90 tablet, Rfl: 3  •  rosuvastatin (CRESTOR) 20 MG tablet, Take 20 mg by mouth 3 (Three) Times a Week. MON, WEDS, FRI, Disp: , Rfl:   •  traMADol (ULTRAM) 50 MG tablet, Take 50 mg by mouth Every 6 (Six) Hours As Needed., Disp: , Rfl:   •  traZODone (DESYREL) 50 MG tablet, TAKE 1-2 TABLETS AT BEDTIME AS NEEDED INSOMNIA, Disp: , Rfl: 5  MEDICATION LIST AND ALLERGIES REVIEWED.    Social History     Tobacco Use   • Smoking status: Never Smoker   • Smokeless tobacco: Never Used   Vaping Use   • Vaping Use: Never used   Substance Use Topics   • Alcohol use: Yes     Alcohol/week: 1.0 standard drink     Types: 1 Glasses of wine per week     Comment: occas   • Drug use: No       FAMILY AND SOCIAL HISTORY REVIEWED.    Review of Systems   Constitutional: Positive for activity change and fatigue. Negative for appetite change, fever and unexpected weight change.   HENT: Negative for congestion, postnasal drip, rhinorrhea, sinus pressure, sore throat and voice change.    Eyes: Negative for visual disturbance.   Respiratory: Positive for shortness of breath. Negative for cough, chest tightness and wheezing.    Cardiovascular:  "Negative for chest pain, palpitations and leg swelling.   Gastrointestinal: Negative for abdominal distention, abdominal pain, nausea and vomiting.   Endocrine: Negative for cold intolerance and heat intolerance.   Genitourinary: Negative for difficulty urinating and urgency.   Musculoskeletal: Negative for arthralgias, back pain and neck pain.   Skin: Negative for color change and pallor.   Allergic/Immunologic: Negative for environmental allergies and food allergies.   Neurological: Positive for weakness. Negative for dizziness, syncope and light-headedness.   Hematological: Negative for adenopathy. Does not bruise/bleed easily.   Psychiatric/Behavioral: Negative for agitation and behavioral problems.   .    /60   Pulse 76   Temp 97.5 °F (36.4 °C)   Ht 160 cm (63\")   Wt 61.7 kg (136 lb)   LMP  (LMP Unknown)   SpO2 95% Comment: resting, 4liters pulse dose  BMI 24.09 kg/m²     Immunization History   Administered Date(s) Administered   • COVID-19 (PFIZER) PURPLE CAP 01/21/2021, 02/12/2021, 10/01/2021   • Flu Vaccine Quad PF >18YRS 09/21/2016, 09/30/2017   • Fluzone High Dose =>65 Years ([facility] ONLY) 09/17/2015, 09/06/2016, 09/02/2017, 09/06/2018, 09/01/2019, 10/04/2019   • Fluzone High-Dose 65+yrs 08/31/2020, 10/01/2021   • Hepatitis A 05/21/2019, 02/12/2020   • Influenza, Unspecified 09/06/2018   • Pneumococcal Conjugate 13-Valent (PCV13) 09/17/2015   • Pneumococcal Polysaccharide (PPSV23) 10/25/2011, 09/02/2017   • Shingrix 07/01/2018, 09/06/2018   • Td 10/12/2016   • Tdap 06/03/2020   • Tetanus Toxoid, Unspecified 06/03/2020   • Zostavax 01/01/2014       Physical Exam  Vitals and nursing note reviewed.   Constitutional:       Appearance: She is well-developed. She is not diaphoretic.   HENT:      Head: Normocephalic and atraumatic.   Eyes:      Pupils: Pupils are equal, round, and reactive to light.   Neck:      Thyroid: No thyromegaly.   Cardiovascular:      Rate and Rhythm: Normal rate and regular " rhythm.      Heart sounds: Normal heart sounds. No murmur heard.  No friction rub. No gallop.    Pulmonary:      Effort: Pulmonary effort is normal. No respiratory distress.      Breath sounds: Normal breath sounds. No wheezing or rales.   Chest:      Chest wall: No tenderness.   Abdominal:      General: Bowel sounds are normal.      Palpations: Abdomen is soft.      Tenderness: There is no abdominal tenderness.   Musculoskeletal:         General: No swelling. Normal range of motion.      Cervical back: Normal range of motion and neck supple.   Lymphadenopathy:      Cervical: No cervical adenopathy.   Skin:     General: Skin is warm and dry.      Capillary Refill: Capillary refill takes less than 2 seconds.   Neurological:      Mental Status: She is alert and oriented to person, place, and time.   Psychiatric:         Mood and Affect: Mood normal.         Behavior: Behavior normal.           RESULTS    Chest PA/lateral: Official report pending    PROBLEM LIST    Problem List Items Addressed This Visit        Cardiac and Vasculature    Chronic diastolic (congestive) heart failure (HCC)       Pulmonary and Pneumonias    Pulmonary HTN (HCC)    Overview     1. Echo 11-13-19: . RVSP from tricuspid regurgitation is markedly elevated (>55 mmHg). Calculated right ventricular systolic pressure from tricuspid regurgitation is 95 mmHg.         Chronic respiratory failure with hypoxia (HCC)      Other Visit Diagnoses     Pleural effusion    -  Primary    Relevant Orders    XR Chest PA & Lateral            DISCUSSION    Ms. Ruvalcaba was here for follow-up of her hospitalization at the end of December for acute kidney injury related to her CHF.  She has been switched to Bumex and is doing much better.  Her weight is down and she has no evidence of any edema currently.    We did review her chest x-ray in the office today and the official report is pending.  I will notify the patient if there are any abnormalities.    She is  currently on 4 L pulse dose I did advise her she needs to keep her oxygen level above 90% and may decrease down to 2 L pulse dose if tolerated.  I did advise her to decrease her oxygen to 3 L today at home continuously and may go down to 2 at the lowest if she is doing well by tomorrow.  I did advise her to maintain her oxygenation above 90% at all times.    I did encourage her to try to do some daily physical activity such as her foot pedal exerciser to do daily or every other day as tolerated.  I do suspect that deconditioning is playing a role in her fatigue as well.    While her mother was getting an x-ray today in the office I did have a discussion with both of her daughters in the office one was on the phone and we did discuss hospice for their mother.  They are going to discuss this later and have this addressed with the PCP in the near future.  She does have an order for palliative care consult and I did advise her that this was appropriate.    She will follow-up with Dr. Shrestha in June as previously scheduled.  I did advise her to call if she were to develop any new or worsening symptoms.    Level of service justified based on 36 minutes spent in patient care on this date of service including, but not limited to: preparing to see the patient, obtaining and/or reviewing history, performing medically appropriate examination, ordering tests/medicine/procedures, independently interpreting results, documenting clinical information in EHR, and counseling/education of patient/family/caregiver. (Level 4 30-39 minutes; Level 5 40-54 minutes)      PATSY Ramirez  01/31/202210:09 EST  Electronically signed     Please note that portions of this note were completed with a voice recognition program.        CC: Nereida Webster MD

## 2022-02-23 NOTE — TELEPHONE ENCOUNTER
"Remote monitor revealed AFIB 100% burden. I spoke to patient. She reports, \"I am nearing the end\". She reports she has a referral for hospice in place.  She reports some SOB, but verbalizes that this is a chronic symptom for her. She denies CP, dizzyness, or palpitations. No recent diagnosis of COVID. She is currently under home health care.     "

## 2022-02-23 NOTE — PROGRESS NOTES
Telehealth Visit      Patient Name: Kenisha Ruvalcaba  : 1940   MRN: [id number]     Kenisha Ruvalcaba is a 81 y.o. female who is presenting virtually to [facility] for assessment with Dr. Cielo Webster MD. The patient is seen remotely using [facility] My Chart. Patient is being seen via telehealth and stated they are in a secure environment for this session. The patient's condition being diagnosed/treated is appropriate for telemedicine. The provider identified herself as well as her credentials. The patient consents to be seen remotely.     Chief Complaint:    Chief Complaint   Patient presents with   • Congestive Heart Failure       History of Present Illness: Kenisha Ruvalcaba and daughters present to discuss end of life care. They have met with palliative and hospice. They have elected to go with hospice if they accept her. She has been having more good days while her daughter Aide is in town. They discovered she had not been wearing her oxygen in the shower which is what resulted in her being so worn out after showering. They bought a longer cannula and she has been more mobile with her oxygen. She has been playing checkers with her daughter and has been more awake and happy. There was a noticeable change in her mood when her daughter Aide left to return home. She had time last week when she did not get out of bed for several days. She has lost a significant amount of weight and is not eating much.     Subjective     I have reviewed and the following portions of the patient's history were updated as appropriate: past family history, past medical history, past social history, past surgical history and problem list.    Allergies:   Allergies   Allergen Reactions   • Sulfa Antibiotics Rash     RASH        Objective     Physical Exam:  Vital Signs: There were no vitals filed for this visit.  There is no height or weight on file to calculate BMI.     Unable to perform physical exam  due to the nature of the appointment. While appointment did utilize Zoom, video image was unavailable.     Assessment / Plan      Assessment/Plan:   Diagnoses and all orders for this visit:    1. Counseling regarding advanced care planning and goals of care (Primary)  I feel that Mrs. De is appropriate for hospice care and that her life expectancy is less than 6 months due to chronic hypoxic respiratory failure due to diastolic heart failure resulting in pHTN. While she has had good days that are usually associated with visiting family, she has little appetite, weakness, severe nausea, fatigue, and weight loss.      2. Chronic Respiratory failure with hypoxia   Chronic, stable. 2/2 chronic HFpEF resulting in pHTN. Pulse oximeter at home is less than 88% when not wearing oxygen. Longer oxygen cannula has made her more mobile around her home.     3. Chronic Diastolic Heart Failure   Chronic, stable. Continue Bumex 1 mg every other day.     4. Pulmonary HTN   Treatment as above with HFpEF and respiratory failure.     Follow Up:   Return for Next scheduled follow up.    I spent approximately 20 minutes providing clinical care for this patient; including review of patient's chart and provider documentation, discussing the patients care including taking history and formulating a treatment plan, placing orders, and completing patient documentation. Zoom without video was used to complete this appointment. The patient's identity was confirmed by using two demographic identifiers, full name and date of birth.  The patient confirmed their current location in [place], and this provider was located in [place], [place].     Nereida Webster MD  [facility]

## 2022-04-04 NOTE — TELEPHONE ENCOUNTER
Attempted to call Ms. De without answer. Left voicemail. Called her daughter, [place]. She reports that Ms. De is doing ok on home hospice. She stopped taking Buspirone which resolved nausea. She is now taking Bumex 1 mg daily. She spends most of the day in bed but gets up to go to the bathroom and to eat lunch and dinner. Overall, symptoms well controlled.     Cielo Webster MD

## 2022-05-07 NOTE — PROGRESS NOTES
Kenisha Ruvalcaba  1940  [phone]    09/25/2019    [facility] CARDIOLOGY     Lana Flores MD  [address] [facility] [address] [place] [address]    Chief Complaint   Patient presents with   • Atrial Fibrillation       Problem List:   1. Paroxysmal atrial fibrillation:  a. Echocardiogram, 09/10/2010: Left atrium 4.6. Moderate left atrial enlargement. Ejection fraction 55%. Severe right atrial enlargement and 2+ tricuspid regurgitation with RVSP 37 mmHg.  b. Coumadin initiation, 09/07/2010.  c. Initiation of Tambocor and subsequent external cardioversion, 12/03/2010.  d. Echocardiogram, 12/03/2010: Moderate mitral regurgitation, moderate tricuspid regurgitation, normal left ventricular size and function, negative bubble study.   e. External cardioversion to sinus rhythm, December 2010.  f. CHADS-VASc score equals 4 to 6, on Eliquis  g. Echocardiogram 10/2016: EF >70%, grade II diastolic dysfunction, LA cavity mildly dilated, mild MR, RVSP 45-55 mmHg  h. Recurrent atrial fibrillation despite Flecainide, flecainide discontinued 09/2018  i. Tikosyn initiation 09/2018, Metoprolol increased to 100mg BID 1/19  j.  S/p successful RFA of the pulmonary veins, atrial tachycardia, right atrial isthmus dependent flutter, and RFA of focal AF and long highly fractionated LA signals, 4/8/19  2. Sick sinus syndrome:   a. Patient was hospitalized at [facility], 04/26/2016 through 04/28/2016 with a 30-day Event monitor.   b. Event recorder 12/16/16: 9% AF, 3.3 sec pauses, HR range from 37 bpm - 151 bpm  c. PM implant 2/17/17- BTK   3. Mild coronary artery disease on cardiac catheterization, 02/06/2013. And LHC on 6/6/16, normal EF  4. Carotid artery stenosis, status post left CEA:  a. Carotid ultrasound, March 2013, with no significant stenosis.  5. Valvular heart disease:  a. Echo, 01/19/2013: Left ventricular ejection fraction 55% to 60%, mild to moderate mitral regurgitation,  RVSP 46 mmHg.   6. Multiple falls  7. Hypertension.   8. Dyslipidemia.   9. Vertigo.   10. Anxiety.   11. Surgical history:  a. Tubal ligation.   b. Left CEA, March 2008.  c. Left Hip replacement     Allergies  Allergies   Allergen Reactions   • Beta Adrenergic Blockers Dizziness     DIZZINESS    • Sulfa Antibiotics Rash     RASH        Current Medications    Current Outpatient Medications:   •  acetaminophen (TYLENOL) 325 MG tablet, Take 2 tablets by mouth Every 4 (Four) Hours As Needed for Mild Pain ., Disp: , Rfl:   •  amLODIPine (NORVASC) 5 MG tablet, Take 5 mg by mouth Every Morning., Disp: , Rfl:   •  aspirin 81 MG EC tablet, Take 81 mg by mouth Every Night., Disp: , Rfl:   •  cholecalciferol (VITAMIN D3) 1000 UNITS tablet, Take 1,000 Units by mouth Daily., Disp: , Rfl:   •  coenzyme Q10 100 MG capsule, Take 100 mg by mouth Daily., Disp: , Rfl:   •  ELIQUIS 5 MG tablet tablet, TAKE 1 BY MOUTH TWICE A DAY, Disp: 60 tablet, Rfl: 6  •  furosemide (LASIX) 20 MG tablet, Take 1 tablet by mouth Daily. Take as needed for edema. (Patient taking differently: Take 20 mg by mouth Daily As Needed. Take as needed for edema.), Disp: 90 tablet, Rfl: 3  •  levothyroxine (SYNTHROID, LEVOTHROID) 25 MCG tablet, Take 25 mcg by mouth Every Morning., Disp: , Rfl:   •  losartan (COZAAR) 100 MG tablet, Take 1 tablet by mouth Daily. (Patient taking differently: Take 100 mg by mouth Every Morning.), Disp: 30 tablet, Rfl: 6  •  meclizine (ANTIVERT) 25 MG tablet, Take 25 mg by mouth 3 (Three) Times a Day As Needed for dizziness., Disp: , Rfl:   •  metoprolol tartrate (LOPRESSOR) 100 MG tablet, Take 1 tablet by mouth 2 (Two) Times a Day., Disp: 60 tablet, Rfl: 11  •  nitroglycerin (NITROSTAT) 0.4 MG SL tablet, Place 0.4 mg under the tongue Every 5 (Five) Minutes As Needed for chest pain. Take no more than 3 doses in 15 minutes., Disp: , Rfl:   •  PARoxetine (PAXIL) 20 MG tablet, Take 10 mg by mouth Every Night., Disp: , Rfl:   •  potassium  "chloride (K-DUR) 10 MEQ CR tablet, Take 1 tablet by mouth Daily. Take with lasix, Disp: 30 tablet, Rfl: 11  •  rosuvastatin (CRESTOR) 20 MG tablet, Take 20 mg by mouth 2 (Two) Times a Week. Wed and Sat, Disp: , Rfl:   •  traMADol (ULTRAM) 50 MG tablet, Take 50 mg by mouth 3 (Three) Times a Day As Needed., Disp: , Rfl: 2  •  traZODone (DESYREL) 50 MG tablet, TAKE 1-2 TABLETS AT BEDTIME AS NEEDED INSOMNIA, Disp: , Rfl: 5    History of Present Illness     Pt presents for follow up of atrial fibrillation, SSS with PM check, HTN. Since we last saw the pt, pt denies any AF episodes. She had a traumatic fall about 4 weeks ago and suffered a broken nose, hematomas to her face, and a broken shoulder. Since then, she has felt anxious. She has some chest pains since the fall while watching TV and feels pressure, lasting about a few seconds. Not worsened by exertion. She denies SOB, LH, and dizziness. No TIA/CVA symptoms. She asks today about Watchman Device. She overall feels tired. Her BP is about is 92/80 on average. She has lost about 30 lbs since she had hip replacement and has been more mobile.     ROS:  General:  + fatigue, - weight gain + loss (30 lbs since February)  Cardiovascular:  + CP, - PND, syncope, near syncope, edema or palpitations.  Pulmonary:  Denies GARVEY, cough, or wheezing      Vitals:    09/25/19 0923   BP: 112/60   BP Location: Right arm   Patient Position: Sitting   Pulse: 80   SpO2: 92%   Weight: 70.8 kg (156 lb)   Height: 157.5 cm (62\")     Body mass index is 28.53 kg/m².  PE:  General: NAD. A & O x 3.   Neck: no JVD, no carotid bruits, no TM  Heart RRR, NL S1, S2, S4 present, no rubs, murmurs  Lungs: CTA, no wheezes, rhonchi, or rales  Abd: soft, non-tender, NL BS  Ext: No musculoskeletal deformities, no edema, cyanosis, or clubbing  Psych: normal mood and affect    Diagnostic Data:    PM Manual Interrogation: normal function. 96% A paced. 1% RV paced. 6 years 8 months on battery. Less than 1% AFIB. "     Procedures    1. Paroxysmal atrial fibrillation (CMS/HCC)    2. SSS (sick sinus syndrome) (CMS/[facility])    3. Essential hypertension          Plan:  1) Paroxysmal atrial fibrillation:  - S/p PVA, 04/2019.  Overall maintaining NSR off antiarrhythmics  - Continue present medications.   2) Anticoagulation:  - CHADSVASc = 4, on Eliquis. Consider Watchman device due to frequent falls and recurrent bleeding.   3) SSS:  - S/p PPM implant, device with normal function  4) HTN:  - has lost weight and now BP is too low. Will d/c Norvasc today.   - Wt loss, exercise, salt reduction    F/up in 6 months    Scribed for Darryl Swenson MD by aCrie Marie PA-C. 9/25/2019  9:33 AM     I, Darryl Swenson MD, personally performed the services described in this documentation as scribed by the above named individual in my presence, and it is both accurate and complete.  9/25/2019  9:51 AM         Home

## 2022-05-21 NOTE — TELEPHONE ENCOUNTER
Dr. Vale,  Are you ok with this patient removing bandages before leaving [facility], do you have any other requirements for her before coming to see us in office on 2/19?   Yes

## 2022-10-11 NOTE — TELEPHONE ENCOUNTER
Called and spoke with care giver regarding the biot[employer] monitor not transmitting. She stated the monitor is under the bed so I ask her to place it on a table beside her bed. She was agreeable and I will see if it connects tomorrow.

## 2023-03-22 NOTE — H&P
Cardiology     Kenishamendel Ruvalcaba  1940  [phone]      02/17/17      [facility] CVOU    Lana Flores MD  [address] [address] / [facility] [id number]    CC: tachybrady syndrome    Problem List:   1. Paroxysmal atrial fibrillation:  a. Echocardiogram, 09/10/2010: Left atrium 4.6. Moderate left atrial enlargement. Ejection fraction 55%. Severe right atrial enlargement and 2+ tricuspid regurgitation with RVSP 37 mmHg.  b. Coumadin initiation, 09/07/2010.  c. Initiation of Tambocor and subsequent external cardioversion, 12/03/2010.  d. Echocardiogram, 12/03/2010: Moderate mitral regurgitation, moderate tricuspid regurgitation, normal left ventricular size and function, negative bubble study.   e. External cardioversion to sinus rhythm, December 2010.  f. CHADS-VASc score equals 4 to 6, on Coumadin.   h. Echo: 10/2016 Left ventricular function is hyperdynamic (EF > 70).  Left ventricular diastolic dysfunction (grade II) consistent with pseudonormalization. Left atrial cavity size is mildly dilated.  Mild mitral valve regurgitation is present. Estimated right ventricular systolic pressure from tricuspid regurgitation is moderately elevated (45-55        mmHg).  2. Sick sinus syndrome:   a. Patient was hospitalized at [facility], 04/26/2016 through 04/28/2016 with a 30-day Event monitor.   b. Event recorder 12/16/16: 9% AF, symptomatic 3.3 sec pauses, HR range from 37 bpm - 151 bpm  3. Mild coronary artery disease on cardiac catheterization, 02/06/2013. And [facility] on 6/6/16, normal EF  4. Carotid artery stenosis, status post left CEA:  a. Carotid ultrasound, March 2013, with no significant stenosis.  5. Valvular heart disease:  a. Echo, 01/19/2013: Left ventricular ejection fraction 55% to 60%, mild to moderate mitral regurgitation, RVSP 46 mmHg.   6. Sick sinus syndrome:  a. Admitted to [facility] on 04/26/2016.  7. Hypertension.   8. Dyslipidemia.   9. Vertigo.    10. Anxiety.   11. Surgical history:  a. Tubal ligation.   b. Left CEA, March 2008.      History of Present Illness:   Ms Ruvalcaba is a 76 year old WF with history of PAF on Flecainide for many years with recent development of tachybrady syndrome. She has had multiple atrial fibrillation episodes mainly minutes to hours in duration. Iniatially flecainide was increased but then decreased back to 100 mg po bid after ER recorded demonstrated slow HR and pauses. Pt complains of SOB mainly with exertion, No CP. She also has sinking spells with decreased responsiveness and slow heart rate in the 30's documented with pauses by ER.   Overall feels tired. Had HTN response with BP in the 200's recently.    Allergies   Allergen Reactions   • Beta Adrenergic Blockers Dizziness   • Sulfa Antibiotics Rash       Prior to Admission Medications     Prescriptions Last Dose Informant Patient Reported? Taking?    amLODIPine (NORVASC) 2.5 MG tablet  Medication Bottle Yes No    Take 2.5 mg by mouth Daily.    apixaban (ELIQUIS) 5 MG tablet tablet  Medication Bottle Yes No    Take 5 mg by mouth 2 (Two) Times a Day.    aspirin 81 MG EC tablet  Medication Bottle Yes No    Take 81 mg by mouth Daily.    cholecalciferol (VITAMIN D3) 1000 UNITS tablet  Self Yes No    Take 1,000 Units by mouth Daily.    coenzyme Q10 100 MG capsule  Self Yes No    Take 100 mg by mouth Daily.    flecainide (TAMBOCOR) 100 MG tablet  Medication Bottle Yes No    Take 100 mg by mouth 2 (Two) Times a Day.    isosorbide mononitrate (IMDUR) 30 MG 24 hr tablet  Medication Bottle Yes No    Take 30 mg by mouth Daily.    levothyroxine (SYNTHROID, LEVOTHROID) 25 MCG tablet  Medication Bottle Yes No    Take 25 mcg by mouth Daily.    meclizine (ANTIVERT) 25 MG tablet  Medication Bottle Yes No    Take 25 mg by mouth 3 (Three) Times a Day As Needed for dizziness.    nitroglycerin (NITROSTAT) 0.4 MG SL tablet  Medication Bottle Yes No    Place 0.4 mg under the tongue Every 5  (Five) Minutes As Needed for chest pain. Take no more than 3 doses in 15 minutes.    PARoxetine (PAXIL) 10 MG tablet  Medication Bottle Yes No    Take 10 mg by mouth Every Morning.    rosuvastatin (CRESTOR) 10 MG tablet  Medication Bottle Yes No    Take 10 mg by mouth Daily.    valsartan-hydrochlorothiazide (DIOVAN-HCT) 160-25 MG per tablet  Medication Bottle Yes No    Take 1 tablet by mouth Daily.            Current Facility-Administered Medications:   •  acetaminophen (TYLENOL) tablet 650 mg, 650 mg, Oral, Q4H PRN, JOSI Collado  •  ceFAZolin in dextrose (ANCEF) IVPB solution 2 g, 2 g, Intravenous, Once, JOSI Collado  •  ondansetron (ZOFRAN) injection 4 mg, 4 mg, Intravenous, Q6H PRN, JOSI Collado    Social History     Social History   • Marital status:      Spouse name: N/A   • Number of children: N/A   • Years of education: N/A     Occupational History   • retired      Social History Main Topics   • Smoking status: Never Smoker   • Smokeless tobacco: Never Used   • Alcohol use 0.6 oz/week     1 Glasses of wine per week      Comment: ocassionally A GLASS OF WINE   • Drug use: No   • Sexual activity: Defer     Other Topics Concern   • Not on file     Social History Narrative    Denies caffeine use. Lives at home with .        Family History   Problem Relation Age of Onset   • Alzheimer's disease Mother    • Cancer Mother    • Aortic aneurysm Father    • Heart valve disorder Sister        REVIEW OF SYSTEMS:   CONST:  No weight loss, fever, chills, weakness or fatigue.   HEENT:  No visual loss, blurred vision, double vision, yellow sclerae.                   No hearing loss, congestion, sore throat.   SKIN:      No rashes, urticaria, ulcers, sores.     RESP:     No shortness of breath, hemoptysis, cough, sputum.   GI:           No anorexia, nausea, vomiting, diarrhea. No abdominal pain, melena.   :         No burning on urination, hematuria or increased frequency.  ENDO:    No  "diaphoresis, cold or heat intolerance. No polyuria or polydipsia.   NEURO:  No headache, dizziness, syncope, paralysis, ataxia, or parasthesias.                  No change in bowel or bladder control. No history of CVA/TIA  MUSC:    No muscle, back pain, joint pain or stiffness.   HEME:    No anemia, bleeding, bruising. No history of DVT/PE.  PSYCH:  No history of depression, anxiety    Vitals:    02/17/17 0743 02/17/17 0746   BP:  169/63   BP Location:  Right arm   Patient Position:  Lying   Pulse:  68   Temp:  97.6 °F (36.4 °C)   TempSrc:  Temporal Artery    SpO2:  95%   Weight: 181 lb 7 oz (82.3 kg)    Height: 63\" (160 cm)        Physical Exam:  GEN: Well nourished, Well- developed  No acute distress  HEENT: Normocephalic, Atraumatic, PERRLA, moist mucous membranes  NECK: supple, NO JVD, no thyromegaly, no lymphadenopathy  CARD: S1S2  RRR no murmur, gallop, rub  LUNGS: Clear to auscultataion, normal respiratory effort  ABDOMEN: Soft, nontender, normal bowel sounds  EXTREMITIES:No gross deformities,  No clubbing, cyanosis, or edema  SKIN: Warm, dry  NEURO: No focal deficits  PSYCHIATRIC: Normal affect and mood      Data:     Results from last 7 days  Lab Units 02/16/17  1009   WBC 10*3/mm3 6.16   HEMOGLOBIN g/dL 12.6   HEMATOCRIT % 40.0   PLATELETS 10*3/mm3 225       Results from last 7 days  Lab Units 02/16/17  1009   SODIUM mmol/L 140   POTASSIUM mmol/L 4.1   CHLORIDE mmol/L 102   TOTAL CO2 mmol/L 32.0*   BUN mg/dL 17   CREATININE mg/dL 0.90   GLUCOSE mg/dL 95        Results from last 7 days  Lab Units 02/16/17  1015   HEMOGLOBIN A1C % 5.70*                   Results from last 7 days  Lab Units 02/16/17  1009   PROTIME Seconds 10.8   INR  0.99           Tele: NSR      Assessment and Plan:   1. Tachycardia-bradycardia syndrome    2. SSS (sick sinus syndrome)      1. Tachy-rosalba syndrome with syncope and documented bradycardia/pauses in ER. On flecanide, which has been decreased, causing more breakthrough atrial " fibrillation. The patient will undergo DDD PM today with Dr. Swenson and then titration/alternate AA RX. The risks, benefits, and alternatives of the procedure have been reviewed and the patient wishes to proceed.   CHADSvasc = 4 on Eliquis  2. HTN- monitor,     Carie Marie PA-C  [facility]  2/17/2017   7:50 AM        I, Darryl Swenson MD, personally performed the services described as documented by the above named individual. I have made any necessary edits and it is both accurate and complete 2/17/2017  9:03 AM               coccyx

## (undated) DEVICE — SKIN AFFIX SURG ADHESIVE 72/CS 0.55ML: Brand: MEDLINE

## (undated) DEVICE — ST EXT IV LG BORE NDLESS FLTR LL 17IN

## (undated) DEVICE — LIMB HOLDERS: Brand: DEROYAL

## (undated) DEVICE — PK HIP TOTL UNIV 10

## (undated) DEVICE — ADULT, W/LG. BACK PAD, RADIOTRANSPARENT ELEMENT AND LEAD WIRE: Brand: DEFIBRILLATION ELECTRODES

## (undated) DEVICE — 3M™ STERI-STRIP™ REINFORCED ADHESIVE SKIN CLOSURES, R1547, 1/2 IN X 4 IN (12 MM X 100 MM), 6 STRIPS/ENVELOPE: Brand: 3M™ STERI-STRIP™

## (undated) DEVICE — SYS TRNSEP ACC BRK ACROSS A/ 71CM

## (undated) DEVICE — GLV SURG TRIUMPH ORTHO W/ALOE PF LTX 8 STRL

## (undated) DEVICE — DRSNG SURESITE123 4X4.8IN

## (undated) DEVICE — SST TWIST DRILL, STANDARD, 2.4MM DIA. X 127MM: Brand: MICROAIRE®

## (undated) DEVICE — PINNACLE INTRODUCER SHEATH: Brand: PINNACLE

## (undated) DEVICE — ANTIBACTERIAL UNDYED BRAIDED (POLYGLACTIN 910), SYNTHETIC ABSORBABLE SUTURE: Brand: COATED VICRYL

## (undated) DEVICE — MODEL AT P65, P/N 701554-001KIT CONTENTS: HAND CONTROLLER, 3-WAY HIGH-PRESSURE STOPCOCK WITH ROTATING END AND PREMIUM HIGH-PRESSURE TUBING: Brand: ANGIOTOUCH® KIT

## (undated) DEVICE — TB SXN FRAZIER 12F STRL

## (undated) DEVICE — CVR HNDL LT SURG ACCSSRY BLU STRL

## (undated) DEVICE — DRAPE,REIN 53X77,STERILE: Brand: MEDLINE

## (undated) DEVICE — GW DIAG .032

## (undated) DEVICE — ST EXT IV SMARTSITE 2VLV SP M LL 5ML IV1

## (undated) DEVICE — GLV SURG SIGNATURE TOUCH PF LTX 8 STRL BX/50

## (undated) DEVICE — DECANT BG O JET

## (undated) DEVICE — INTRO SHEATH FAST/CATH STD 8.5F .038IN 12CM

## (undated) DEVICE — SPNG GZ WOVN 4X4IN 12PLY 10/BX STRL

## (undated) DEVICE — CAUTERY TIP POLISHER: Brand: DEVON

## (undated) DEVICE — ENCORE® LATEX MICRO SIZE 8.5, STERILE LATEX POWDER-FREE SURGICAL GLOVE: Brand: ENCORE

## (undated) DEVICE — KT MANIFLD EP

## (undated) DEVICE — ST INF PRI SMRTSTE 20DRP 2VLV 24ML 117

## (undated) DEVICE — DECANTER: Brand: UNBRANDED

## (undated) DEVICE — SWAN-GANZ THERMODILUTION CATHETER: Brand: SWAN-GANZ

## (undated) DEVICE — MODEL BT2000 P/N 700287-012KIT CONTENTS: MANIFOLD WITH SALINE AND CONTRAST PORTS, SALINE TUBING WITH SPIKE AND HAND SYRINGE, TRANSDUCER: Brand: BT2000 AUTOMATED MANIFOLD KIT

## (undated) DEVICE — COATED BRAIDED POLYESTER: Brand: TI-CRON

## (undated) DEVICE — INTRO SHEATH ENGAGE W/50 GW .038 9F12

## (undated) DEVICE — INTRO TEAR AWAY/LVD W/SD PRT 6F 13CM

## (undated) DEVICE — SUT VIC 1 CTX 36IN OBGYN VCP977H

## (undated) DEVICE — IRRIGATOR BULB ASEPTO 60CC STRL

## (undated) DEVICE — MEDI-VAC YANKAUER SUCTION HANDLE W/BULBOUS TIP: Brand: CARDINAL HEALTH

## (undated) DEVICE — Device: Brand: THERMOCOOL SMARTTOUCH SF

## (undated) DEVICE — PENCL E/S HNDSWCH ROCKRBTN HOLSTR 10FT

## (undated) DEVICE — PK CATH CARD 10

## (undated) DEVICE — HDRST POSTIN FM CRDL TRACH SLOT 9X8X4IN

## (undated) DEVICE — CATH SHEATH GUIDE SWARTZ 6FR

## (undated) DEVICE — NDL SPINE 22G 31/2IN BLK

## (undated) DEVICE — SYR LUERLOK 30CC

## (undated) DEVICE — Device: Brand: SOUNDSTAR

## (undated) DEVICE — STERILE (15.2 TAPERED TO 7.6 X 183CM) POLYETHYLENE ACCORDION-FOLDED COVER FOR USE WITH SIEMENS ACUNAV ULTRASOUND CATHETER FAMILY CONNECTOR: Brand: SWIFTLINK TRANSDUCER COVER

## (undated) DEVICE — INTRO SHEATH 8F60CM

## (undated) DEVICE — LEX ELECTRO PHYSIOLOGY: Brand: MEDLINE INDUSTRIES, INC.

## (undated) DEVICE — SOL NACL 0.9PCT 1000ML

## (undated) DEVICE — CANN NASL CO2 DIVIDED A/

## (undated) DEVICE — STERILE PVP: Brand: MEDLINE INDUSTRIES, INC.

## (undated) DEVICE — Device: Brand: WEBSTER

## (undated) DEVICE — Device: Brand: LASSO NAV

## (undated) DEVICE — Device: Brand: SMARTABLATE

## (undated) DEVICE — SUT MONOCRYL PLS ANTIB UND 3/0  PS1 27IN

## (undated) DEVICE — INTRO SHEATH FAST/CATH LG/LUM 11F .038IN 12CM

## (undated) DEVICE — STRYKER PERFORMANCE SERIES SAGITTAL BLADE: Brand: STRYKER PERFORMANCE SERIES

## (undated) DEVICE — DRSNG SURG AQUACEL AG 9X25CM

## (undated) DEVICE — INTRO SHEATH ENGAGE W/50 GW .038 7F12

## (undated) DEVICE — INTRO SHEATH FASTCATH SAFL .038IN 8.5F 60CM

## (undated) DEVICE — TUBING, SUCTION, 1/4" X 10', STRAIGHT: Brand: MEDLINE

## (undated) DEVICE — INTRO SWARTZ TRNSEP LAMP90 8.5F 63CM

## (undated) DEVICE — SET PRIMARY GRVTY 10DP MALE LL 104IN

## (undated) DEVICE — PRESSURE MONITORING SET: Brand: TRUWAVE

## (undated) DEVICE — Device: Brand: REFERENCE PATCH CARTO 3

## (undated) DEVICE — DOME MONITORING W BONDED STPCK BIOTRANS2

## (undated) DEVICE — Device: Brand: MEDEX

## (undated) DEVICE — CONTRL CONTRST CHMBRD W/TBG72IN REUS

## (undated) DEVICE — HEWSON SUTURE RETRIEVER: Brand: HEWSON SUTURE RETRIEVER